# Patient Record
Sex: FEMALE | Race: WHITE | Employment: OTHER | URBAN - METROPOLITAN AREA
[De-identification: names, ages, dates, MRNs, and addresses within clinical notes are randomized per-mention and may not be internally consistent; named-entity substitution may affect disease eponyms.]

---

## 2017-03-07 RX ORDER — PANTOPRAZOLE SODIUM 40 MG/1
TABLET, DELAYED RELEASE ORAL
Qty: 90 TABLET | Refills: 1 | Status: SHIPPED | OUTPATIENT
Start: 2017-03-07 | End: 2017-07-12 | Stop reason: SDUPTHER

## 2017-03-07 RX ORDER — ATORVASTATIN CALCIUM 40 MG/1
TABLET, FILM COATED ORAL
Qty: 90 TABLET | Refills: 1 | Status: SHIPPED | OUTPATIENT
Start: 2017-03-07 | End: 2017-07-12 | Stop reason: SDUPTHER

## 2017-03-07 RX ORDER — LOSARTAN POTASSIUM AND HYDROCHLOROTHIAZIDE 12.5; 5 MG/1; MG/1
TABLET ORAL
Qty: 90 TABLET | Refills: 1 | Status: SHIPPED | OUTPATIENT
Start: 2017-03-07 | End: 2017-04-12 | Stop reason: CLARIF

## 2017-03-13 ENCOUNTER — TELEPHONE (OUTPATIENT)
Dept: FAMILY MEDICINE CLINIC | Age: 72
End: 2017-03-13

## 2017-03-13 LAB — VITAMIN D 25-HYDROXY: 27 NG/ML

## 2017-03-15 ENCOUNTER — OFFICE VISIT (OUTPATIENT)
Dept: FAMILY MEDICINE CLINIC | Age: 72
End: 2017-03-15

## 2017-03-15 VITALS
WEIGHT: 137.25 LBS | BODY MASS INDEX: 25.91 KG/M2 | HEART RATE: 72 BPM | DIASTOLIC BLOOD PRESSURE: 62 MMHG | RESPIRATION RATE: 14 BRPM | SYSTOLIC BLOOD PRESSURE: 98 MMHG | HEIGHT: 61 IN

## 2017-03-15 DIAGNOSIS — I95.2 HYPOTENSION DUE TO DRUGS: ICD-10-CM

## 2017-03-15 DIAGNOSIS — I10 ESSENTIAL HYPERTENSION: ICD-10-CM

## 2017-03-15 DIAGNOSIS — D64.9 ANEMIA, UNSPECIFIED TYPE: ICD-10-CM

## 2017-03-15 DIAGNOSIS — E78.5 HYPERLIPIDEMIA, UNSPECIFIED HYPERLIPIDEMIA TYPE: ICD-10-CM

## 2017-03-15 DIAGNOSIS — M81.0 OSTEOPOROSIS: ICD-10-CM

## 2017-03-15 PROCEDURE — 99214 OFFICE O/P EST MOD 30 MIN: CPT | Performed by: FAMILY MEDICINE

## 2017-03-15 RX ORDER — LOSARTAN POTASSIUM 25 MG/1
25 TABLET ORAL DAILY
Qty: 30 TABLET | Refills: 3 | Status: SHIPPED | OUTPATIENT
Start: 2017-03-15 | End: 2017-07-12 | Stop reason: SDUPTHER

## 2017-03-15 ASSESSMENT — ENCOUNTER SYMPTOMS
CONSTIPATION: 0
SHORTNESS OF BREATH: 0
COUGH: 0
VOMITING: 0
EYES NEGATIVE: 1
ABDOMINAL PAIN: 0
DIARRHEA: 0
NAUSEA: 0
CHEST TIGHTNESS: 0

## 2017-03-31 ENCOUNTER — NURSE ONLY (OUTPATIENT)
Dept: FAMILY MEDICINE CLINIC | Age: 72
End: 2017-03-31

## 2017-03-31 VITALS — DIASTOLIC BLOOD PRESSURE: 74 MMHG | SYSTOLIC BLOOD PRESSURE: 106 MMHG

## 2017-03-31 DIAGNOSIS — Z01.30 BP CHECK: Primary | ICD-10-CM

## 2017-04-05 ENCOUNTER — TELEPHONE (OUTPATIENT)
Dept: FAMILY MEDICINE CLINIC | Age: 72
End: 2017-04-05

## 2017-04-07 LAB
ABSOLUTE BASO #: 0 K/UL (ref 0–0.1)
ABSOLUTE EOS #: 0.1 K/UL (ref 0.1–0.4)
ABSOLUTE LYMPH #: 1.8 K/UL (ref 0.8–5.2)
ABSOLUTE MONO #: 0.3 K/UL (ref 0.1–0.9)
ABSOLUTE NEUT #: 1.9 K/UL (ref 1.3–9.1)
ALBUMIN SERPL-MCNC: 4.4 G/DL (ref 3.2–5.3)
ALK PHOSPHATASE: 72 IU/L (ref 35–121)
ALT SERPL-CCNC: 12 IU/L (ref 5–59)
ANION GAP SERPL CALCULATED.3IONS-SCNC: 11 MMOL/L
AST SERPL-CCNC: 14 IU/L (ref 10–42)
BASOPHILS RELATIVE PERCENT: 1 %
BILIRUB SERPL-MCNC: 0.6 MG/DL (ref 0.2–1.3)
BUN BLDV-MCNC: 14 MG/DL (ref 9–24)
CALCIUM SERPL-MCNC: 9.9 MG/DL (ref 8.7–10.8)
CHLORIDE BLD-SCNC: 105 MMOL/L (ref 95–111)
CO2: 27 MMOL/L (ref 21–32)
CREAT SERPL-MCNC: 1 MG/DL (ref 0.5–1.3)
EGFR AFRICAN AMERICAN: 66
EGFR IF NONAFRICAN AMERICAN: 54
EOSINOPHILS RELATIVE PERCENT: 2.9 %
GLUCOSE: 104 MG/DL (ref 70–100)
HCT VFR BLD CALC: 34.6 % (ref 36–48)
HEMOGLOBIN: 11.3 G/DL (ref 12–16)
LYMPHOCYTE %: 42.8 %
MCH RBC QN AUTO: 28.5 PG (ref 27–34)
MCHC RBC AUTO-ENTMCNC: 32.7 G/DL (ref 31–36)
MCV RBC AUTO: 87.4 FL (ref 80–100)
MONOCYTES # BLD: 7.5 %
NEUTROPHILS RELATIVE PERCENT: 45.6 %
PDW BLD-RTO: 14.1 % (ref 10.8–14.8)
PLATELETS: 345 K/UL (ref 150–450)
POTASSIUM SERPL-SCNC: 3.8 MMOL/L (ref 3.5–5.4)
RBC: 3.96 M/UL (ref 4–5.5)
SODIUM BLD-SCNC: 139 MMOL/L (ref 134–147)
TOTAL PROTEIN: 6.6 G/DL (ref 5.8–8)
WBC: 4.1 K/UL (ref 3.7–10.8)

## 2017-04-12 ENCOUNTER — OFFICE VISIT (OUTPATIENT)
Dept: FAMILY MEDICINE CLINIC | Age: 72
End: 2017-04-12

## 2017-04-12 ENCOUNTER — TELEPHONE (OUTPATIENT)
Dept: FAMILY MEDICINE CLINIC | Age: 72
End: 2017-04-12

## 2017-04-12 VITALS
BODY MASS INDEX: 25.07 KG/M2 | WEIGHT: 132.8 LBS | SYSTOLIC BLOOD PRESSURE: 124 MMHG | HEART RATE: 62 BPM | DIASTOLIC BLOOD PRESSURE: 68 MMHG | RESPIRATION RATE: 14 BRPM | HEIGHT: 61 IN

## 2017-04-12 DIAGNOSIS — M81.0 OSTEOPOROSIS: Primary | ICD-10-CM

## 2017-04-12 DIAGNOSIS — E55.9 VITAMIN D DEFICIENCY: ICD-10-CM

## 2017-04-12 DIAGNOSIS — D64.9 ANEMIA, UNSPECIFIED TYPE: ICD-10-CM

## 2017-04-12 DIAGNOSIS — I10 ESSENTIAL HYPERTENSION: ICD-10-CM

## 2017-04-12 PROCEDURE — 99213 OFFICE O/P EST LOW 20 MIN: CPT | Performed by: FAMILY MEDICINE

## 2017-04-12 RX ORDER — ALENDRONATE SODIUM 70 MG/1
70 TABLET ORAL
Qty: 4 TABLET | Refills: 3 | Status: SHIPPED | OUTPATIENT
Start: 2017-04-12 | End: 2017-07-12 | Stop reason: SDUPTHER

## 2017-04-12 RX ORDER — GABAPENTIN 300 MG/1
300 CAPSULE ORAL 3 TIMES DAILY
COMMUNITY
End: 2018-07-16 | Stop reason: ALTCHOICE

## 2017-04-12 ASSESSMENT — PATIENT HEALTH QUESTIONNAIRE - PHQ9
2. FEELING DOWN, DEPRESSED OR HOPELESS: 0
1. LITTLE INTEREST OR PLEASURE IN DOING THINGS: 0
SUM OF ALL RESPONSES TO PHQ9 QUESTIONS 1 & 2: 0
SUM OF ALL RESPONSES TO PHQ QUESTIONS 1-9: 0

## 2017-04-12 ASSESSMENT — ENCOUNTER SYMPTOMS
COUGH: 0
CONSTIPATION: 0
CHEST TIGHTNESS: 0
DIARRHEA: 0
ABDOMINAL PAIN: 0
VOMITING: 0
EYES NEGATIVE: 1
NAUSEA: 0
SHORTNESS OF BREATH: 0

## 2017-07-07 LAB
ABSOLUTE BASO #: 0.1 K/UL (ref 0–0.1)
ABSOLUTE EOS #: 0.2 K/UL (ref 0.1–0.4)
ABSOLUTE LYMPH #: 2 K/UL (ref 0.8–5.2)
ABSOLUTE MONO #: 0.4 K/UL (ref 0.1–0.9)
ABSOLUTE NEUT #: 2.8 K/UL (ref 1.3–9.1)
BASOPHILS RELATIVE PERCENT: 0.9 %
EOSINOPHILS RELATIVE PERCENT: 3.4 %
HCT VFR BLD CALC: 35.3 % (ref 36–48)
HEMOGLOBIN: 11.5 G/DL (ref 12–16)
LYMPHOCYTE %: 36.8 %
MCH RBC QN AUTO: 29.2 PG (ref 27–34)
MCHC RBC AUTO-ENTMCNC: 32.6 G/DL (ref 31–36)
MCV RBC AUTO: 89.6 FL (ref 80–100)
MONOCYTES # BLD: 7.3 %
NEUTROPHILS RELATIVE PERCENT: 51.4 %
PDW BLD-RTO: 12.9 % (ref 10.8–14.8)
PLATELETS: 356 K/UL (ref 150–450)
RBC: 3.94 M/UL (ref 4–5.5)
WBC: 5.4 K/UL (ref 3.7–10.8)

## 2017-07-08 LAB — VITAMIN D 25-HYDROXY: 28 NG/ML

## 2017-07-11 ENCOUNTER — TELEPHONE (OUTPATIENT)
Dept: FAMILY MEDICINE CLINIC | Age: 72
End: 2017-07-11

## 2017-07-12 ENCOUNTER — OFFICE VISIT (OUTPATIENT)
Dept: FAMILY MEDICINE CLINIC | Age: 72
End: 2017-07-12

## 2017-07-12 VITALS
WEIGHT: 128.8 LBS | SYSTOLIC BLOOD PRESSURE: 130 MMHG | RESPIRATION RATE: 14 BRPM | HEIGHT: 61 IN | DIASTOLIC BLOOD PRESSURE: 72 MMHG | BODY MASS INDEX: 24.32 KG/M2 | HEART RATE: 64 BPM

## 2017-07-12 DIAGNOSIS — M15.9 PRIMARY OSTEOARTHRITIS INVOLVING MULTIPLE JOINTS: ICD-10-CM

## 2017-07-12 DIAGNOSIS — K21.9 GASTROESOPHAGEAL REFLUX DISEASE, ESOPHAGITIS PRESENCE NOT SPECIFIED: ICD-10-CM

## 2017-07-12 DIAGNOSIS — E78.5 HYPERLIPIDEMIA, UNSPECIFIED HYPERLIPIDEMIA TYPE: ICD-10-CM

## 2017-07-12 DIAGNOSIS — M81.0 OSTEOPOROSIS: ICD-10-CM

## 2017-07-12 DIAGNOSIS — I10 ESSENTIAL HYPERTENSION: Primary | ICD-10-CM

## 2017-07-12 PROCEDURE — 99213 OFFICE O/P EST LOW 20 MIN: CPT | Performed by: FAMILY MEDICINE

## 2017-07-12 RX ORDER — ALENDRONATE SODIUM 70 MG/1
70 TABLET ORAL
Qty: 12 TABLET | Refills: 1 | Status: SHIPPED | OUTPATIENT
Start: 2017-07-12 | End: 2017-12-16 | Stop reason: SDUPTHER

## 2017-07-12 RX ORDER — TRIAMCINOLONE ACETONIDE 1 MG/G
CREAM TOPICAL
Refills: 0 | COMMUNITY
Start: 2017-05-17 | End: 2018-07-16 | Stop reason: ALTCHOICE

## 2017-07-12 RX ORDER — PANTOPRAZOLE SODIUM 40 MG/1
40 TABLET, DELAYED RELEASE ORAL DAILY
Qty: 90 TABLET | Refills: 1 | Status: SHIPPED | OUTPATIENT
Start: 2017-07-12 | End: 2018-03-07 | Stop reason: SDUPTHER

## 2017-07-12 RX ORDER — METRONIDAZOLE 10 MG/G
GEL TOPICAL
Refills: 0 | COMMUNITY
Start: 2017-05-17 | End: 2019-03-20 | Stop reason: ALTCHOICE

## 2017-07-12 RX ORDER — LOSARTAN POTASSIUM 25 MG/1
25 TABLET ORAL DAILY
Qty: 90 TABLET | Refills: 1 | Status: SHIPPED | OUTPATIENT
Start: 2017-07-12 | End: 2017-12-05 | Stop reason: SDUPTHER

## 2017-07-12 RX ORDER — ATORVASTATIN CALCIUM 40 MG/1
40 TABLET, FILM COATED ORAL DAILY
Qty: 90 TABLET | Refills: 1 | Status: SHIPPED | OUTPATIENT
Start: 2017-07-12 | End: 2018-03-07 | Stop reason: SDUPTHER

## 2017-07-12 ASSESSMENT — ENCOUNTER SYMPTOMS
VOMITING: 0
CHEST TIGHTNESS: 0
COUGH: 0
ABDOMINAL PAIN: 0
SHORTNESS OF BREATH: 0
EYES NEGATIVE: 1
NAUSEA: 0
CONSTIPATION: 0
DIARRHEA: 0

## 2017-10-06 ENCOUNTER — NURSE ONLY (OUTPATIENT)
Dept: FAMILY MEDICINE CLINIC | Age: 72
End: 2017-10-06

## 2017-10-06 VITALS — HEART RATE: 74 BPM | DIASTOLIC BLOOD PRESSURE: 82 MMHG | SYSTOLIC BLOOD PRESSURE: 138 MMHG

## 2017-10-30 ENCOUNTER — NURSE ONLY (OUTPATIENT)
Dept: FAMILY MEDICINE CLINIC | Age: 72
End: 2017-10-30

## 2017-10-30 DIAGNOSIS — Z23 IMMUNIZATION DUE: Primary | ICD-10-CM

## 2017-10-30 PROCEDURE — G0008 ADMIN INFLUENZA VIRUS VAC: HCPCS | Performed by: FAMILY MEDICINE

## 2017-11-08 ENCOUNTER — OFFICE VISIT (OUTPATIENT)
Dept: FAMILY MEDICINE CLINIC | Age: 72
End: 2017-11-08

## 2017-11-08 VITALS
RESPIRATION RATE: 12 BRPM | HEIGHT: 61 IN | BODY MASS INDEX: 24.96 KG/M2 | DIASTOLIC BLOOD PRESSURE: 72 MMHG | SYSTOLIC BLOOD PRESSURE: 128 MMHG | HEART RATE: 70 BPM | TEMPERATURE: 98.2 F | WEIGHT: 132.2 LBS

## 2017-11-08 DIAGNOSIS — E78.5 HYPERLIPIDEMIA, UNSPECIFIED HYPERLIPIDEMIA TYPE: ICD-10-CM

## 2017-11-08 DIAGNOSIS — K21.9 GASTROESOPHAGEAL REFLUX DISEASE, ESOPHAGITIS PRESENCE NOT SPECIFIED: ICD-10-CM

## 2017-11-08 DIAGNOSIS — J06.9 VIRAL URI: ICD-10-CM

## 2017-11-08 DIAGNOSIS — I10 ESSENTIAL HYPERTENSION: Primary | ICD-10-CM

## 2017-11-08 DIAGNOSIS — E55.9 VITAMIN D DEFICIENCY: ICD-10-CM

## 2017-11-08 PROCEDURE — 99213 OFFICE O/P EST LOW 20 MIN: CPT | Performed by: FAMILY MEDICINE

## 2017-11-08 RX ORDER — AMOXICILLIN AND CLAVULANATE POTASSIUM 875; 125 MG/1; MG/1
1 TABLET, FILM COATED ORAL 2 TIMES DAILY
Qty: 20 TABLET | Refills: 0 | Status: SHIPPED | OUTPATIENT
Start: 2017-11-08 | End: 2017-11-18

## 2017-11-08 ASSESSMENT — ENCOUNTER SYMPTOMS
CHEST TIGHTNESS: 0
SORE THROAT: 1
RHINORRHEA: 1
COUGH: 1
NAUSEA: 0
CONSTIPATION: 0
DIARRHEA: 0
EYES NEGATIVE: 1
ABDOMINAL PAIN: 0
SHORTNESS OF BREATH: 0
VOMITING: 0

## 2017-12-06 RX ORDER — LOSARTAN POTASSIUM 25 MG/1
TABLET ORAL
Qty: 90 TABLET | Refills: 1 | Status: SHIPPED | OUTPATIENT
Start: 2017-12-06 | End: 2018-06-19 | Stop reason: SDUPTHER

## 2017-12-06 NOTE — TELEPHONE ENCOUNTER
Date of last visit:  11/8/2017  Date of next visit:  Visit date not found    Requested Prescriptions     Pending Prescriptions Disp Refills    losartan (COZAAR) 25 MG tablet [Pharmacy Med Name: LOSARTAN POTASSIUM 25 MG Tablet] 90 tablet 1     Sig: TAKE 1 TABLET EVERY DAY

## 2017-12-18 RX ORDER — ALENDRONATE SODIUM 70 MG/1
70 TABLET ORAL
Qty: 12 TABLET | Refills: 1 | Status: SHIPPED | OUTPATIENT
Start: 2017-12-18 | End: 2018-05-18 | Stop reason: SDUPTHER

## 2017-12-18 NOTE — TELEPHONE ENCOUNTER
Date of last visit:  11/8/2017  Date of next visit:  Visit date not found    Requested Prescriptions     Pending Prescriptions Disp Refills    alendronate (FOSAMAX) 70 MG tablet [Pharmacy Med Name: ALENDRONATE SODIUM 70 MG Tablet] 12 tablet 1     Sig: TAKE 1 TABLET BY MOUTH EVERY 7 DAYS

## 2017-12-21 ENCOUNTER — HOSPITAL ENCOUNTER (OUTPATIENT)
Dept: WOMENS IMAGING | Age: 72
Discharge: HOME | End: 2017-12-21
Payer: MEDICARE

## 2017-12-21 DIAGNOSIS — Z12.31 VISIT FOR SCREENING MAMMOGRAM: ICD-10-CM

## 2017-12-21 PROCEDURE — 77063 BREAST TOMOSYNTHESIS BI: CPT

## 2017-12-22 LAB
ALBUMIN SERPL-MCNC: 4.3 G/DL (ref 3.2–5.3)
ALK PHOSPHATASE: 80 IU/L (ref 35–121)
ALT SERPL-CCNC: 13 IU/L (ref 5–59)
ANION GAP SERPL CALCULATED.3IONS-SCNC: 11 MMOL/L
AST SERPL-CCNC: 17 IU/L (ref 10–42)
BILIRUB SERPL-MCNC: 1 MG/DL (ref 0.2–1.3)
BUN BLDV-MCNC: 19 MG/DL (ref 9–24)
CALCIUM SERPL-MCNC: 9.6 MG/DL (ref 8.7–10.8)
CHLORIDE BLD-SCNC: 108 MMOL/L (ref 95–111)
CHOLESTEROL/HDL RATIO: 2.5
CHOLESTEROL: 179 MG/DL
CO2: 26 MMOL/L (ref 21–32)
CREAT SERPL-MCNC: 1 MG/DL (ref 0.5–1.3)
EGFR AFRICAN AMERICAN: 66
EGFR IF NONAFRICAN AMERICAN: 54
GLUCOSE: 90 MG/DL (ref 70–100)
HDLC SERPL-MCNC: 71 MG/DL (ref 40–60)
LDL CHOLESTEROL CALCULATED: 92 MG/DL
LDL/HDL RATIO: 1.3
POTASSIUM SERPL-SCNC: 5.6 MMOL/L (ref 3.5–5.4)
SODIUM BLD-SCNC: 139 MMOL/L (ref 134–147)
TOTAL PROTEIN: 6.7 G/DL (ref 5.8–8)
TRIGL SERPL-MCNC: 79 MG/DL
VLDLC SERPL CALC-MCNC: 16 MG/DL

## 2017-12-23 LAB — VITAMIN D 25-HYDROXY: 29 NG/ML

## 2018-01-05 ENCOUNTER — TELEPHONE (OUTPATIENT)
Dept: FAMILY MEDICINE CLINIC | Age: 73
End: 2018-01-05

## 2018-01-05 DIAGNOSIS — R89.9 ABNORMAL LABORATORY TEST: Primary | ICD-10-CM

## 2018-01-05 NOTE — TELEPHONE ENCOUNTER
----- Message from Emma Somers MD sent at 1/5/2018  3:48 PM EST -----  Please verify if she is taking Vit D if not should take 2,000 units a day also decrease her potassium intake as it was mildly elevated. Repeat K next week.

## 2018-01-11 LAB — POTASSIUM SERPL-SCNC: 4.3 MMOL/L (ref 3.5–5.4)

## 2018-03-07 RX ORDER — ATORVASTATIN CALCIUM 40 MG/1
TABLET, FILM COATED ORAL
Qty: 90 TABLET | Refills: 1 | Status: SHIPPED | OUTPATIENT
Start: 2018-03-07 | End: 2018-03-08 | Stop reason: SDUPTHER

## 2018-03-07 RX ORDER — PANTOPRAZOLE SODIUM 40 MG/1
TABLET, DELAYED RELEASE ORAL
Qty: 90 TABLET | Refills: 1 | Status: SHIPPED | OUTPATIENT
Start: 2018-03-07 | End: 2018-03-08 | Stop reason: SDUPTHER

## 2018-03-08 RX ORDER — PANTOPRAZOLE SODIUM 40 MG/1
TABLET, DELAYED RELEASE ORAL
Qty: 90 TABLET | Refills: 1 | Status: SHIPPED | OUTPATIENT
Start: 2018-03-08 | End: 2018-07-16 | Stop reason: SDUPTHER

## 2018-03-08 RX ORDER — ATORVASTATIN CALCIUM 40 MG/1
TABLET, FILM COATED ORAL
Qty: 90 TABLET | Refills: 1 | Status: SHIPPED | OUTPATIENT
Start: 2018-03-08 | End: 2018-07-16 | Stop reason: SDUPTHER

## 2018-03-14 ENCOUNTER — OFFICE VISIT (OUTPATIENT)
Dept: FAMILY MEDICINE CLINIC | Age: 73
End: 2018-03-14

## 2018-03-14 VITALS
SYSTOLIC BLOOD PRESSURE: 122 MMHG | HEIGHT: 61 IN | BODY MASS INDEX: 26.62 KG/M2 | DIASTOLIC BLOOD PRESSURE: 74 MMHG | WEIGHT: 141 LBS | RESPIRATION RATE: 12 BRPM | HEART RATE: 62 BPM

## 2018-03-14 DIAGNOSIS — E78.5 HYPERLIPIDEMIA, UNSPECIFIED HYPERLIPIDEMIA TYPE: ICD-10-CM

## 2018-03-14 DIAGNOSIS — E55.9 VITAMIN D DEFICIENCY: ICD-10-CM

## 2018-03-14 DIAGNOSIS — Z23 IMMUNIZATION DUE: ICD-10-CM

## 2018-03-14 DIAGNOSIS — M65.342 TRIGGER RING FINGER OF LEFT HAND: ICD-10-CM

## 2018-03-14 DIAGNOSIS — K21.9 GASTROESOPHAGEAL REFLUX DISEASE, ESOPHAGITIS PRESENCE NOT SPECIFIED: ICD-10-CM

## 2018-03-14 DIAGNOSIS — I10 ESSENTIAL HYPERTENSION: Primary | ICD-10-CM

## 2018-03-14 PROCEDURE — 99214 OFFICE O/P EST MOD 30 MIN: CPT | Performed by: FAMILY MEDICINE

## 2018-03-14 PROCEDURE — G0009 ADMIN PNEUMOCOCCAL VACCINE: HCPCS | Performed by: FAMILY MEDICINE

## 2018-03-14 PROCEDURE — 90732 PPSV23 VACC 2 YRS+ SUBQ/IM: CPT | Performed by: FAMILY MEDICINE

## 2018-03-14 RX ORDER — AMOXICILLIN 500 MG/1
CAPSULE ORAL
Refills: 0 | COMMUNITY
Start: 2018-03-07 | End: 2019-07-22 | Stop reason: ALTCHOICE

## 2018-03-14 ASSESSMENT — ENCOUNTER SYMPTOMS
EYES NEGATIVE: 1
DIARRHEA: 0
COUGH: 0
CHEST TIGHTNESS: 0
NAUSEA: 0
VOMITING: 0
CONSTIPATION: 0
SHORTNESS OF BREATH: 0
ABDOMINAL PAIN: 0

## 2018-03-14 NOTE — PROGRESS NOTES
deficiency  Vitamin D 25 Hydroxy   4. Gastroesophageal reflux disease, esophagitis presence not specified     5. Trigger ring finger of left hand  [facility] [place] [facility] Amos Espinosa MD   6. Immunization due  Pneumococcal polysaccharide vaccine 23-valent greater than or equal to 1yo subcutaneous/IM       Plan:      Requested Prescriptions      No prescriptions requested or ordered in this encounter     Current Outpatient Prescriptions   Medication Sig Dispense Refill    pantoprazole (PROTONIX) 40 MG tablet TAKE 1 TABLET EVERY DAY 90 tablet 1    atorvastatin (LIPITOR) 40 MG tablet TAKE 1 TABLET EVERY DAY 90 tablet 1    alendronate (FOSAMAX) 70 MG tablet TAKE 1 TABLET BY MOUTH EVERY 7 DAYS 12 tablet 1    losartan (COZAAR) 25 MG tablet TAKE 1 TABLET EVERY DAY 90 tablet 1    metroNIDAZOLE (METROGEL) 1 % gel APPLY TO FACE ONCE A DAY  0    triamcinolone (KENALOG) 0.1 % cream apply to affected area twice a day  0    NONFORMULARY Take 1 tablet by mouth 2 times daily Silymarin      NONFORMULARY Take 1 capsule by mouth 2 times daily Relora      gabapentin (NEURONTIN) 300 MG capsule Take 300 mg by mouth 3 times daily      Cholecalciferol (VITAMIN D3) 5000 UNITS TABS Take 5,000 Units by mouth every other day      Calcium-Magnesium-Vitamin D 400-166.7-133.3 MG-MG-UNIT TABS Take 1 capsule by mouth daily      aspirin 81 MG EC tablet Take 81 mg by mouth daily. Last dose 7-1-2014 for surgery 7-9-2014      amoxicillin (AMOXIL) 500 MG capsule take 4 capsules by mouth 1 hour prior to appointment  0     No current facility-administered medications for this visit.       Orders Placed This Encounter   Procedures    Pneumococcal polysaccharide vaccine 23-valent greater than or equal to 1yo subcutaneous/IM    Vitamin D 25 Hydroxy     Standing Status:   Future     Standing Expiration Date:   3/14/2019   [address] [facility] of Amos Espinosa MD     Referral Priority:   Routine     Referral Type:   Consult for Advice and Opinion     Referral Reason:   Specialty Services Required     Referred to Provider:   Debbie Ferrer MD     Requested Specialty:   Orthopedic Surgery     Number of Visits Requested:   1     Continue current medications. Return in about 4 months (around 7/14/2018) for HTN. Discussed use, benefit, and side effects of prescribed medications. All patient questions answered. Pt voiced understanding. Instructed to continue current medications, diet and exercise. Patient agreed with treatment plan.

## 2018-04-07 LAB — VITAMIN D 25-HYDROXY: 30 NG/ML

## 2018-04-11 ENCOUNTER — TELEPHONE (OUTPATIENT)
Dept: FAMILY MEDICINE CLINIC | Age: 73
End: 2018-04-11

## 2018-05-18 RX ORDER — ALENDRONATE SODIUM 70 MG/1
TABLET ORAL
Qty: 12 TABLET | Refills: 1 | Status: SHIPPED | OUTPATIENT
Start: 2018-05-18 | End: 2018-10-24 | Stop reason: SDUPTHER

## 2018-07-16 ENCOUNTER — OFFICE VISIT (OUTPATIENT)
Dept: FAMILY MEDICINE CLINIC | Age: 73
End: 2018-07-16

## 2018-07-16 VITALS
DIASTOLIC BLOOD PRESSURE: 86 MMHG | HEIGHT: 61 IN | WEIGHT: 141.6 LBS | SYSTOLIC BLOOD PRESSURE: 134 MMHG | HEART RATE: 66 BPM | RESPIRATION RATE: 14 BRPM | BODY MASS INDEX: 26.73 KG/M2

## 2018-07-16 DIAGNOSIS — M81.0 AGE-RELATED OSTEOPOROSIS WITHOUT CURRENT PATHOLOGICAL FRACTURE: ICD-10-CM

## 2018-07-16 DIAGNOSIS — I10 ESSENTIAL HYPERTENSION: Primary | ICD-10-CM

## 2018-07-16 DIAGNOSIS — K21.9 GASTROESOPHAGEAL REFLUX DISEASE, ESOPHAGITIS PRESENCE NOT SPECIFIED: ICD-10-CM

## 2018-07-16 DIAGNOSIS — E78.5 HYPERLIPIDEMIA, UNSPECIFIED HYPERLIPIDEMIA TYPE: ICD-10-CM

## 2018-07-16 PROCEDURE — 1101F PT FALLS ASSESS-DOCD LE1/YR: CPT | Performed by: FAMILY MEDICINE

## 2018-07-16 PROCEDURE — 99213 OFFICE O/P EST LOW 20 MIN: CPT | Performed by: FAMILY MEDICINE

## 2018-07-16 RX ORDER — MELOXICAM 7.5 MG/1
7.5 TABLET ORAL
COMMUNITY
Start: 2018-03-29 | End: 2019-03-29

## 2018-07-16 RX ORDER — ATORVASTATIN CALCIUM 40 MG/1
TABLET, FILM COATED ORAL
Qty: 90 TABLET | Refills: 1 | Status: SHIPPED | OUTPATIENT
Start: 2018-07-16 | End: 2019-03-04 | Stop reason: SDUPTHER

## 2018-07-16 RX ORDER — PANTOPRAZOLE SODIUM 40 MG/1
TABLET, DELAYED RELEASE ORAL
Qty: 90 TABLET | Refills: 1 | Status: SHIPPED | OUTPATIENT
Start: 2018-07-16 | End: 2019-03-04 | Stop reason: SDUPTHER

## 2018-07-16 RX ORDER — LOSARTAN POTASSIUM 25 MG/1
25 TABLET ORAL DAILY
Qty: 90 TABLET | Refills: 1 | Status: SHIPPED | OUTPATIENT
Start: 2018-07-16 | End: 2019-03-04 | Stop reason: SDUPTHER

## 2018-07-16 ASSESSMENT — ENCOUNTER SYMPTOMS
CHEST TIGHTNESS: 0
SHORTNESS OF BREATH: 0
COUGH: 0
VOMITING: 0
DIARRHEA: 0
EYES NEGATIVE: 1
NAUSEA: 0
ABDOMINAL PAIN: 0
CONSTIPATION: 0

## 2018-07-16 ASSESSMENT — PATIENT HEALTH QUESTIONNAIRE - PHQ9
SUM OF ALL RESPONSES TO PHQ9 QUESTIONS 1 & 2: 0
2. FEELING DOWN, DEPRESSED OR HOPELESS: 0
1. LITTLE INTEREST OR PLEASURE IN DOING THINGS: 0
SUM OF ALL RESPONSES TO PHQ QUESTIONS 1-9: 0

## 2018-07-16 NOTE — PROGRESS NOTES
Visit Date: 7/16/2018    Caron Aaron is a 74 y.o. female who presents today for:  Chief Complaint   Patient presents with    Hypertension stable    Hyperlipidemia need blood work. Saw Dr Raymon Main in the last few months and everything was ok. HPI:     HPI    has a current medication list which includes the following prescription(s): meloxicam, pantoprazole, losartan, atorvastatin, alendronate, metronidazole, vitamin d3, calcium-magnesium-vitamin d, aspirin, and amoxicillin. Allergies   Allergen Reactions    Biaxin [Clarithromycin] Itching       Social History   Substance Use Topics    Smoking status: Never Smoker    Smokeless tobacco: Never Used    Alcohol use 0.0 oz/week      Comment: 1 to 2 times a month       Past Medical History:   Diagnosis Date    Cancer ([facility] [address]     skin    Colon polyps     Diverticulosis 11/10    Hyperlipidemia     Hypertension     Miscarriage     x4    MR (mitral regurgitation)     Osteoarthritis 06/11    right knee    TR (tricuspid regurgitation)     Tubular adenoma of colon     Valvular disease        Past Surgical History:   Procedure Laterality Date    CARPAL TUNNEL RELEASE      right    COLONOSCOPY  11/10    DILATION AND CURETTAGE OF UTERUS      x4     PRE-MALIGNANT / BENIGN SKIN LESION EXCISION  7/9/2014    Nose    SKIN BIOPSY      SKIN CANCER EXCISION  July 2014    nose - Dr. Fagan Berea GASTROINTESTINAL ENDOSCOPY  12/08       Family History   Problem Relation Age of Onset    High Cholesterol Mother     Stroke Mother     Heart Disease Father      Subjective:      Review of Systems   Constitutional: Negative for activity change, appetite change, diaphoresis, fatigue and fever. HENT: Negative. Eyes: Negative. Respiratory: Negative for cough, chest tightness and shortness of breath. Cardiovascular: Negative for chest pain, palpitations and leg swelling.    Gastrointestinal: Negative for abdominal pain, constipation, diarrhea, nausea and vomiting. Genitourinary: Negative. Musculoskeletal: Positive for arthralgias. Sometimes she gets a pain on her left foot, and it goes away on its own. Skin: Negative. Negative for rash. Neurological: Negative for dizziness, syncope, weakness, light-headedness, numbness and headaches. Psychiatric/Behavioral: Negative. Objective:   /86 (Site: Left Arm, Position: Sitting, Cuff Size: Medium Adult)   Pulse 66   Resp 14   Ht 5' 1\" (1.549 m)   Wt 141 lb 9.6 oz (64.2 kg)   Breastfeeding? No   BMI 26.76 kg/m²   Wt Readings from Last 3 Encounters:   07/16/18 141 lb 9.6 oz (64.2 kg)   03/14/18 141 lb (64 kg)   11/08/17 132 lb 3.2 oz (60 kg)     Physical Exam   Constitutional: She is oriented to person, place, and time. She appears well-developed and well-nourished. No distress. HENT:   Head: Normocephalic and atraumatic. Eyes: Conjunctivae and EOM are normal. Pupils are equal, round, and reactive to light. Right eye exhibits no discharge. Left eye exhibits no discharge. No scleral icterus. Neck: Normal range of motion. Neck supple. No JVD present. No thyromegaly present. Cardiovascular: Normal rate, regular rhythm and normal heart sounds. No murmur heard. Pulmonary/Chest: Breath sounds normal. No respiratory distress. She has no wheezes. She has no rhonchi. She has no rales. Abdominal: Soft. Bowel sounds are normal. She exhibits no distension and no mass. There is no hepatosplenomegaly. There is no tenderness. There is no rebound and no guarding. Musculoskeletal: Normal range of motion. Feet, she has a bunion on her right, otherwise nothing significant on her left. Lymphadenopathy:     She has no cervical adenopathy. Neurological: She is alert and oriented to person, place, and time. Skin: Skin is warm and dry. No rash noted. She is not diaphoretic. Psychiatric: She has a normal mood and affect.  Her behavior is normal.   Nursing note and vitals reviewed. Assessment:       Diagnosis Orders   1. Essential hypertension  Comprehensive Metabolic Panel    Lipid Panel   2. Hyperlipidemia, unspecified hyperlipidemia type  Comprehensive Metabolic Panel    Lipid Panel   3. Gastroesophageal reflux disease, esophagitis presence not specified     4. Age-related osteoporosis without current pathological fracture         Plan:      Requested Prescriptions     Signed Prescriptions Disp Refills    pantoprazole (PROTONIX) 40 MG tablet 90 tablet 1     Sig: TAKE 1 TABLET EVERY DAY    losartan (COZAAR) 25 MG tablet 90 tablet 1     Sig: Take 1 tablet by mouth daily    atorvastatin (LIPITOR) 40 MG tablet 90 tablet 1     Sig: TAKE 1 TABLET EVERY DAY     Current Outpatient Prescriptions   Medication Sig Dispense Refill    meloxicam (MOBIC) 7.5 MG tablet Take 7.5 mg by mouth      pantoprazole (PROTONIX) 40 MG tablet TAKE 1 TABLET EVERY DAY 90 tablet 1    losartan (COZAAR) 25 MG tablet Take 1 tablet by mouth daily 90 tablet 1    atorvastatin (LIPITOR) 40 MG tablet TAKE 1 TABLET EVERY DAY 90 tablet 1    alendronate (FOSAMAX) 70 MG tablet TAKE 1 TABLET EVERY 7 DAYS 12 tablet 1    metroNIDAZOLE (METROGEL) 1 % gel APPLY TO FACE ONCE A DAY  0    Cholecalciferol (VITAMIN D3) 5000 UNITS TABS Take 5,000 Units by mouth every other day      Calcium-Magnesium-Vitamin D 400-166.7-133.3 MG-MG-UNIT TABS Take 1 capsule by mouth daily      aspirin 81 MG EC tablet Take 81 mg by mouth daily. Last dose 7-1-2014 for surgery 7-9-2014      amoxicillin (AMOXIL) 500 MG capsule take 4 capsules by mouth 1 hour prior to appointment  0     No current facility-administered medications for this visit.       Orders Placed This Encounter   Procedures    Comprehensive Metabolic Panel     Standing Status:   Future     Standing Expiration Date:   7/16/2019    Lipid Panel     Standing Status:   Future     Standing Expiration Date:   7/16/2019     Order Specific Question:   Is Patient

## 2018-07-24 LAB
ALBUMIN SERPL-MCNC: 4.6 G/DL (ref 3.5–5.2)
ALK PHOSPHATASE: 99 U/L (ref 30–146)
ALT SERPL-CCNC: 12 U/L (ref 5–40)
ANION GAP SERPL CALCULATED.3IONS-SCNC: 10 MEQ/L (ref 10–19)
AST SERPL-CCNC: 14 U/L (ref 9–40)
BILIRUB SERPL-MCNC: 0.6 MG/DL
BUN BLDV-MCNC: 18 MG/DL (ref 8–23)
CALCIUM SERPL-MCNC: 9.4 MG/DL (ref 8.5–10.5)
CHLORIDE BLD-SCNC: 104 MEQ/L (ref 95–107)
CHOLESTEROL/HDL RATIO: 3.1
CHOLESTEROL: 172 MG/DL
CO2: 27 MEQ/L (ref 19–31)
CREAT SERPL-MCNC: 1 MG/DL (ref 0.6–1.3)
EGFR AFRICAN AMERICAN: 63.8 ML/MIN/1.73 M2
EGFR IF NONAFRICAN AMERICAN: 55.1 ML/MIN/1.73 M2
GLUCOSE: 106 MG/DL (ref 70–99)
HDLC SERPL-MCNC: 56 MG/DL
LDL CHOLESTEROL CALCULATED: 94 MG/DL
LDL/HDL RATIO: 1.7
POTASSIUM SERPL-SCNC: 5.4 MEQ/L (ref 3.5–5.4)
SODIUM BLD-SCNC: 141 MEQ/L (ref 135–146)
TOTAL PROTEIN: 6.9 G/DL (ref 6.1–8.3)
TRIGL SERPL-MCNC: 109 MG/DL
VLDLC SERPL CALC-MCNC: 22 MG/DL

## 2018-09-27 ENCOUNTER — NURSE ONLY (OUTPATIENT)
Dept: FAMILY MEDICINE CLINIC | Age: 73
End: 2018-09-27

## 2018-09-27 DIAGNOSIS — Z23 IMMUNIZATION DUE: Primary | ICD-10-CM

## 2018-09-27 PROCEDURE — G0008 ADMIN INFLUENZA VIRUS VAC: HCPCS | Performed by: EMERGENCY MEDICINE

## 2018-09-27 PROCEDURE — 90688 IIV4 VACCINE SPLT 0.5 ML IM: CPT | Performed by: EMERGENCY MEDICINE

## 2018-10-24 RX ORDER — ALENDRONATE SODIUM 70 MG/1
TABLET ORAL
Qty: 12 TABLET | Refills: 1 | Status: SHIPPED | OUTPATIENT
Start: 2018-10-24 | End: 2019-03-20 | Stop reason: SDUPTHER

## 2018-11-14 ENCOUNTER — OFFICE VISIT (OUTPATIENT)
Dept: FAMILY MEDICINE CLINIC | Age: 73
End: 2018-11-14

## 2018-11-14 VITALS
DIASTOLIC BLOOD PRESSURE: 76 MMHG | HEIGHT: 61 IN | WEIGHT: 143 LBS | SYSTOLIC BLOOD PRESSURE: 118 MMHG | BODY MASS INDEX: 27 KG/M2 | RESPIRATION RATE: 12 BRPM | HEART RATE: 70 BPM

## 2018-11-14 DIAGNOSIS — M15.9 PRIMARY OSTEOARTHRITIS INVOLVING MULTIPLE JOINTS: ICD-10-CM

## 2018-11-14 DIAGNOSIS — I10 ESSENTIAL HYPERTENSION: Primary | ICD-10-CM

## 2018-11-14 DIAGNOSIS — E78.5 HYPERLIPIDEMIA, UNSPECIFIED HYPERLIPIDEMIA TYPE: ICD-10-CM

## 2018-11-14 DIAGNOSIS — R21 SKIN RASH: ICD-10-CM

## 2018-11-14 DIAGNOSIS — M81.0 AGE-RELATED OSTEOPOROSIS WITHOUT CURRENT PATHOLOGICAL FRACTURE: ICD-10-CM

## 2018-11-14 PROCEDURE — 99213 OFFICE O/P EST LOW 20 MIN: CPT | Performed by: FAMILY MEDICINE

## 2018-11-14 PROCEDURE — 1101F PT FALLS ASSESS-DOCD LE1/YR: CPT | Performed by: FAMILY MEDICINE

## 2018-11-14 RX ORDER — CLOBETASOL PROPIONATE 0.5 MG/G
CREAM TOPICAL
Qty: 1 TUBE | Refills: 0 | Status: SHIPPED | OUTPATIENT
Start: 2018-11-14 | End: 2019-03-20 | Stop reason: ALTCHOICE

## 2018-11-14 ASSESSMENT — ENCOUNTER SYMPTOMS
CHEST TIGHTNESS: 0
COUGH: 0
NAUSEA: 0
VOMITING: 0
ABDOMINAL PAIN: 0
SHORTNESS OF BREATH: 0
DIARRHEA: 0
EYES NEGATIVE: 1
CONSTIPATION: 0

## 2018-12-14 ENCOUNTER — TELEPHONE (OUTPATIENT)
Dept: FAMILY MEDICINE CLINIC | Age: 73
End: 2018-12-14

## 2018-12-14 RX ORDER — AMOXICILLIN AND CLAVULANATE POTASSIUM 875; 125 MG/1; MG/1
1 TABLET, FILM COATED ORAL 2 TIMES DAILY
Qty: 20 TABLET | Refills: 0 | Status: SHIPPED | OUTPATIENT
Start: 2018-12-14 | End: 2018-12-24

## 2019-01-07 ENCOUNTER — HOSPITAL ENCOUNTER (OUTPATIENT)
Dept: WOMENS IMAGING | Age: 74
Discharge: HOME | End: 2019-01-07
Payer: MEDICARE

## 2019-01-07 DIAGNOSIS — Z12.31 VISIT FOR SCREENING MAMMOGRAM: ICD-10-CM

## 2019-01-07 PROCEDURE — 77063 BREAST TOMOSYNTHESIS BI: CPT

## 2019-01-08 LAB
ABSOLUTE BASO #: 0.1 K/UL (ref 0–0.1)
ABSOLUTE EOS #: 0.3 K/UL (ref 0.1–0.4)
ABSOLUTE LYMPH #: 2.1 K/UL (ref 0.8–5.2)
ABSOLUTE MONO #: 0.5 K/UL (ref 0.1–0.9)
ABSOLUTE NEUT #: 2.9 K/UL (ref 1.3–9.1)
ALBUMIN SERPL-MCNC: 4.5 G/DL (ref 3.5–5.2)
ALK PHOSPHATASE: 88 U/L (ref 30–146)
ALT SERPL-CCNC: 13 U/L (ref 5–40)
ANION GAP SERPL CALCULATED.3IONS-SCNC: 12 MEQ/L (ref 10–19)
AST SERPL-CCNC: 16 U/L (ref 9–40)
BASOPHILS RELATIVE PERCENT: 0.8 %
BILIRUB SERPL-MCNC: 0.7 MG/DL
BUN BLDV-MCNC: 18 MG/DL (ref 8–23)
CALCIUM SERPL-MCNC: 9.8 MG/DL (ref 8.5–10.5)
CHLORIDE BLD-SCNC: 104 MEQ/L (ref 95–107)
CHOLESTEROL/HDL RATIO: 3.3
CHOLESTEROL: 211 MG/DL
CO2: 26 MEQ/L (ref 19–31)
CREAT SERPL-MCNC: 1.1 MG/DL (ref 0.6–1.3)
EGFR AFRICAN AMERICAN: 56.9 ML/MIN/1.73 M2
EGFR IF NONAFRICAN AMERICAN: 49.1 ML/MIN/1.73 M2
EOSINOPHILS RELATIVE PERCENT: 4.4 %
GLUCOSE: 97 MG/DL (ref 70–99)
HCT VFR BLD CALC: 35.7 % (ref 36–48)
HDLC SERPL-MCNC: 63 MG/DL
HEMOGLOBIN: 12 G/DL (ref 12–16)
LDL CHOLESTEROL CALCULATED: 124 MG/DL
LDL/HDL RATIO: 2
LYMPHOCYTE %: 36.3 %
MCH RBC QN AUTO: 29.1 PG (ref 27–34)
MCHC RBC AUTO-ENTMCNC: 33.6 G/DL (ref 31–36)
MCV RBC AUTO: 86.7 FL (ref 80–100)
MONOCYTES # BLD: 8.5 %
NEUTROPHILS RELATIVE PERCENT: 49.7 %
PDW BLD-RTO: 13.4 % (ref 10.8–14.8)
PLATELETS: 360 K/UL (ref 150–450)
POTASSIUM SERPL-SCNC: 5.8 MEQ/L (ref 3.5–5.4)
RBC: 4.12 M/UL (ref 4–5.5)
SODIUM BLD-SCNC: 142 MEQ/L (ref 135–146)
TOTAL PROTEIN: 7.1 G/DL (ref 6.1–8.3)
TRIGL SERPL-MCNC: 119 MG/DL
VLDLC SERPL CALC-MCNC: 24 MG/DL
WBC: 5.9 K/UL (ref 3.7–10.8)

## 2019-01-22 ENCOUNTER — TELEPHONE (OUTPATIENT)
Dept: FAMILY MEDICINE CLINIC | Age: 74
End: 2019-01-22

## 2019-01-22 DIAGNOSIS — R89.9 ABNORMAL LABORATORY TEST RESULT: Primary | ICD-10-CM

## 2019-02-02 LAB
ANION GAP SERPL CALCULATED.3IONS-SCNC: 10 MEQ/L (ref 10–19)
BUN BLDV-MCNC: 16 MG/DL (ref 8–23)
CALCIUM SERPL-MCNC: 10.1 MG/DL (ref 8.5–10.5)
CHLORIDE BLD-SCNC: 105 MEQ/L (ref 95–107)
CO2: 27 MEQ/L (ref 19–31)
CREAT SERPL-MCNC: 1.1 MG/DL (ref 0.6–1.3)
EGFR AFRICAN AMERICAN: 56.9 ML/MIN/1.73 M2
EGFR IF NONAFRICAN AMERICAN: 49.1 ML/MIN/1.73 M2
GLUCOSE: 107 MG/DL (ref 70–99)
POTASSIUM SERPL-SCNC: 5.8 MEQ/L (ref 3.5–5.4)
SODIUM BLD-SCNC: 142 MEQ/L (ref 135–146)

## 2019-02-07 ENCOUNTER — TELEPHONE (OUTPATIENT)
Dept: FAMILY MEDICINE CLINIC | Age: 74
End: 2019-02-07

## 2019-02-07 DIAGNOSIS — E87.6 HYPOKALEMIA: Primary | ICD-10-CM

## 2019-03-06 RX ORDER — LOSARTAN POTASSIUM 25 MG/1
TABLET ORAL
Qty: 90 TABLET | Refills: 1 | Status: SHIPPED | OUTPATIENT
Start: 2019-03-06 | End: 2019-07-22 | Stop reason: SDUPTHER

## 2019-03-06 RX ORDER — PANTOPRAZOLE SODIUM 40 MG/1
TABLET, DELAYED RELEASE ORAL
Qty: 90 TABLET | Refills: 1 | Status: SHIPPED | OUTPATIENT
Start: 2019-03-06 | End: 2019-07-22 | Stop reason: SDUPTHER

## 2019-03-06 RX ORDER — ATORVASTATIN CALCIUM 40 MG/1
TABLET, FILM COATED ORAL
Qty: 90 TABLET | Refills: 1 | Status: SHIPPED | OUTPATIENT
Start: 2019-03-06 | End: 2019-07-22 | Stop reason: SDUPTHER

## 2019-03-18 LAB — POTASSIUM SERPL-SCNC: 5.4 MEQ/L (ref 3.5–5.4)

## 2019-03-20 ENCOUNTER — OFFICE VISIT (OUTPATIENT)
Dept: FAMILY MEDICINE CLINIC | Age: 74
End: 2019-03-20

## 2019-03-20 VITALS
WEIGHT: 144.6 LBS | DIASTOLIC BLOOD PRESSURE: 74 MMHG | BODY MASS INDEX: 27.3 KG/M2 | HEART RATE: 68 BPM | SYSTOLIC BLOOD PRESSURE: 118 MMHG | HEIGHT: 61 IN | RESPIRATION RATE: 14 BRPM

## 2019-03-20 DIAGNOSIS — I10 ESSENTIAL HYPERTENSION: Primary | ICD-10-CM

## 2019-03-20 DIAGNOSIS — E78.5 HYPERLIPIDEMIA, UNSPECIFIED HYPERLIPIDEMIA TYPE: ICD-10-CM

## 2019-03-20 DIAGNOSIS — K21.9 GASTROESOPHAGEAL REFLUX DISEASE, ESOPHAGITIS PRESENCE NOT SPECIFIED: ICD-10-CM

## 2019-03-20 DIAGNOSIS — M81.0 AGE-RELATED OSTEOPOROSIS WITHOUT CURRENT PATHOLOGICAL FRACTURE: ICD-10-CM

## 2019-03-20 DIAGNOSIS — E55.9 VITAMIN D DEFICIENCY: ICD-10-CM

## 2019-03-20 PROCEDURE — 1101F PT FALLS ASSESS-DOCD LE1/YR: CPT | Performed by: FAMILY MEDICINE

## 2019-03-20 PROCEDURE — 4040F PNEUMOC VAC/ADMIN/RCVD: CPT | Performed by: FAMILY MEDICINE

## 2019-03-20 PROCEDURE — G8427 DOCREV CUR MEDS BY ELIG CLIN: HCPCS | Performed by: FAMILY MEDICINE

## 2019-03-20 PROCEDURE — G8400 PT W/DXA NO RESULTS DOC: HCPCS | Performed by: FAMILY MEDICINE

## 2019-03-20 PROCEDURE — G8419 CALC BMI OUT NRM PARAM NOF/U: HCPCS | Performed by: FAMILY MEDICINE

## 2019-03-20 PROCEDURE — 1123F ACP DISCUSS/DSCN MKR DOCD: CPT | Performed by: FAMILY MEDICINE

## 2019-03-20 PROCEDURE — 99213 OFFICE O/P EST LOW 20 MIN: CPT | Performed by: FAMILY MEDICINE

## 2019-03-20 PROCEDURE — 1090F PRES/ABSN URINE INCON ASSESS: CPT | Performed by: FAMILY MEDICINE

## 2019-03-20 PROCEDURE — 1036F TOBACCO NON-USER: CPT | Performed by: FAMILY MEDICINE

## 2019-03-20 RX ORDER — ALENDRONATE SODIUM 70 MG/1
70 TABLET ORAL
Qty: 12 TABLET | Refills: 1 | Status: SHIPPED | OUTPATIENT
Start: 2019-03-20 | End: 2019-10-21 | Stop reason: SDUPTHER

## 2019-03-20 RX ORDER — MULTIVIT-MIN/IRON/FOLIC ACID/K 18-600-40
CAPSULE ORAL
COMMUNITY

## 2019-03-20 ASSESSMENT — ENCOUNTER SYMPTOMS
ABDOMINAL PAIN: 0
EYES NEGATIVE: 1
SHORTNESS OF BREATH: 0
VOMITING: 0
NAUSEA: 0
COUGH: 0
CONSTIPATION: 0
DIARRHEA: 0
CHEST TIGHTNESS: 0

## 2019-04-17 ENCOUNTER — TELEPHONE (OUTPATIENT)
Dept: FAMILY MEDICINE CLINIC | Age: 74
End: 2019-04-17

## 2019-04-17 NOTE — TELEPHONE ENCOUNTER
Please let Brook Finch know that it is up to her if she want to try the supplement that her daughter want her to take of [address]. I'm aware that some weight loss clinics use HGH (human growth hormone) for weight loss. Besides the use for children with growth problems. I do not have any experience with it.

## 2019-04-17 NOTE — TELEPHONE ENCOUNTER
Daughter Beckie Kennedy called stating she dropped off some information and wanted to know if Dr Asher Jimenez had reviewed it. She would like to get her mother started on it.

## 2019-04-27 LAB — VITAMIN D 25-HYDROXY: 29 NG/ML

## 2019-04-29 ENCOUNTER — TELEPHONE (OUTPATIENT)
Dept: FAMILY MEDICINE CLINIC | Age: 74
End: 2019-04-29

## 2019-04-29 DIAGNOSIS — E55.9 VITAMIN D DEFICIENCY: Primary | ICD-10-CM

## 2019-07-22 ENCOUNTER — OFFICE VISIT (OUTPATIENT)
Dept: FAMILY MEDICINE CLINIC | Age: 74
End: 2019-07-22

## 2019-07-22 VITALS
RESPIRATION RATE: 16 BRPM | WEIGHT: 145 LBS | HEART RATE: 66 BPM | HEIGHT: 61 IN | SYSTOLIC BLOOD PRESSURE: 128 MMHG | DIASTOLIC BLOOD PRESSURE: 82 MMHG | BODY MASS INDEX: 27.38 KG/M2

## 2019-07-22 DIAGNOSIS — M15.9 PRIMARY OSTEOARTHRITIS INVOLVING MULTIPLE JOINTS: ICD-10-CM

## 2019-07-22 DIAGNOSIS — E78.5 HYPERLIPIDEMIA, UNSPECIFIED HYPERLIPIDEMIA TYPE: ICD-10-CM

## 2019-07-22 DIAGNOSIS — I10 ESSENTIAL HYPERTENSION: Primary | ICD-10-CM

## 2019-07-22 DIAGNOSIS — M81.0 AGE-RELATED OSTEOPOROSIS WITHOUT CURRENT PATHOLOGICAL FRACTURE: ICD-10-CM

## 2019-07-22 PROCEDURE — 4040F PNEUMOC VAC/ADMIN/RCVD: CPT | Performed by: FAMILY MEDICINE

## 2019-07-22 PROCEDURE — G8400 PT W/DXA NO RESULTS DOC: HCPCS | Performed by: FAMILY MEDICINE

## 2019-07-22 PROCEDURE — 1123F ACP DISCUSS/DSCN MKR DOCD: CPT | Performed by: FAMILY MEDICINE

## 2019-07-22 PROCEDURE — G8427 DOCREV CUR MEDS BY ELIG CLIN: HCPCS | Performed by: FAMILY MEDICINE

## 2019-07-22 PROCEDURE — 1090F PRES/ABSN URINE INCON ASSESS: CPT | Performed by: FAMILY MEDICINE

## 2019-07-22 PROCEDURE — 99213 OFFICE O/P EST LOW 20 MIN: CPT | Performed by: FAMILY MEDICINE

## 2019-07-22 PROCEDURE — G8419 CALC BMI OUT NRM PARAM NOF/U: HCPCS | Performed by: FAMILY MEDICINE

## 2019-07-22 PROCEDURE — 1036F TOBACCO NON-USER: CPT | Performed by: FAMILY MEDICINE

## 2019-07-22 RX ORDER — MELOXICAM 7.5 MG/1
7.5 TABLET ORAL 2 TIMES DAILY
COMMUNITY
End: 2019-11-20

## 2019-07-22 ASSESSMENT — ENCOUNTER SYMPTOMS
ABDOMINAL PAIN: 0
NAUSEA: 0
CONSTIPATION: 0
EYES NEGATIVE: 1
COUGH: 0
DIARRHEA: 0
VOMITING: 0
SHORTNESS OF BREATH: 0
CHEST TIGHTNESS: 0

## 2019-07-24 RX ORDER — PANTOPRAZOLE SODIUM 40 MG/1
TABLET, DELAYED RELEASE ORAL
Qty: 90 TABLET | Refills: 1 | Status: SHIPPED | OUTPATIENT
Start: 2019-07-24 | End: 2020-03-04

## 2019-07-24 RX ORDER — LOSARTAN POTASSIUM 25 MG/1
TABLET ORAL
Qty: 90 TABLET | Refills: 1 | Status: SHIPPED | OUTPATIENT
Start: 2019-07-24 | End: 2020-03-04

## 2019-07-24 RX ORDER — ATORVASTATIN CALCIUM 40 MG/1
TABLET, FILM COATED ORAL
Qty: 90 TABLET | Refills: 1 | Status: SHIPPED | OUTPATIENT
Start: 2019-07-24 | End: 2020-03-04

## 2019-08-07 LAB
ALBUMIN SERPL-MCNC: 4.1 G/DL (ref 3.2–5.3)
ALK PHOSPHATASE: 76 U/L (ref 39–130)
ALT SERPL-CCNC: 14 U/L (ref 0–31)
ANION GAP SERPL CALCULATED.3IONS-SCNC: 6 MMOL/L (ref 4–12)
AST SERPL-CCNC: 17 U/L (ref 0–41)
BILIRUB SERPL-MCNC: 0.7 MG/DL (ref 0.3–1.2)
BUN BLDV-MCNC: 15 MG/DL (ref 5–27)
CALCIUM SERPL-MCNC: 9.3 MG/DL (ref 8.5–10.5)
CHLORIDE BLD-SCNC: 105 MMOL/L (ref 98–109)
CHOLESTEROL/HDL RATIO: 3.1 (ref 1–5)
CHOLESTEROL: 179 MG/DL (ref 150–200)
CO2: 28 MMOL/L (ref 22–32)
CREAT SERPL-MCNC: 1.07 MG/DL (ref 0.4–1)
EGFR AFRICAN AMERICAN: >60 ML/MIN/1.73SQ.M
EGFR IF NONAFRICAN AMERICAN: 50 ML/MIN/1.73SQ.M
GLUCOSE: 98 MG/DL (ref 65–99)
HDLC SERPL-MCNC: 57 MG/DL
LDL CHOLESTEROL CALCULATED: 98 MG/DL
LDL/HDL RATIO: 1.7
POTASSIUM SERPL-SCNC: 4.5 MMOL/L (ref 3.5–5)
SODIUM BLD-SCNC: 139 MMOL/L (ref 134–146)
TOTAL PROTEIN: 6.5 G/DL (ref 6–8)
TRIGL SERPL-MCNC: 119 MG/DL (ref 27–150)
VLDLC SERPL CALC-MCNC: 24 MG/DL (ref 0–30)

## 2019-08-15 ENCOUNTER — HOSPITAL ENCOUNTER (OUTPATIENT)
Dept: WOMENS IMAGING | Age: 74
Discharge: HOME | End: 2019-08-15
Payer: MEDICARE

## 2019-08-15 DIAGNOSIS — M81.0 AGE-RELATED OSTEOPOROSIS WITHOUT CURRENT PATHOLOGICAL FRACTURE: ICD-10-CM

## 2019-08-15 PROCEDURE — 77080 DXA BONE DENSITY AXIAL: CPT

## 2019-09-18 ENCOUNTER — TELEPHONE (OUTPATIENT)
Dept: FAMILY MEDICINE CLINIC | Age: 74
End: 2019-09-18

## 2019-09-18 DIAGNOSIS — M65.342 TRIGGER RING FINGER OF LEFT HAND: Primary | ICD-10-CM

## 2019-10-07 ENCOUNTER — NURSE ONLY (OUTPATIENT)
Dept: FAMILY MEDICINE CLINIC | Age: 74
End: 2019-10-07

## 2019-10-07 DIAGNOSIS — Z23 NEED FOR INFLUENZA VACCINATION: Primary | ICD-10-CM

## 2019-10-07 PROCEDURE — G0008 ADMIN INFLUENZA VIRUS VAC: HCPCS | Performed by: FAMILY MEDICINE

## 2019-10-07 PROCEDURE — 90688 IIV4 VACCINE SPLT 0.5 ML IM: CPT | Performed by: FAMILY MEDICINE

## 2019-10-23 RX ORDER — ALENDRONATE SODIUM 70 MG/1
TABLET ORAL
Qty: 12 TABLET | Refills: 1 | Status: SHIPPED | OUTPATIENT
Start: 2019-10-23 | End: 2020-03-09

## 2019-11-20 ENCOUNTER — OFFICE VISIT (OUTPATIENT)
Dept: FAMILY MEDICINE CLINIC | Age: 74
End: 2019-11-20

## 2019-11-20 VITALS
BODY MASS INDEX: 26.69 KG/M2 | RESPIRATION RATE: 16 BRPM | WEIGHT: 141.38 LBS | HEART RATE: 84 BPM | DIASTOLIC BLOOD PRESSURE: 62 MMHG | SYSTOLIC BLOOD PRESSURE: 118 MMHG | HEIGHT: 61 IN

## 2019-11-20 DIAGNOSIS — I34.1 MVP (MITRAL VALVE PROLAPSE): ICD-10-CM

## 2019-11-20 DIAGNOSIS — E78.5 HYPERLIPIDEMIA, UNSPECIFIED HYPERLIPIDEMIA TYPE: ICD-10-CM

## 2019-11-20 DIAGNOSIS — I10 ESSENTIAL HYPERTENSION: Primary | ICD-10-CM

## 2019-11-20 DIAGNOSIS — R06.02 SOB (SHORTNESS OF BREATH) ON EXERTION: ICD-10-CM

## 2019-11-20 PROCEDURE — 1123F ACP DISCUSS/DSCN MKR DOCD: CPT | Performed by: FAMILY MEDICINE

## 2019-11-20 PROCEDURE — G8399 PT W/DXA RESULTS DOCUMENT: HCPCS | Performed by: FAMILY MEDICINE

## 2019-11-20 PROCEDURE — 4040F PNEUMOC VAC/ADMIN/RCVD: CPT | Performed by: FAMILY MEDICINE

## 2019-11-20 PROCEDURE — 1090F PRES/ABSN URINE INCON ASSESS: CPT | Performed by: FAMILY MEDICINE

## 2019-11-20 PROCEDURE — G8417 CALC BMI ABV UP PARAM F/U: HCPCS | Performed by: FAMILY MEDICINE

## 2019-11-20 PROCEDURE — 1036F TOBACCO NON-USER: CPT | Performed by: FAMILY MEDICINE

## 2019-11-20 PROCEDURE — G8427 DOCREV CUR MEDS BY ELIG CLIN: HCPCS | Performed by: FAMILY MEDICINE

## 2019-11-20 PROCEDURE — 99213 OFFICE O/P EST LOW 20 MIN: CPT | Performed by: FAMILY MEDICINE

## 2019-11-20 ASSESSMENT — ENCOUNTER SYMPTOMS
NAUSEA: 0
EYES NEGATIVE: 1
ABDOMINAL PAIN: 0
COUGH: 0
CONSTIPATION: 0
DIARRHEA: 0
SHORTNESS OF BREATH: 1
VOMITING: 0
CHEST TIGHTNESS: 0

## 2019-11-25 ENCOUNTER — HOSPITAL ENCOUNTER (OUTPATIENT)
Age: 74
Discharge: HOME | End: 2019-11-25
Payer: MEDICARE

## 2019-11-25 ENCOUNTER — HOSPITAL ENCOUNTER (OUTPATIENT)
Dept: GENERAL RADIOLOGY | Age: 74
Discharge: HOME | End: 2019-11-25
Payer: MEDICARE

## 2019-11-25 DIAGNOSIS — R06.02 SOB (SHORTNESS OF BREATH) ON EXERTION: ICD-10-CM

## 2019-11-25 PROCEDURE — 71046 X-RAY EXAM CHEST 2 VIEWS: CPT

## 2019-12-03 ENCOUNTER — TELEPHONE (OUTPATIENT)
Dept: FAMILY MEDICINE CLINIC | Age: 74
End: 2019-12-03

## 2020-03-09 RX ORDER — ALENDRONATE SODIUM 70 MG/1
TABLET ORAL
Qty: 12 TABLET | Refills: 1 | Status: SHIPPED | OUTPATIENT
Start: 2020-03-09 | End: 2020-03-23 | Stop reason: SDUPTHER

## 2020-03-09 NOTE — TELEPHONE ENCOUNTER
Lauren Montiel called requesting a refill of the below medication which has been pended for you:     Requested Prescriptions     Pending Prescriptions Disp Refills    alendronate (FOSAMAX) 70 MG tablet [Pharmacy Med Name: ALENDRONATE SODIUM 70 MG Tablet] 12 tablet 1     Sig: TAKE 1 TABLET EVERY 7 DAYS       Last Appointment Date: 11/20/2019  Next Appointment Date: 3/23/2020    Allergies   Allergen Reactions    Biaxin [Clarithromycin] Itching     Per verbal order Dr Luis Miguel Rodas sent over Rx to pharmacy.

## 2020-03-10 ENCOUNTER — HOSPITAL ENCOUNTER (OUTPATIENT)
Dept: WOMENS IMAGING | Age: 75
Discharge: HOME | End: 2020-03-10
Payer: MEDICARE

## 2020-03-10 PROCEDURE — 77063 BREAST TOMOSYNTHESIS BI: CPT

## 2020-03-23 ENCOUNTER — OFFICE VISIT (OUTPATIENT)
Dept: FAMILY MEDICINE CLINIC | Age: 75
End: 2020-03-23

## 2020-03-23 VITALS
HEART RATE: 80 BPM | BODY MASS INDEX: 27.21 KG/M2 | RESPIRATION RATE: 16 BRPM | WEIGHT: 144.13 LBS | SYSTOLIC BLOOD PRESSURE: 134 MMHG | HEIGHT: 61 IN | DIASTOLIC BLOOD PRESSURE: 78 MMHG

## 2020-03-23 PROCEDURE — G8427 DOCREV CUR MEDS BY ELIG CLIN: HCPCS | Performed by: FAMILY MEDICINE

## 2020-03-23 PROCEDURE — 4040F PNEUMOC VAC/ADMIN/RCVD: CPT | Performed by: FAMILY MEDICINE

## 2020-03-23 PROCEDURE — 1090F PRES/ABSN URINE INCON ASSESS: CPT | Performed by: FAMILY MEDICINE

## 2020-03-23 PROCEDURE — G0438 PPPS, INITIAL VISIT: HCPCS | Performed by: FAMILY MEDICINE

## 2020-03-23 PROCEDURE — 99214 OFFICE O/P EST MOD 30 MIN: CPT | Performed by: FAMILY MEDICINE

## 2020-03-23 PROCEDURE — G8417 CALC BMI ABV UP PARAM F/U: HCPCS | Performed by: FAMILY MEDICINE

## 2020-03-23 PROCEDURE — 1123F ACP DISCUSS/DSCN MKR DOCD: CPT | Performed by: FAMILY MEDICINE

## 2020-03-23 PROCEDURE — 1036F TOBACCO NON-USER: CPT | Performed by: FAMILY MEDICINE

## 2020-03-23 RX ORDER — ALENDRONATE SODIUM 70 MG/1
TABLET ORAL
Qty: 12 TABLET | Refills: 1 | Status: SHIPPED | OUTPATIENT
Start: 2020-03-23 | End: 2020-12-18

## 2020-03-23 RX ORDER — PANTOPRAZOLE SODIUM 40 MG/1
TABLET, DELAYED RELEASE ORAL
Qty: 90 TABLET | Refills: 1 | Status: SHIPPED | OUTPATIENT
Start: 2020-03-23 | End: 2020-11-06

## 2020-03-23 RX ORDER — ATORVASTATIN CALCIUM 40 MG/1
TABLET, FILM COATED ORAL
Qty: 90 TABLET | Refills: 1 | Status: SHIPPED | OUTPATIENT
Start: 2020-03-23 | End: 2020-11-06

## 2020-03-23 RX ORDER — LOSARTAN POTASSIUM 25 MG/1
TABLET ORAL
Qty: 90 TABLET | Refills: 1 | Status: SHIPPED | OUTPATIENT
Start: 2020-03-23 | End: 2020-11-06

## 2020-03-23 ASSESSMENT — ENCOUNTER SYMPTOMS
ABDOMINAL PAIN: 0
EYES NEGATIVE: 1
NAUSEA: 0
SHORTNESS OF BREATH: 0
DIARRHEA: 0
VOMITING: 0
CONSTIPATION: 0
CHEST TIGHTNESS: 0
COUGH: 0

## 2020-03-23 ASSESSMENT — LIFESTYLE VARIABLES
HAS A RELATIVE, FRIEND, DOCTOR, OR ANOTHER HEALTH PROFESSIONAL EXPRESSED CONCERN ABOUT YOUR DRINKING OR SUGGESTED YOU CUT DOWN: 0
HOW OFTEN DO YOU HAVE A DRINK CONTAINING ALCOHOL: 2
AUDIT TOTAL SCORE: 2
HOW OFTEN DURING THE LAST YEAR HAVE YOU HAD A FEELING OF GUILT OR REMORSE AFTER DRINKING: 0
HOW OFTEN DURING THE LAST YEAR HAVE YOU BEEN UNABLE TO REMEMBER WHAT HAPPENED THE NIGHT BEFORE BECAUSE YOU HAD BEEN DRINKING: 0
HOW OFTEN DO YOU HAVE SIX OR MORE DRINKS ON ONE OCCASION: 0
HOW MANY STANDARD DRINKS CONTAINING ALCOHOL DO YOU HAVE ON A TYPICAL DAY: 0
HOW OFTEN DURING THE LAST YEAR HAVE YOU FAILED TO DO WHAT WAS NORMALLY EXPECTED FROM YOU BECAUSE OF DRINKING: 0
AUDIT-C TOTAL SCORE: 2
HOW OFTEN DURING THE LAST YEAR HAVE YOU FOUND THAT YOU WERE NOT ABLE TO STOP DRINKING ONCE YOU HAD STARTED: 0
HOW OFTEN DURING THE LAST YEAR HAVE YOU NEEDED AN ALCOHOLIC DRINK FIRST THING IN THE MORNING TO GET YOURSELF GOING AFTER A NIGHT OF HEAVY DRINKING: 0
HAVE YOU OR SOMEONE ELSE BEEN INJURED AS A RESULT OF YOUR DRINKING: 0

## 2020-03-23 ASSESSMENT — PATIENT HEALTH QUESTIONNAIRE - PHQ9
SUM OF ALL RESPONSES TO PHQ QUESTIONS 1-9: 0
SUM OF ALL RESPONSES TO PHQ QUESTIONS 1-9: 0
SUM OF ALL RESPONSES TO PHQ9 QUESTIONS 1 & 2: 0
1. LITTLE INTEREST OR PLEASURE IN DOING THINGS: 0
SUM OF ALL RESPONSES TO PHQ QUESTIONS 1-9: 0
SUM OF ALL RESPONSES TO PHQ QUESTIONS 1-9: 0
2. FEELING DOWN, DEPRESSED OR HOPELESS: 0

## 2020-03-23 NOTE — PATIENT INSTRUCTIONS
Personalized Preventive Plan for Syed Mcarthur - 3/23/2020  Medicare offers a range of preventive health benefits. Some of the tests and screenings are paid in full while other may be subject to a deductible, co-insurance, and/or copay. Some of these benefits include a comprehensive review of your medical history including lifestyle, illnesses that may run in your family, and various assessments and screenings as appropriate. After reviewing your medical record and screening and assessments performed today your provider may have ordered immunizations, labs, imaging, and/or referrals for you. A list of these orders (if applicable) as well as your Preventive Care list are included within your After Visit Summary for your review. Other Preventive Recommendations:    · A preventive eye exam performed by an eye specialist is recommended every 1-2 years to screen for glaucoma; cataracts, macular degeneration, and other eye disorders. · A preventive dental visit is recommended every 6 months. · Try to get at least 150 minutes of exercise per week or 10,000 steps per day on a pedometer . · Order or download the FREE \"Exercise & Physical Activity: Your Everyday Guide\" from [employer]. Call 3-449.680.5998 or search [employer] online. · You need 7282-7286 mg of calcium and 5696-6263 IU of vitamin D per day. It is possible to meet your calcium requirement with diet alone, but a vitamin D supplement is usually necessary to meet this goal.  · When exposed to the sun, use a sunscreen that protects against both UVA and UVB radiation with an SPF of 30 or greater. Reapply every 2 to 3 hours or after sweating, drying off with a towel, or swimming. · Always wear a seat belt when traveling in a car. Always wear a helmet when riding a bicycle or motorcycle.

## 2020-03-23 NOTE — PROGRESS NOTES
Component                Value               Date                       ALT                      14                  08/06/2019                 AST                      17                  08/06/2019                  has a current medication list which includes the following prescription(s): alendronate, atorvastatin, losartan, pantoprazole, vitamin d, calcium-magnesium-vitamin d, and aspirin. Allergies   Allergen Reactions    Biaxin [Clarithromycin] Itching       Social History     Tobacco Use    Smoking status: Never Smoker    Smokeless tobacco: Never Used   Substance Use Topics    Alcohol use: Yes     Alcohol/week: 0.0 standard drinks     Comment: 1 to 2 times a month    Drug use: No       Past Medical History:   Diagnosis Date    Cancer ([address]     skin    Colon polyps     Diverticulosis 11/10    Hyperlipidemia     Hypertension     Miscarriage     x4    MR (mitral regurgitation)     Osteoarthritis 06/11    right knee    TR (tricuspid regurgitation)     Tubular adenoma of colon     Valvular disease        Past Surgical History:   Procedure Laterality Date    CARPAL TUNNEL RELEASE      right    COLONOSCOPY  11/10    DILATION AND CURETTAGE OF UTERUS      x4     PRE-MALIGNANT / BENIGN SKIN LESION EXCISION  7/9/2014    Nose    SKIN BIOPSY      SKIN CANCER EXCISION  July 2014    nose - Dr. Garcia Hearing GASTROINTESTINAL ENDOSCOPY  12/08       Family History   Problem Relation Age of Onset    High Cholesterol Mother     Stroke Mother     Heart Disease Father      Subjective:     Review of Systems   Constitutional: Negative for activity change, appetite change, diaphoresis, fatigue and fever. HENT: Negative. Eyes: Negative. Respiratory: Negative for cough, chest tightness and shortness of breath. Cardiovascular: Negative for chest pain, palpitations and leg swelling. Gastrointestinal: Negative for abdominal pain, constipation, diarrhea, nausea and vomiting. Genitourinary: Negative. Musculoskeletal: Negative. Skin: Negative for rash. She states that she is loosing hair all over, not in patches   Neurological: Negative for dizziness, syncope, weakness, light-headedness, numbness and headaches. Psychiatric/Behavioral: Negative.        :   /78 (Site: Right Upper Arm, Position: Sitting, Cuff Size: Medium Adult)   Pulse 80   Resp 16   Ht 5' 1\" (1.549 m)   Wt 144 lb 2 oz (65.4 kg)   BMI 27.23 kg/m²   Wt Readings from Last 3 Encounters:   03/23/20 144 lb 2 oz (65.4 kg)   11/20/19 141 lb 6 oz (64.1 kg)   07/22/19 145 lb (65.8 kg)     Physical Exam  Vitals signs and nursing note reviewed. Constitutional:       General: She is not in acute distress. Appearance: She is well-developed. She is not diaphoretic. HENT:      Head: Normocephalic and atraumatic. Eyes:      General: No scleral icterus. Right eye: No discharge. Left eye: No discharge. Conjunctiva/sclera: Conjunctivae normal.      Pupils: Pupils are equal, round, and reactive to light. Neck:      Musculoskeletal: Normal range of motion and neck supple. Thyroid: No thyromegaly. Vascular: No JVD. Cardiovascular:      Rate and Rhythm: Normal rate and regular rhythm. Heart sounds: Normal heart sounds. No murmur. Pulmonary:      Effort: No respiratory distress. Breath sounds: Normal breath sounds. No wheezing, rhonchi or rales. Abdominal:      General: Bowel sounds are normal. There is no distension. Palpations: Abdomen is soft. There is no mass. Tenderness: There is no abdominal tenderness. There is no guarding or rebound. Musculoskeletal: Normal range of motion. Lymphadenopathy:      Cervical: No cervical adenopathy. Skin:     General: Skin is warm and dry. Findings: No rash. Neurological:      Mental Status: She is alert and oriented to person, place, and time.    Psychiatric:         Behavior: Behavior normal.       : Answer:   yes 12 hours    TSH with Reflex     Standing Status:   Future     Standing Expiration Date:   3/23/2021    Vitamin D 25 Hydroxy     Standing Status:   Future     Standing Expiration Date:   3/23/2021       Continue current medications. Return in about 4 months (around 7/23/2020) for HTN. Discussed use, benefit, and side effects of prescribed medications. All patient questions answered. Pt voiced understanding. Instructed to continue current medications,diet and exercise. Patient agreed with treatment plan.

## 2020-03-23 NOTE — PROGRESS NOTES
Medicare Annual Wellness Visit  Name: Zoya Newberry Date: 3/23/2020   MRN: [id number] Sex: Female   Age: 66 y.o. Ethnicity: Non-/Non    : 1945 Race: Leighton Pittman is here for [employer] for behavioral, psychosocial and functional/safety risks, and cognitive dysfunction are all negative except as indicated below. These results, as well as other patient data from the [address] form, are documented in Flowsheets linked to this Encounter. Allergies   Allergen Reactions    Biaxin [Clarithromycin] Itching       Prior to Visit Medications    Medication Sig Taking? Authorizing Provider   alendronate (FOSAMAX) 70 MG tablet TAKE 1 TABLET EVERY 7 DAYS  Juanita Galvin MD   atorvastatin (LIPITOR) 40 MG tablet TAKE 1 TABLET EVERY DAY  Juanita Galvin MD   losartan (COZAAR) 25 MG tablet TAKE 1 TABLET EVERY DAY  Juanita Galvin MD   pantoprazole (PROTONIX) 40 MG tablet TAKE 1 TABLET EVERY DAY  Juanita Galvin MD   Cholecalciferol (VITAMIN D) 2000 units CAPS capsule Take by mouth  Historical Provider, MD   Calcium-Magnesium-Vitamin D 400-166.7-133.3 MG-MG-UNIT TABS Take 1 capsule by mouth daily  Historical Provider, MD   aspirin 81 MG EC tablet Take 81 mg by mouth daily.  Last dose 2014 for surgery 2014  Historical Provider, MD       Past Medical History:   Diagnosis Date    Cancer [facility])     skin    Colon polyps     Diverticulosis 11/10    Hyperlipidemia     Hypertension     Miscarriage     x4    MR (mitral regurgitation)     Osteoarthritis     right knee    TR (tricuspid regurgitation)     Tubular adenoma of colon     Valvular disease        Past Surgical History:   Procedure Laterality Date    CARPAL TUNNEL RELEASE      right    COLONOSCOPY  11/10    DILATION AND CURETTAGE OF UTERUS      x4     PRE-MALIGNANT / BENIGN SKIN LESION EXCISION  2014    Nose    SKIN BIOPSY      SKIN CANCER EXCISION  2014 jasmin - Dr. Anthony Lewis  12/08       Family History   Problem Relation Age of Onset    High Cholesterol Mother     Stroke Mother     Heart Disease Father        CareTeam (Including outside providers/suppliers regularly involved in providing care):   Patient Care Team:  Kate Garrett MD as PCP - General (Cardiology)  James Fuentes MD as PCP - [facility]    Wt Readings from Last 3 Encounters:   03/23/20 144 lb 2 oz (65.4 kg)   11/20/19 141 lb 6 oz (64.1 kg)   07/22/19 145 lb (65.8 kg)     There were no vitals filed for this visit. There is no height or weight on file to calculate BMI. Based upon direct observation of the patient, evaluation of cognition reveals recent and remote memory intact. Patient's complete Health Risk Assessment and screening values have been reviewed and are found in Flowsheets. The following problems were reviewed today and where indicated follow up appointments were made and/or referrals ordered. Positive Risk Factor Screenings with Interventions:     Health Habits/Nutrition:  Health Habits/Nutrition  Do you exercise for at least 20 minutes 2-3 times per week?: (!) No  Have you lost any weight without trying in the past 3 months?: No  Do you eat fewer than 2 meals per day?: No  Have you seen a dentist within the past year?: Yes  There is no height or weight on file to calculate BMI. Health Habits/Nutrition Interventions:  · Inadequate physical activity:  patient states lily she is thinking about starting to walk.     Hearing/Vision:  No exam data present  Hearing/Vision  Do you or your family notice any trouble with your hearing?: (!) Yes  Do you have difficulty driving, watching TV, or doing any of your daily activities because of your eyesight?: No  Have you had an eye exam within the past year?: Yes  Hearing/Vision Interventions:  · Hearing concerns:  patient declines any further evaluation/treatment for Preventive Services Due: see orders and patient instructions/AVS.  . Recommended screening schedule for the next 5-10 years is provided to the patient in written form: see Patient Instructions/AVS.    There are no diagnoses linked to this encounter. Cardiovascular Disease Risk Counseling: Assessed the patient's risk to develop cardiovascular disease and reviewed main risk factors. Reviewed steps to reduce disease risk including:   · Quitting tobacco use, reducing amount smoked, or not starting the habit  · Making healthy food choices  · Being physically active and gradualy increasing activity levels   · Reduce weight and determine a healthy BMI goal  · Monitor blood pressure and treat if higher than 140/90 mmHg  · Maintain blood total cholesterol levels under 5 mmol/l or 190 mg/dl  · Maintain LDL cholesterol levels under 3.0 mmol/l or 115 mg/dl   · Control blood glucose levels  · Continue taking aspirin (75 mg daily)  Provided a follow up plan.

## 2020-05-07 LAB
ABSOLUTE BASO #: 0.1 X10E9/L (ref 0–0.9)
ABSOLUTE EOS #: 0.2 X10E9/L (ref 0–0.4)
ABSOLUTE LYMPH #: 1.9 X10E9/L (ref 1–3.5)
ABSOLUTE MONO #: 0.4 X10E9/L (ref 0–0.9)
ABSOLUTE NEUT #: 2.8 X10E9/L (ref 1.5–6.6)
ALBUMIN SERPL-MCNC: 4.3 G/DL (ref 3.2–5.3)
ALK PHOSPHATASE: 89 U/L (ref 39–130)
ALT SERPL-CCNC: 12 U/L (ref 0–31)
ANION GAP SERPL CALCULATED.3IONS-SCNC: 9 MMOL/L (ref 5–15)
AST SERPL-CCNC: 16 U/L (ref 0–41)
BASOPHILS RELATIVE PERCENT: 1.2 %
BILIRUB SERPL-MCNC: 0.8 MG/DL (ref 0.3–1.2)
BUN BLDV-MCNC: 19 MG/DL (ref 5–27)
CALCIUM SERPL-MCNC: 9.4 MG/DL (ref 8.5–10.5)
CHLORIDE BLD-SCNC: 105 MMOL/L (ref 98–109)
CHOLESTEROL/HDL RATIO: 3.8 (ref 1–5)
CHOLESTEROL: 203 MG/DL (ref 150–200)
CO2: 27 MMOL/L (ref 22–32)
CREAT SERPL-MCNC: 1.1 MG/DL (ref 0.4–1)
EGFR AFRICAN AMERICAN: 58 ML/MIN/1.73SQ.M
EGFR IF NONAFRICAN AMERICAN: 48 ML/MIN/1.73SQ.M
EOSINOPHILS RELATIVE PERCENT: 3 %
GLUCOSE: 105 MG/DL (ref 65–99)
HCT VFR BLD CALC: 36 % (ref 34–48)
HDLC SERPL-MCNC: 53 MG/DL
HEMOGLOBIN: 12.1 G/DL (ref 11.7–16.1)
LDL CHOLESTEROL CALCULATED: 126 MG/DL
LDL/HDL RATIO: 2.4
LYMPHOCYTE %: 35.9 %
MCH RBC QN AUTO: 30.2 PG (ref 27–35)
MCHC RBC AUTO-ENTMCNC: 33.5 G/DL (ref 31–36)
MCV RBC AUTO: 90 FL (ref 81–101)
MONOCYTES # BLD: 7 %
NEUTROPHILS RELATIVE PERCENT: 52.9 %
PDW BLD-RTO: 14.6 % (ref 11.5–14.7)
PLATELETS: 373 X10E9/L (ref 150–450)
PMV BLD AUTO: 8.1 FL (ref 7–12)
POTASSIUM SERPL-SCNC: 4.3 MMOL/L (ref 3.5–5)
RBC: 4 X10E12/L (ref 3.3–5.5)
SODIUM BLD-SCNC: 141 MMOL/L (ref 134–146)
TOTAL PROTEIN: 7.2 G/DL (ref 6–8)
TRIGL SERPL-MCNC: 121 MG/DL (ref 27–150)
TSH SERPL DL<=0.05 MIU/L-ACNC: 2.1 UIU/ML (ref 0.49–4.67)
VITAMIN D 25-HYDROXY: 50.6 NG/ML (ref 30–100)
VLDLC SERPL CALC-MCNC: 24 MG/DL (ref 0–30)
WBC: 5.3 X10E9/L (ref 4–11.8)

## 2020-05-08 ENCOUNTER — TELEPHONE (OUTPATIENT)
Dept: FAMILY MEDICINE CLINIC | Age: 75
End: 2020-05-08

## 2020-05-08 NOTE — TELEPHONE ENCOUNTER
----- Message from Reyes Nguyen MD sent at 5/8/2020  9:40 AM EDT -----  Please verify with patient if any changes in her diet or level of activity as her LDL or bad cholesterol is higher than before.

## 2020-06-08 ENCOUNTER — HOSPITAL ENCOUNTER (OUTPATIENT)
Age: 75
Discharge: HOME | End: 2020-06-08
Payer: MEDICARE

## 2020-06-08 ENCOUNTER — HOSPITAL ENCOUNTER (OUTPATIENT)
Dept: GENERAL RADIOLOGY | Age: 75
Discharge: HOME | End: 2020-06-08
Payer: MEDICARE

## 2020-06-08 PROCEDURE — 71046 X-RAY EXAM CHEST 2 VIEWS: CPT

## 2020-06-09 ENCOUNTER — TELEPHONE (OUTPATIENT)
Dept: FAMILY MEDICINE CLINIC | Age: 75
End: 2020-06-09

## 2020-07-27 ENCOUNTER — OFFICE VISIT (OUTPATIENT)
Dept: FAMILY MEDICINE CLINIC | Age: 75
End: 2020-07-27

## 2020-07-27 VITALS
DIASTOLIC BLOOD PRESSURE: 78 MMHG | HEART RATE: 68 BPM | SYSTOLIC BLOOD PRESSURE: 132 MMHG | RESPIRATION RATE: 16 BRPM | WEIGHT: 140.25 LBS | BODY MASS INDEX: 26.48 KG/M2 | HEIGHT: 61 IN | TEMPERATURE: 96.3 F

## 2020-07-27 PROCEDURE — 1123F ACP DISCUSS/DSCN MKR DOCD: CPT | Performed by: FAMILY MEDICINE

## 2020-07-27 PROCEDURE — 4040F PNEUMOC VAC/ADMIN/RCVD: CPT | Performed by: FAMILY MEDICINE

## 2020-07-27 PROCEDURE — G8399 PT W/DXA RESULTS DOCUMENT: HCPCS | Performed by: FAMILY MEDICINE

## 2020-07-27 PROCEDURE — G8417 CALC BMI ABV UP PARAM F/U: HCPCS | Performed by: FAMILY MEDICINE

## 2020-07-27 PROCEDURE — G8427 DOCREV CUR MEDS BY ELIG CLIN: HCPCS | Performed by: FAMILY MEDICINE

## 2020-07-27 PROCEDURE — 99213 OFFICE O/P EST LOW 20 MIN: CPT | Performed by: FAMILY MEDICINE

## 2020-07-27 PROCEDURE — 1090F PRES/ABSN URINE INCON ASSESS: CPT | Performed by: FAMILY MEDICINE

## 2020-07-27 PROCEDURE — 1036F TOBACCO NON-USER: CPT | Performed by: FAMILY MEDICINE

## 2020-07-27 ASSESSMENT — ENCOUNTER SYMPTOMS
COUGH: 0
ABDOMINAL PAIN: 0
SHORTNESS OF BREATH: 0
CONSTIPATION: 0
EYES NEGATIVE: 1
NAUSEA: 0
CHEST TIGHTNESS: 0
VOMITING: 0
DIARRHEA: 0

## 2020-07-27 NOTE — PROGRESS NOTES
Date: 7/27/2020    David Chamberlain is a 66 y.o. female who presents today for:  Chief Complaint   Patient presents with    Check-Up    Hypertension       HPI:     Treatment Adherence:   Medication compliance:  compliant all of the time  Diet compliance:  compliant most of the time  Weight trend: stable  Current exercise: no regular exercise  Barriers: none    Hypertension:  Home blood pressure monitoring: No. Patient denies chest pain, shortness of breath, headache, lightheadedness, blurred vision and peripheral edema. Antihypertensive medication side effects: no medication side effects noted. Use of agents associated with hypertension: none. Sodium (mmol/L)       Date                     Value                 05/07/2020               141              ---------- BUN (mg/dL)       Date                     Value                 05/07/2020               19               ---------- Glucose (mg/dL)       Date                     Value                 05/07/2020               105 (H)          ----------  Potassium (mmol/L)       Date                     Value                 05/07/2020               4.3              ---------- CREATININE (mg/dL)       Date                     Value                 05/07/2020               1.10 (H)         ----------     Hyperlipidemia:  No new myalgias or GI upset on atorvastatin (Lipitor).      Lab Results       Component                Value               Date                       CHOL                     203 (H)             05/07/2020                 TRIG                     121                 05/07/2020                 HDL                      53                  05/07/2020                 LDLCALC                  126                 05/07/2020                 LDLDIRECT                97                  12/16/2016            Lab Results       Component                Value               Date                       ALT 12                  05/07/2020                 AST                      16                  05/07/2020                  has a current medication list which includes the following prescription(s): alendronate, atorvastatin, losartan, pantoprazole, vitamin d, calcium-magnesium-vitamin d, and aspirin. Allergies   Allergen Reactions    Biaxin [Clarithromycin] Itching       Social History     Tobacco Use    Smoking status: Never Smoker    Smokeless tobacco: Never Used   Substance Use Topics    Alcohol use: Yes     Alcohol/week: 0.0 standard drinks     Comment: 1 to 2 times a month    Drug use: No       Past Medical History:   Diagnosis Date    Cancer ([facility] [address]     skin    Colon polyps     Diverticulosis 11/10    Hyperlipidemia     Hypertension     Miscarriage     x4    MR (mitral regurgitation)     Osteoarthritis 06/11    right knee    TR (tricuspid regurgitation)     Tubular adenoma of colon     Valvular disease        Past Surgical History:   Procedure Laterality Date    CARPAL TUNNEL RELEASE      right    COLONOSCOPY  11/10    DILATION AND CURETTAGE OF UTERUS      x4     PRE-MALIGNANT / BENIGN SKIN LESION EXCISION  7/9/2014    Nose    SKIN BIOPSY      SKIN CANCER EXCISION  July 2014    nose - Dr. Daryl Jackson GASTROINTESTINAL ENDOSCOPY  12/08       Family History   Problem Relation Age of Onset    High Cholesterol Mother     Stroke Mother     Heart Disease Father      Subjective:     Review of Systems   Constitutional: Negative for activity change, appetite change, diaphoresis, fatigue and fever. HENT: Negative. Eyes: Negative. Respiratory: Negative for cough, chest tightness and shortness of breath. Cardiovascular: Negative for chest pain, palpitations and leg swelling. Gastrointestinal: Negative for abdominal pain, constipation, diarrhea, nausea and vomiting. Genitourinary: Negative. Musculoskeletal: Negative. Skin: Negative. Negative for rash. Neurological: Negative for dizziness, syncope, weakness, light-headedness, numbness and headaches. Psychiatric/Behavioral: Negative.        :   /78 (Site: Right Upper Arm, Position: Sitting, Cuff Size: Medium Adult)   Pulse 68   Temp 96.3 °F (35.7 °C) (Skin)   Resp 16   Ht 5' 1\" (1.549 m)   Wt 140 lb 4 oz (63.6 kg)   BMI 26.50 kg/m²   Wt Readings from Last 3 Encounters:   07/27/20 140 lb 4 oz (63.6 kg)   03/23/20 144 lb 2 oz (65.4 kg)   11/20/19 141 lb 6 oz (64.1 kg)     Physical Exam  Vitals signs and nursing note reviewed. Constitutional:       General: She is not in acute distress. Appearance: She is well-developed. She is not diaphoretic. HENT:      Head: Normocephalic and atraumatic. Eyes:      General: No scleral icterus. Right eye: No discharge. Left eye: No discharge. Conjunctiva/sclera: Conjunctivae normal.      Pupils: Pupils are equal, round, and reactive to light. Neck:      Musculoskeletal: Normal range of motion and neck supple. Thyroid: No thyromegaly. Vascular: No JVD. Cardiovascular:      Rate and Rhythm: Normal rate and regular rhythm. Heart sounds: Normal heart sounds. No murmur. Pulmonary:      Effort: No respiratory distress. Breath sounds: Normal breath sounds. No wheezing, rhonchi or rales. Abdominal:      General: Bowel sounds are normal. There is no distension. Palpations: Abdomen is soft. There is no mass. Tenderness: There is no abdominal tenderness. There is no guarding or rebound. Musculoskeletal: Normal range of motion. Lymphadenopathy:      Cervical: No cervical adenopathy. Skin:     General: Skin is warm and dry. Findings: No rash. Neurological:      Mental Status: She is alert and oriented to person, place, and time. Psychiatric:         Behavior: Behavior normal.           :       Diagnosis Orders   1. Essential hypertension     2. Hyperlipidemia, unspecified hyperlipidemia type     3. Nonrheumatic mitral valve regurgitation     4. Nonrheumatic tricuspid valve regurgitation     5. Age-related osteoporosis without current pathological fracture     6. Skin lesion of right leg  Eder Harrington MD, Dermatology, [facility]       :      Requested Prescriptions      No prescriptions requested or ordered in this encounter     Current Outpatient Medications   Medication Sig Dispense Refill    alendronate (FOSAMAX) 70 MG tablet TAKE 1 TABLET EVERY 7 DAYS 12 tablet 1    atorvastatin (LIPITOR) 40 MG tablet TAKE 1 TABLET EVERY DAY 90 tablet 1    losartan (COZAAR) 25 MG tablet TAKE 1 TABLET EVERY DAY 90 tablet 1    pantoprazole (PROTONIX) 40 MG tablet TAKE 1 TABLET EVERY DAY 90 tablet 1    Cholecalciferol (VITAMIN D) 2000 units CAPS capsule Take by mouth      Calcium-Magnesium-Vitamin D 400-166.7-133.3 MG-MG-UNIT TABS Take 1 capsule by mouth daily      aspirin 81 MG EC tablet Take 81 mg by mouth daily. Last dose 7-1-2014 for surgery 7-9-2014       No current facility-administered medications for this visit. Orders Placed This Encounter   Procedures   Verito Stevenson MD, Dermatology, [facility]     Referral Priority:   Routine     Referral Type:   Eval and Treat     Referral Reason:   Specialty Services Required     Referred to Provider:   Edgar Pettit MD     Requested Specialty:   Dermatology     Number of Visits Requested:   1       Continue current medications. Return in about 4 months (around 11/27/2020) for HTN. Discussed use, benefit, and side effects of prescribed medications. All patient questions answered. Pt voiced understanding. Instructed to continue current medications,diet and exercise. Patient agreed with treatment plan.

## 2020-10-20 ENCOUNTER — TELEPHONE (OUTPATIENT)
Dept: FAMILY MEDICINE CLINIC | Age: 75
End: 2020-10-20

## 2020-10-20 NOTE — TELEPHONE ENCOUNTER
Pt called said that last Wednesday was at the hairdresser and she was fine at that time. However Wednesday night the hairdresser started with symptoms. On Friday she texted her and let her know she did test positive for Covid. Pt said she is not having any symptoms at all. Pt is asking what you advise her to do?   Does she need to quarantine etc?    Pt can be reached at [phone]

## 2020-10-21 NOTE — TELEPHONE ENCOUNTER
Please let her know that she does need to quarantine for 14 days from the date of exposure. Wear a mask when around anyone.   Let us know if any symptoms rolling walker

## 2020-10-21 NOTE — TELEPHONE ENCOUNTER
Called pt and informed her to stay quarantine and to wear a mask, she is not showing any symptoms but asked if she should get tested? Please advise.

## 2020-10-21 NOTE — TELEPHONE ENCOUNTER
Please let her know that she does not need to be tested unless she shows symptoms.   Because she needs to quarantine for 14 days regardless

## 2020-11-11 ENCOUNTER — NURSE ONLY (OUTPATIENT)
Dept: FAMILY MEDICINE CLINIC | Age: 75
End: 2020-11-11

## 2020-11-11 PROCEDURE — 90688 IIV4 VACCINE SPLT 0.5 ML IM: CPT | Performed by: FAMILY MEDICINE

## 2020-11-11 PROCEDURE — G0008 ADMIN INFLUENZA VIRUS VAC: HCPCS | Performed by: FAMILY MEDICINE

## 2020-11-11 NOTE — PROGRESS NOTES
Immunizations Administered     Name Date Dose Route    Influenza, Quadv, IM, (6 mo and older Fluzone, Flulaval, Fluarix and 3 yrs and older Afluria) 11/11/2020 0.5 mL Intramuscular    Site: Deltoid- Right    Lot: [id number]    NDC: 23653-310-35

## 2020-12-15 ENCOUNTER — TELEPHONE (OUTPATIENT)
Dept: FAMILY MEDICINE CLINIC | Age: 75
End: 2020-12-15

## 2020-12-15 NOTE — TELEPHONE ENCOUNTER
Please call patient Covid been ordered    Covid test been requested, can go to the Urgent cares or to the drive thru to be tested  Low

## 2020-12-15 NOTE — TELEPHONE ENCOUNTER
Pt called said that she has symptoms of fever around 100 the last couple days. Last Tuesday or Wednesday she had dry cough with a \"crackle\" to it, still has cough. Knees have been achy. Has not had much of an appetite but can still taste and smell. Said she has a pulse oximeter and it has been staying up around 96 or 97. Said she cannot do a virtual appt. Wants to know if she should get tested or if there is something else she should be doing? Said that her daughter has her taking OTC Zinc, Vitamin D and C and Oregano.     She can be reached at [phone]

## 2020-12-16 ENCOUNTER — HOSPITAL ENCOUNTER (OUTPATIENT)
Age: 75
Setting detail: SPECIMEN
Discharge: HOME | End: 2020-12-16
Payer: MEDICARE

## 2020-12-16 PROCEDURE — U0003 INFECTIOUS AGENT DETECTION BY NUCLEIC ACID (DNA OR RNA); SEVERE ACUTE RESPIRATORY SYNDROME CORONAVIRUS 2 (SARS-COV-2) (CORONAVIRUS DISEASE [COVID-19]), AMPLIFIED PROBE TECHNIQUE, MAKING USE OF HIGH THROUGHPUT TECHNOLOGIES AS DESCRIBED BY CMS-2020-01-R: HCPCS

## 2020-12-16 NOTE — TELEPHONE ENCOUNTER
Date of last visit:  7/27/2020  Date of next visit:  1/11/2021    Requested Prescriptions     Pending Prescriptions Disp Refills    alendronate (FOSAMAX) 70 MG tablet [Pharmacy Med Name: ALENDRONATE SODIUM 70 MG Tablet] 12 tablet 1     Sig: TAKE 1 TABLET EVERY 7 DAYS

## 2020-12-18 LAB — SARS-COV-2: DETECTED

## 2020-12-18 RX ORDER — ALENDRONATE SODIUM 70 MG/1
TABLET ORAL
Qty: 12 TABLET | Refills: 1 | Status: SHIPPED | OUTPATIENT
Start: 2020-12-18 | End: 2021-05-19 | Stop reason: SDUPTHER

## 2020-12-21 ENCOUNTER — TELEPHONE (OUTPATIENT)
Dept: FAMILY MEDICINE CLINIC | Age: 75
End: 2020-12-21

## 2020-12-21 NOTE — TELEPHONE ENCOUNTER
----- Message from Rome Cooks, MD sent at 12/21/2020 12:58 PM EST -----  Please let patient know that COVID Test is positive  Please tall patient needs to quarantine, to stay at home and not to get out of the house unless is an emergency. Precautions reviewed for COVID-19 per CDC  Wash hands often with soap and water for at least 20 seconds  When soap is not available us hand  with at least 70% alcohol  Avoid touching your face  Avoid close contact with others  Maintain 6 feet distance if possible  While you are sick:  Cover mouth and nose when coughing or sneezing and then wash hands  Wear a mask when around others  Clean and disinfect surfaces often with soap or detergent and water.     Please let her know that we'll refer her to [facility]

## 2020-12-22 ENCOUNTER — CARE COORDINATION (OUTPATIENT)
Dept: CARE COORDINATION | Age: 75
End: 2020-12-22

## 2020-12-22 NOTE — ACP (ADVANCE CARE PLANNING)
Advance Care Planning   Healthcare Decision Maker:    Primary Decision Maker: Maryam Kelly - [phone]    Click here to complete Healthcare Decision Makers including selection of the Healthcare Decision Maker Relationship (ie \"Primary\")  Today we documented Decision Maker(s) consistent with Legal Next of Kin hierarchy.
07:45

## 2020-12-22 NOTE — CARE COORDINATION
provided contact information for future needs. Reviewed and educated patient on any new and changed medications related to discharge diagnosis     Patient/family/caregiver given information for [facility] Loop and agrees to enroll no  Patient's preferred e-mail:    Patient's preferred phone number:   Based on Loop alert triggers, patient will be contacted by nurse care manager for worsening symptoms. Plan for follow-up call in 5-7 days based on severity of symptoms and risk factors.

## 2020-12-28 ENCOUNTER — CARE COORDINATION (OUTPATIENT)
Dept: CARE COORDINATION | Age: 75
End: 2020-12-28

## 2021-01-04 ENCOUNTER — CARE COORDINATION (OUTPATIENT)
Dept: CARE COORDINATION | Age: 76
End: 2021-01-04

## 2021-01-04 NOTE — CARE COORDINATION
You Patient resolved from the Care Transitions episode on 14-21  Discussed COVID-19 related testing which was available at this time. Test results were positive. Patient informed of results, if available? Yes    Patient/family has been provided the following resources and education related to COVID-19:                         Signs, symptoms and red flags related to COVID-19            CDC exposure and quarantine guidelines            [facility] exposure contact - [phone]            Contact for their local Department of Health                 Patient currently reports that the following symptoms have improved:  no new/worsening symptoms     No further outreach scheduled with this CTN/ACM. Episode of Care resolved. Patient has this CTN/ACM contact information if future needs arise.

## 2021-01-18 ENCOUNTER — OFFICE VISIT (OUTPATIENT)
Dept: FAMILY MEDICINE CLINIC | Age: 76
End: 2021-01-18

## 2021-01-18 VITALS
DIASTOLIC BLOOD PRESSURE: 64 MMHG | BODY MASS INDEX: 26.24 KG/M2 | TEMPERATURE: 97.3 F | SYSTOLIC BLOOD PRESSURE: 124 MMHG | HEIGHT: 61 IN | HEART RATE: 84 BPM | RESPIRATION RATE: 16 BRPM | WEIGHT: 139 LBS

## 2021-01-18 DIAGNOSIS — M25.561 CHRONIC PAIN OF RIGHT KNEE: ICD-10-CM

## 2021-01-18 DIAGNOSIS — G89.29 CHRONIC PAIN OF RIGHT KNEE: ICD-10-CM

## 2021-01-18 DIAGNOSIS — I10 ESSENTIAL HYPERTENSION: Primary | ICD-10-CM

## 2021-01-18 DIAGNOSIS — M81.0 AGE-RELATED OSTEOPOROSIS WITHOUT CURRENT PATHOLOGICAL FRACTURE: ICD-10-CM

## 2021-01-18 DIAGNOSIS — E78.5 HYPERLIPIDEMIA, UNSPECIFIED HYPERLIPIDEMIA TYPE: ICD-10-CM

## 2021-01-18 PROCEDURE — G8399 PT W/DXA RESULTS DOCUMENT: HCPCS | Performed by: FAMILY MEDICINE

## 2021-01-18 PROCEDURE — 1036F TOBACCO NON-USER: CPT | Performed by: FAMILY MEDICINE

## 2021-01-18 PROCEDURE — 1090F PRES/ABSN URINE INCON ASSESS: CPT | Performed by: FAMILY MEDICINE

## 2021-01-18 PROCEDURE — 1123F ACP DISCUSS/DSCN MKR DOCD: CPT | Performed by: FAMILY MEDICINE

## 2021-01-18 PROCEDURE — 99214 OFFICE O/P EST MOD 30 MIN: CPT | Performed by: FAMILY MEDICINE

## 2021-01-18 PROCEDURE — 4040F PNEUMOC VAC/ADMIN/RCVD: CPT | Performed by: FAMILY MEDICINE

## 2021-01-18 PROCEDURE — G8427 DOCREV CUR MEDS BY ELIG CLIN: HCPCS | Performed by: FAMILY MEDICINE

## 2021-01-18 PROCEDURE — G8417 CALC BMI ABV UP PARAM F/U: HCPCS | Performed by: FAMILY MEDICINE

## 2021-01-18 ASSESSMENT — ENCOUNTER SYMPTOMS
ABDOMINAL PAIN: 0
EYES NEGATIVE: 1
CHEST TIGHTNESS: 0
NAUSEA: 0
CONSTIPATION: 0
SHORTNESS OF BREATH: 0
VOMITING: 0
COUGH: 0
DIARRHEA: 0

## 2021-01-18 ASSESSMENT — PATIENT HEALTH QUESTIONNAIRE - PHQ9
1. LITTLE INTEREST OR PLEASURE IN DOING THINGS: 0
SUM OF ALL RESPONSES TO PHQ QUESTIONS 1-9: 0
2. FEELING DOWN, DEPRESSED OR HOPELESS: 0
SUM OF ALL RESPONSES TO PHQ9 QUESTIONS 1 & 2: 0

## 2021-01-18 NOTE — PROGRESS NOTES
Date: 1/18/2021    Marina Vásquez is a 66 y.o. female who presents today for:  Chief Complaint   Patient presents with    Check-Up    Hypertension    Knee Pain     right, started about a year ago. She states that her knee clicks and it feels like it is going to give out, she thinks it gets a little swollen. No injury. HPI:     Hypertension:  Home blood pressure monitoring: No.  She is adherent to a low sodium diet. Patient denies chest pain, shortness of breath, headache, lightheadedness, blurred vision, peripheral edema, palpitations and dry cough. Antihypertensive medication side effects: no medication side effects noted. Use of agents associated with hypertension: none. Sodium (mmol/L)       Date                     Value                 05/07/2020               141              ---------- BUN (mg/dL)       Date                     Value                 05/07/2020               19               ---------- Glucose (mg/dL)       Date                     Value                 05/07/2020               105 (H)          ----------  Potassium (mmol/L)       Date                     Value                 05/07/2020               4.3              ---------- CREATININE (mg/dL)       Date                     Value                 05/07/2020               1.10 (H)         ----------     Hyperlipidemia:  No new myalgias or GI upset on atorvastatin (Lipitor). Medication compliance: compliant all of the time. Patient is  following a low fat, low cholesterol diet. She is not exercising regularly.      Lab Results       Component                Value               Date                       CHOL                     203 (H)             05/07/2020                 TRIG                     121                 05/07/2020                 HDL                      53                  05/07/2020                 LDLCALC                  126                 05/07/2020 LDLDIRECT                97                  12/16/2016            Lab Results       Component                Value               Date                       ALT                      12                  05/07/2020                 AST                      16                  05/07/2020                  has a current medication list which includes the following prescription(s): pantoprazole, losartan, atorvastatin, alendronate, vitamin d, calcium-magnesium-vitamin d, and aspirin. Allergies   Allergen Reactions    Biaxin [Clarithromycin] Itching       Social History     Tobacco Use    Smoking status: Never Smoker    Smokeless tobacco: Never Used   Substance Use Topics    Alcohol use: Yes     Alcohol/week: 0.0 standard drinks     Comment: 1 to 2 times a month    Drug use: No       Past Medical History:   Diagnosis Date    Cancer ([facility] [address]     skin    Colon polyps     Diverticulosis 11/10    Hyperlipidemia     Hypertension     Miscarriage     x4    MR (mitral regurgitation)     Osteoarthritis 06/11    right knee    TR (tricuspid regurgitation)     Tubular adenoma of colon     Valvular disease        Past Surgical History:   Procedure Laterality Date    CARPAL TUNNEL RELEASE      right    COLONOSCOPY  11/10    DILATION AND CURETTAGE OF UTERUS      x4     PRE-MALIGNANT / BENIGN SKIN LESION EXCISION  7/9/2014    Nose    SKIN BIOPSY      SKIN CANCER EXCISION  July 2014    ajsmin - Dr. Herrera Riley GASTROINTESTINAL ENDOSCOPY  12/08       Family History   Problem Relation Age of Onset    High Cholesterol Mother     Stroke Mother     Heart Disease Father      Subjective:     Review of Systems   Constitutional: Negative for activity change, appetite change, diaphoresis, fatigue and fever. HENT: Negative. Eyes: Negative. Respiratory: Negative for cough, chest tightness and shortness of breath. Cardiovascular: Negative for chest pain, palpitations and leg swelling. Gastrointestinal: Negative for abdominal pain, constipation, diarrhea, nausea and vomiting. Genitourinary: Negative. Musculoskeletal: Positive for arthralgias. Skin: Negative. Negative for rash. Neurological: Negative for dizziness, syncope, weakness, light-headedness, numbness and headaches. Psychiatric/Behavioral: Negative.        :   /64 (Site: Right Upper Arm, Position: Sitting, Cuff Size: Medium Adult)   Pulse 84   Temp 97.3 °F (36.3 °C) (Skin)   Resp 16   Ht 5' 1\" (1.549 m)   Wt 139 lb (63 kg)   BMI 26.26 kg/m²   Wt Readings from Last 3 Encounters:   01/18/21 139 lb (63 kg)   07/27/20 140 lb 4 oz (63.6 kg)   03/23/20 144 lb 2 oz (65.4 kg)     Physical Exam  Vitals signs and nursing note reviewed. Constitutional:       General: She is not in acute distress. Appearance: She is well-developed. She is not diaphoretic. HENT:      Head: Normocephalic and atraumatic. Eyes:      General: No scleral icterus. Right eye: No discharge. Left eye: No discharge. Conjunctiva/sclera: Conjunctivae normal.      Pupils: Pupils are equal, round, and reactive to light. Neck:      Musculoskeletal: Normal range of motion and neck supple. Thyroid: No thyromegaly. Vascular: No JVD. Cardiovascular:      Rate and Rhythm: Normal rate and regular rhythm. Heart sounds: Normal heart sounds. No murmur. Pulmonary:      Effort: No respiratory distress. Breath sounds: Normal breath sounds. No wheezing, rhonchi or rales. Abdominal:      General: Bowel sounds are normal. There is no distension. Palpations: Abdomen is soft. There is no mass. Tenderness: There is no abdominal tenderness. There is no guarding or rebound. Musculoskeletal: Normal range of motion. Right knee: She exhibits normal range of motion, no deformity, no LCL laxity, no bony tenderness, normal meniscus and no MCL laxity. Tenderness found.    Lymphadenopathy:      Cervical: No continue current medications,diet and exercise. Patient agreed with treatment plan.

## 2021-01-18 NOTE — LETTER
[facility]  [id number] [place] [address]  Phone: [phone]  Fax: [phone]    Annamarie Graves MD        January 18, 2021     Patient: Bala Hanna   YOB: 1945   Date of Visit: 1/18/2021       To Whom It May Concern: It is my medical opinion that Erica Cueto requires a disability parking placard for the following reasons:  She has limited walking ability due to an arthritic condition. Duration of need: 5 years    If you have any questions or concerns, please don't hesitate to call.     Sincerely,        Annamarie Graves MD

## 2021-01-21 ENCOUNTER — HOSPITAL ENCOUNTER (OUTPATIENT)
Dept: GENERAL RADIOLOGY | Age: 76
Discharge: HOME | End: 2021-01-21
Payer: MEDICARE

## 2021-01-21 ENCOUNTER — HOSPITAL ENCOUNTER (OUTPATIENT)
Age: 76
Discharge: HOME | End: 2021-01-21
Payer: MEDICARE

## 2021-01-21 DIAGNOSIS — G89.29 CHRONIC PAIN OF RIGHT KNEE: ICD-10-CM

## 2021-01-21 DIAGNOSIS — M25.561 CHRONIC PAIN OF RIGHT KNEE: ICD-10-CM

## 2021-01-21 PROCEDURE — 73562 X-RAY EXAM OF KNEE 3: CPT

## 2021-01-22 ENCOUNTER — TELEPHONE (OUTPATIENT)
Dept: FAMILY MEDICINE CLINIC | Age: 76
End: 2021-01-22

## 2021-01-22 DIAGNOSIS — G89.29 CHRONIC PAIN OF RIGHT KNEE: Primary | ICD-10-CM

## 2021-01-22 DIAGNOSIS — M17.11 PRIMARY OSTEOARTHRITIS OF RIGHT KNEE: ICD-10-CM

## 2021-01-22 DIAGNOSIS — M25.561 CHRONIC PAIN OF RIGHT KNEE: Primary | ICD-10-CM

## 2021-01-22 NOTE — TELEPHONE ENCOUNTER
----- Message from Allyn Mcclure MD sent at 1/22/2021  2:56 PM EST -----  Please let her know that her x-ray of her knee showed osteoarthritis  We'll refer her to ortho of her preference

## 2021-05-17 RX ORDER — PANTOPRAZOLE SODIUM 40 MG/1
TABLET, DELAYED RELEASE ORAL
Qty: 90 TABLET | Refills: 0 | Status: SHIPPED | OUTPATIENT
Start: 2021-05-17 | End: 2021-05-19 | Stop reason: SDUPTHER

## 2021-05-17 NOTE — TELEPHONE ENCOUNTER
Date of last visit:  1/18/2021  Date of next visit:  5/19/2021    Requested Prescriptions     Pending Prescriptions Disp Refills    pantoprazole (PROTONIX) 40 MG tablet [Pharmacy Med Name: PANTOPRAZOLE SODIUM 40 MG Tablet Delayed Release] 90 tablet 0     Sig: TAKE 1 TABLET EVERY DAY

## 2021-05-19 ENCOUNTER — OFFICE VISIT (OUTPATIENT)
Dept: FAMILY MEDICINE CLINIC | Age: 76
End: 2021-05-19

## 2021-05-19 VITALS
TEMPERATURE: 97.3 F | RESPIRATION RATE: 16 BRPM | WEIGHT: 139.5 LBS | HEIGHT: 60 IN | SYSTOLIC BLOOD PRESSURE: 134 MMHG | HEART RATE: 88 BPM | DIASTOLIC BLOOD PRESSURE: 68 MMHG | BODY MASS INDEX: 27.39 KG/M2

## 2021-05-19 DIAGNOSIS — M81.0 AGE-RELATED OSTEOPOROSIS WITHOUT CURRENT PATHOLOGICAL FRACTURE: ICD-10-CM

## 2021-05-19 DIAGNOSIS — I10 ESSENTIAL HYPERTENSION: Primary | ICD-10-CM

## 2021-05-19 DIAGNOSIS — E78.5 HYPERLIPIDEMIA, UNSPECIFIED HYPERLIPIDEMIA TYPE: ICD-10-CM

## 2021-05-19 DIAGNOSIS — Z11.59 ENCOUNTER FOR HEPATITIS C SCREENING TEST FOR LOW RISK PATIENT: ICD-10-CM

## 2021-05-19 DIAGNOSIS — K21.9 GASTROESOPHAGEAL REFLUX DISEASE, UNSPECIFIED WHETHER ESOPHAGITIS PRESENT: ICD-10-CM

## 2021-05-19 DIAGNOSIS — E55.9 VITAMIN D DEFICIENCY: ICD-10-CM

## 2021-05-19 PROCEDURE — G8417 CALC BMI ABV UP PARAM F/U: HCPCS | Performed by: FAMILY MEDICINE

## 2021-05-19 PROCEDURE — 1090F PRES/ABSN URINE INCON ASSESS: CPT | Performed by: FAMILY MEDICINE

## 2021-05-19 PROCEDURE — 1123F ACP DISCUSS/DSCN MKR DOCD: CPT | Performed by: FAMILY MEDICINE

## 2021-05-19 PROCEDURE — G8399 PT W/DXA RESULTS DOCUMENT: HCPCS | Performed by: FAMILY MEDICINE

## 2021-05-19 PROCEDURE — 1036F TOBACCO NON-USER: CPT | Performed by: FAMILY MEDICINE

## 2021-05-19 PROCEDURE — G8427 DOCREV CUR MEDS BY ELIG CLIN: HCPCS | Performed by: FAMILY MEDICINE

## 2021-05-19 PROCEDURE — 99213 OFFICE O/P EST LOW 20 MIN: CPT | Performed by: FAMILY MEDICINE

## 2021-05-19 PROCEDURE — 4040F PNEUMOC VAC/ADMIN/RCVD: CPT | Performed by: FAMILY MEDICINE

## 2021-05-19 RX ORDER — ALENDRONATE SODIUM 70 MG/1
TABLET ORAL
Qty: 12 TABLET | Refills: 1 | Status: SHIPPED | OUTPATIENT
Start: 2021-05-19 | End: 2021-11-17

## 2021-05-19 RX ORDER — LOSARTAN POTASSIUM 25 MG/1
TABLET ORAL
Qty: 90 TABLET | Refills: 1 | Status: SHIPPED | OUTPATIENT
Start: 2021-05-19 | End: 2021-09-22 | Stop reason: SDUPTHER

## 2021-05-19 RX ORDER — ATORVASTATIN CALCIUM 40 MG/1
TABLET, FILM COATED ORAL
Qty: 90 TABLET | Refills: 1 | Status: SHIPPED | OUTPATIENT
Start: 2021-05-19 | End: 2021-09-22 | Stop reason: SDUPTHER

## 2021-05-19 RX ORDER — PANTOPRAZOLE SODIUM 40 MG/1
TABLET, DELAYED RELEASE ORAL
Qty: 90 TABLET | Refills: 1 | Status: SHIPPED | OUTPATIENT
Start: 2021-05-19 | End: 2022-02-07

## 2021-05-19 SDOH — ECONOMIC STABILITY: FOOD INSECURITY: WITHIN THE PAST 12 MONTHS, THE FOOD YOU BOUGHT JUST DIDN'T LAST AND YOU DIDN'T HAVE MONEY TO GET MORE.: NEVER TRUE

## 2021-05-19 SDOH — ECONOMIC STABILITY: FOOD INSECURITY: WITHIN THE PAST 12 MONTHS, YOU WORRIED THAT YOUR FOOD WOULD RUN OUT BEFORE YOU GOT MONEY TO BUY MORE.: NEVER TRUE

## 2021-05-19 ASSESSMENT — ENCOUNTER SYMPTOMS
EYES NEGATIVE: 1
SHORTNESS OF BREATH: 0
ABDOMINAL PAIN: 0
CHEST TIGHTNESS: 0
NAUSEA: 0
CONSTIPATION: 0
VOMITING: 0
COUGH: 1
DIARRHEA: 0

## 2021-06-15 ENCOUNTER — HOSPITAL ENCOUNTER (OUTPATIENT)
Dept: WOMENS IMAGING | Age: 76
Discharge: HOME | End: 2021-06-15
Payer: MEDICARE

## 2021-06-15 DIAGNOSIS — Z12.31 VISIT FOR SCREENING MAMMOGRAM: ICD-10-CM

## 2021-06-15 LAB
ABSOLUTE BASO #: 0.1 X10E9/L (ref 0–0.2)
ABSOLUTE EOS #: 0.2 X10E9/L (ref 0–0.4)
ABSOLUTE LYMPH #: 1.9 X10E9/L (ref 1–3.5)
ABSOLUTE MONO #: 0.4 X10E9/L (ref 0–0.9)
ABSOLUTE NEUT #: 2.5 X10E9/L (ref 1.5–6.6)
ALBUMIN SERPL-MCNC: 4.1 G/DL (ref 3.2–5.3)
ALK PHOSPHATASE: 88 U/L (ref 39–130)
ALT SERPL-CCNC: 8 U/L (ref 0–31)
ANION GAP SERPL CALCULATED.3IONS-SCNC: 8 MMOL/L (ref 5–15)
AST SERPL-CCNC: 15 U/L (ref 0–41)
BASOPHILS RELATIVE PERCENT: 1.2 %
BILIRUB SERPL-MCNC: 0.7 MG/DL (ref 0.3–1.2)
BUN BLDV-MCNC: 20 MG/DL (ref 5–27)
CALCIUM SERPL-MCNC: 9 MG/DL (ref 8.5–10.5)
CHLORIDE BLD-SCNC: 106 MMOL/L (ref 98–109)
CHOLESTEROL/HDL RATIO: 3.3 (ref 1–5)
CHOLESTEROL: 178 MG/DL (ref 150–200)
CO2: 27 MMOL/L (ref 22–32)
CREAT SERPL-MCNC: 0.93 MG/DL (ref 0.4–1)
EGFR AFRICAN AMERICAN: >60 ML/MIN/1.73SQ.M
EGFR IF NONAFRICAN AMERICAN: 58 ML/MIN/1.73SQ.M
EOSINOPHILS RELATIVE PERCENT: 3.8 %
GLUCOSE: 101 MG/DL (ref 65–99)
HCT VFR BLD CALC: 35 % (ref 35–47)
HDLC SERPL-MCNC: 54 MG/DL
HEMOGLOBIN: 11.5 G/DL (ref 11.7–15.5)
HEPATITIS C ANTIBODY: NORMAL
LDL CHOLESTEROL CALCULATED: 106 MG/DL
LDL/HDL RATIO: 2
LYMPHOCYTE %: 38 %
MCH RBC QN AUTO: 29.8 PG (ref 27–34)
MCHC RBC AUTO-ENTMCNC: 32.9 G/DL (ref 32–36)
MCV RBC AUTO: 91 FL (ref 80–100)
MONOCYTES # BLD: 7.2 %
NEUTROPHILS RELATIVE PERCENT: 49.8 %
PDW BLD-RTO: 13.6 % (ref 11.5–15)
PLATELETS: 366 X10E9/L (ref 150–450)
PMV BLD AUTO: 8 FL (ref 7–12)
POTASSIUM SERPL-SCNC: 4.3 MMOL/L (ref 3.5–5)
RBC: 3.86 X10E12/L (ref 3.8–5.2)
SODIUM BLD-SCNC: 141 MMOL/L (ref 134–146)
TOTAL PROTEIN: 6.7 G/DL (ref 6–8)
TRIGL SERPL-MCNC: 88 MG/DL (ref 27–150)
VITAMIN D 25-HYDROXY: 58.2 NG/ML (ref 30–100)
VLDLC SERPL CALC-MCNC: 18 MG/DL (ref 0–30)
WBC: 5.1 X10E9/L (ref 4–11)

## 2021-06-15 PROCEDURE — 77063 BREAST TOMOSYNTHESIS BI: CPT

## 2021-09-22 ENCOUNTER — OFFICE VISIT (OUTPATIENT)
Dept: FAMILY MEDICINE CLINIC | Age: 76
End: 2021-09-22

## 2021-09-22 VITALS
RESPIRATION RATE: 16 BRPM | HEART RATE: 72 BPM | SYSTOLIC BLOOD PRESSURE: 126 MMHG | HEIGHT: 61 IN | BODY MASS INDEX: 26.31 KG/M2 | DIASTOLIC BLOOD PRESSURE: 72 MMHG | WEIGHT: 139.38 LBS | TEMPERATURE: 97.2 F

## 2021-09-22 DIAGNOSIS — K21.9 GASTROESOPHAGEAL REFLUX DISEASE, UNSPECIFIED WHETHER ESOPHAGITIS PRESENT: ICD-10-CM

## 2021-09-22 DIAGNOSIS — Z00.00 ROUTINE GENERAL MEDICAL EXAMINATION AT A HEALTH CARE FACILITY: ICD-10-CM

## 2021-09-22 DIAGNOSIS — E78.5 HYPERLIPIDEMIA, UNSPECIFIED HYPERLIPIDEMIA TYPE: ICD-10-CM

## 2021-09-22 DIAGNOSIS — E55.9 VITAMIN D DEFICIENCY: ICD-10-CM

## 2021-09-22 DIAGNOSIS — I10 ESSENTIAL HYPERTENSION: Primary | ICD-10-CM

## 2021-09-22 PROCEDURE — G8427 DOCREV CUR MEDS BY ELIG CLIN: HCPCS | Performed by: FAMILY MEDICINE

## 2021-09-22 PROCEDURE — 4040F PNEUMOC VAC/ADMIN/RCVD: CPT | Performed by: FAMILY MEDICINE

## 2021-09-22 PROCEDURE — G8399 PT W/DXA RESULTS DOCUMENT: HCPCS | Performed by: FAMILY MEDICINE

## 2021-09-22 PROCEDURE — G0439 PPPS, SUBSEQ VISIT: HCPCS | Performed by: FAMILY MEDICINE

## 2021-09-22 PROCEDURE — G8417 CALC BMI ABV UP PARAM F/U: HCPCS | Performed by: FAMILY MEDICINE

## 2021-09-22 PROCEDURE — 1123F ACP DISCUSS/DSCN MKR DOCD: CPT | Performed by: FAMILY MEDICINE

## 2021-09-22 PROCEDURE — 1036F TOBACCO NON-USER: CPT | Performed by: FAMILY MEDICINE

## 2021-09-22 PROCEDURE — 1090F PRES/ABSN URINE INCON ASSESS: CPT | Performed by: FAMILY MEDICINE

## 2021-09-22 PROCEDURE — 99213 OFFICE O/P EST LOW 20 MIN: CPT | Performed by: FAMILY MEDICINE

## 2021-09-22 RX ORDER — LOSARTAN POTASSIUM 25 MG/1
TABLET ORAL
Qty: 90 TABLET | Refills: 1 | Status: SHIPPED | OUTPATIENT
Start: 2021-09-22 | End: 2022-04-04 | Stop reason: SDUPTHER

## 2021-09-22 RX ORDER — ATORVASTATIN CALCIUM 40 MG/1
TABLET, FILM COATED ORAL
Qty: 90 TABLET | Refills: 1 | Status: SHIPPED | OUTPATIENT
Start: 2021-09-22 | End: 2022-04-04 | Stop reason: SDUPTHER

## 2021-09-22 ASSESSMENT — ENCOUNTER SYMPTOMS
NAUSEA: 0
SHORTNESS OF BREATH: 0
VOMITING: 0
EYES NEGATIVE: 1
COUGH: 0
DIARRHEA: 0
CONSTIPATION: 0
ABDOMINAL PAIN: 0
CHEST TIGHTNESS: 0

## 2021-09-22 ASSESSMENT — PATIENT HEALTH QUESTIONNAIRE - PHQ9
SUM OF ALL RESPONSES TO PHQ QUESTIONS 1-9: 0
SUM OF ALL RESPONSES TO PHQ9 QUESTIONS 1 & 2: 0
SUM OF ALL RESPONSES TO PHQ QUESTIONS 1-9: 0
2. FEELING DOWN, DEPRESSED OR HOPELESS: 0
SUM OF ALL RESPONSES TO PHQ QUESTIONS 1-9: 0
1. LITTLE INTEREST OR PLEASURE IN DOING THINGS: 0

## 2021-09-22 ASSESSMENT — LIFESTYLE VARIABLES
AUDIT-C TOTAL SCORE: 2
HOW OFTEN DO YOU HAVE A DRINK CONTAINING ALCOHOL: 2
HAS A RELATIVE, FRIEND, DOCTOR, OR ANOTHER HEALTH PROFESSIONAL EXPRESSED CONCERN ABOUT YOUR DRINKING OR SUGGESTED YOU CUT DOWN: 0
HOW OFTEN DO YOU HAVE SIX OR MORE DRINKS ON ONE OCCASION: 0
HAVE YOU OR SOMEONE ELSE BEEN INJURED AS A RESULT OF YOUR DRINKING: 0
HOW OFTEN DURING THE LAST YEAR HAVE YOU FOUND THAT YOU WERE NOT ABLE TO STOP DRINKING ONCE YOU HAD STARTED: 0
AUDIT TOTAL SCORE: 2
HOW OFTEN DURING THE LAST YEAR HAVE YOU BEEN UNABLE TO REMEMBER WHAT HAPPENED THE NIGHT BEFORE BECAUSE YOU HAD BEEN DRINKING: 0
HOW MANY STANDARD DRINKS CONTAINING ALCOHOL DO YOU HAVE ON A TYPICAL DAY: 0
HOW OFTEN DURING THE LAST YEAR HAVE YOU FAILED TO DO WHAT WAS NORMALLY EXPECTED FROM YOU BECAUSE OF DRINKING: 0
HOW OFTEN DURING THE LAST YEAR HAVE YOU HAD A FEELING OF GUILT OR REMORSE AFTER DRINKING: 0
HOW OFTEN DURING THE LAST YEAR HAVE YOU NEEDED AN ALCOHOLIC DRINK FIRST THING IN THE MORNING TO GET YOURSELF GOING AFTER A NIGHT OF HEAVY DRINKING: 0

## 2021-09-22 NOTE — PATIENT INSTRUCTIONS
Personalized Preventive Plan for Maureen Elliott - 2021  Medicare offers a range of preventive health benefits. Some of the tests and screenings are paid in full while other may be subject to a deductible, co-insurance, and/or copay. Some of these benefits include a comprehensive review of your medical history including lifestyle, illnesses that may run in your family, and various assessments and screenings as appropriate. After reviewing your medical record and screening and assessments performed today your provider may have ordered immunizations, labs, imaging, and/or referrals for you. A list of these orders (if applicable) as well as your Preventive Care list are included within your After Visit Summary for your review. Other Preventive Recommendations:    · A preventive eye exam performed by an eye specialist is recommended every 1-2 years to screen for glaucoma; cataracts, macular degeneration, and other eye disorders. · A preventive dental visit is recommended every 6 months. · Try to get at least 150 minutes of exercise per week or 10,000 steps per day on a pedometer . · Order or download the FREE \"Exercise & Physical Activity: Your Everyday Guide\" from [employer]. Call 8-315.857.5865 or search [employer] [place] [employer] online. · You need 9357-8981 mg of calcium and 3704-4383 IU of vitamin D per day. It is possible to meet your calcium requirement with diet alone, but a vitamin D supplement is usually necessary to meet this goal.  · When exposed to the sun, use a sunscreen that protects against both UVA and UVB radiation with an SPF of 30 or greater. Reapply every 2 to 3 hours or after sweating, drying off with a towel, or swimming. · Always wear a seat belt when traveling in a car. Always wear a helmet when riding a bicycle or motorcycle.     DASH Diet: After Your Visit  Your Care Instructions  The DASH diet is an eating plan that can help lower your blood as possible. · Limit lean meat, poultry, and fish to 6 ounces each day. Six ounces is about the size of two decks of cards. · Eat 4 to 5 servings of nuts, seeds, and legumes (cooked dried beans, lentils, and split peas) each week. A serving is 1/3 cup of nuts, 2 tablespoons of seeds, or ½ cup cooked dried beans or peas. · Limit sweets and added sugars to 5 servings or less a week. A serving is 1 tablespoon jelly or jam, ½ cup sorbet, or 1 cup of lemonade. Tips for success  · Start small. Do not try to make dramatic changes to your diet all at once. You might feel that you are missing out on your favorite foods and then be more likely to not follow the plan. Make small changes, and stick with them. Once those changes become habit, add a few more changes. · Try some of the following:  ¨ Make it a goal to eat a fruit or vegetable at every meal and at snacks. This will make it easy to get the recommended amount of fruits and vegetables each day. ¨ Try yogurt topped with fruit and nuts for a snack or healthy dessert. ¨ Add lettuce, tomato, cucumber, and onion to sandwiches. ¨ Combine a ready-made pizza crust with low-fat mozzarella cheese and lots of vegetable toppings. Try using tomatoes, squash, spinach, broccoli, carrots, cauliflower, and onions. ¨ Have a variety of cut-up vegetables with a low-fat dip as an appetizer instead of chips and dip. ¨ Sprinkle sunflower seeds or chopped almonds over salads. Or try adding chopped walnuts or almonds to cooked vegetables. Try some vegetarian meals using beans and peas. Add garbanzo or kidney beans to salads. Make burritos and tacos with mashed [place] beans or black beans    © 7527-3498 [employer]. Care instructions adapted under license by [facility].  This care instruction is for use with your licensed healthcare professional. If you have questions about a medical condition or this instruction, always ask your healthcare professional. [employer] disclaims any warranty or liability for your use of this information. Content Version: 9.4.21263; Last Revised: March 15, 2012              ·     Keep Your Memory Cameron Poncho       Many factors can affect your ability to remembera hectic lifestyle, aging, stress, chronic disease, and certain medicines. But, there are steps you can take to sharpen your mind and help preserve your memory. Challenge Your Brain   Regularly challenging your mind may help keeps it in top shape. Good mental exercises include:   · Crossword puzzlesUse a dictionary if you need it; you will learn more that way. · Brainteasers Try some! · Crafts, such as wood working and sewing   · Hobbies, such as gardening and building model airplanes   · [address] [facility] old friends or join groups to meet new ones. · Reading   · Learning a new language   · Taking a class, whether it be art history or sunday chi   · TravelingExperience the food, history, and culture of your destination   · Learning to use a computer   · Going to museums, the theater, or thought-provoking movies   · Changing things in your daily life, such as reversing your pattern in the grocery store or brushing your teeth using your nondominant hand   Use Memory Aids   There is no need to remember every detail on your own. These memory aids can help:   · Calendars and day planners   · Electronic organizers to store all sorts of helpful informationThese devices can \"beep\" to remind you of appointments. · A book of days to record birthdays, anniversaries, and other occasions that occur on the same date every year   · Detailed \"to-do\" lists and strategically placed sticky notes   · Quick \"study\" sessionsBefore a gathering, review who will be there so their names will be fresh in your mind.    · Establish routinesFor example, keep your keys, wallet, and umbrella in the same place all the time or take medicine with your 8:00 AM glass of juice   Live a Healthy Life Many actions that will keep your body strong will do the same for your mind. For example:   Talk to Your Doctor About Herbs and Supplements    Malnutrition and vitamin deficiencies can impair your mental function. For example, vitamin B12 deficiency can cause a range of symptoms, including confusion. But, what if your nutritional needs are being met? Can herbs and supplements still offer a benefit? Researchers have investigated a range of natural remedies, such as ginkgo , ginseng , and the supplement phosphatidylserine (PS). So far, though, the evidence is inconsistent as to whether these products can improve memory or thinking. If you are interested in taking herbs and supplements, talk to your doctor first because they may interact with other medicines that you are taking. Exercise Regularly    Among the many benefits of regular exercise are increased blood flow to the brain and decreased risk of certain diseases that can interfere with memory function. One study found that even moderate exercise has a beneficial effect. Examples of \"moderate\" exercise include:   · Playing 18 holes of golf once a week, without a cart   · Playing tennis twice a week   · Walking one mile per day   Manage Stress    It can be tough to remember what is important when your mind is cluttered. Make time for relaxation. Choose activities that calm you down, and make it routine. Manage Chronic Conditions    Side effects of high blood pressure , diabetes, and heart disease can interfere with mental function. Many of the lifestyle steps discussed here can help manage these conditions. Strive to eat a healthy diet, exercise regularly, get stress under control, and follow your doctor's advice for your condition. Minimize Medications    Talk to your doctor about the medicines that you take. Some may be unnecessary. Also, healthy lifestyle habits may lower the need for certain drugs.      Last Reviewed: April 2010 Christin Egan MD Updated: 4/13/2010   ·     Ke[facility]       As we get older, changes in balance, gait, strength, vision, hearing, and cognition make even the most youthful senior more prone to accidents. Falls are one of the leading health risks for older people. This increased risk of falling is related to:   · Aging process (eg, decreased muscle strength, slowed reflexes)   · Higher incidence of chronic health problems (eg, arthritis, diabetes) that may limit mobility, agility or sensory awareness   · Side effects of medicine (eg, dizziness, blurred vision)especially medicines like prescription pain medicines and drugs used to treat mental health conditions   Depending on the brittleness of your bones, the consequences of a fall can be serious and long lasting. Home Life   Research by the Association of Aging [facility]) shows that some home accidents among older adults can be prevented by making simple lifestyle changes and basic modifications and repairs to the home environment. Here are some lifestyle changes that experts recommend:   · Have your hearing and vision checked regularly. Be sure to wear prescription glasses that are right for you. · Speak to your doctor or pharmacist about the possible side effects of your medicines. A number of medicines can cause dizziness. · If you have problems with sleep, talk to your doctor. · Limit your intake of alcohol. · If necessary, use a cane or walker to help maintain your balance. · Wear supportive, rubber-soled shoes, even at home. If you live in a region that gets wintry weather, you may want to put special cleats on your shoes to prevent you from slipping on the snow and ice. · Exercise regularly to help maintain muscle tone, agility, and balance. · Always hold the banister when going up or down stairs. Also, use  bars when getting in or out of the bath or shower, or using the toilet. · To avoid dizziness, get up slowly from a lying down position. Sit up first, dangling your legs for a minute or two before rising to a standing position. Overall Home Safety Check   According to the Consumer [employer] Safety Commision's \"Older Consumer Home Safety Checklist,\" it is important to check for potential hazards in each room. And remember, proper lighting is an essential factor in home safety. If you cannot see clearly, you are more likely to fall. Important questions to ask yourself include:   · Are lamp, electric, extension, and telephone cords placed out of the flow of traffic and maintained in good condition? Have frayed cords been replaced? · Are all small rugs and runners slip resistant? If not, you can secure them to the floor with a special double-sided carpet tape. · Are smoke detectors properly locatedone on every floor of your home and one outside of every sleeping area? Are they in good working order? Are batteries replaced at least once a year? · Do you have a well-maintained carbon monoxide detector outside every sleeping are in your home? · Does your furniture layout leave plenty of space to maneuver between and around chairs, tables, beds, and sofas? · Are hallways, stairs and passages between rooms well lit? Can you reach a lamp without getting out of bed? · Are floor surfaces well maintained? Shag rugs, high-pile carpeting, tile floors, and polished wood floors can be particularly slippery. Stairs should always have handrails and be carpeted or fitted with a non-skid tread. · Is your telephone easily reachable. Is the cord safely tucked away? Room by Room   According to the Association of Aging, bathrooms and holli are the two most potentially hazardous rooms in your home. In the Kitchen    · Be sure your stove is in proper working order and always make sure burners and the oven are off before you go out or go to sleep.     · Keep pots on the back burners, turn handles away from the front of the stove, and keep stove clean and free of grease build-up. · Kitchen ventilation systems and range exhausts should be working properly. · Keep flammable objects such as towels and pot holders away from the cooking area except when in use. Make sure kitchen curtains are tied back. · Move cords and appliances away from the sink and hot surfaces. If extension cords are needed, install wiring guides so they do not hang over the sink, range, or working areas. · Look for coffee pots, kettles and toaster ovens with automatic shut-offs. · Keep a mop handy in the kitchen so you can wipe up spills instantly. You should also have a small fire extinguisher. · Arrange your kitchen with frequently used items on lower shelves to avoid the need to stand on a stepstool to reach them. · Make sure countertops are well-lit to avoid injuries while cutting and preparing food. In the Bathroom    · Use a non-slip mat or decals in the tub and shower, since wet, soapy tile or porcelain surfaces are extremely slippery. · Make sure bathroom rugs are non-skid or tape them firmly to the floor. Bathtubs should have at least one, preferably two, grab bars, firmly attached to structural supports in the wall. (Do not use built-in soap holders or glass shower doors as grab bars.)    · Tub seats fitted with non-slip material on the legs allow you to wash sitting down. For people with limited mobility, bathtub transfer benches allow you to slide safely into the tub. · Raised toilet seats and toilet safety rails are helpful for those with knee or hip problems. In the Bedroom    · Make sure you use a nightlight and that the area around your bed is clear of potential obstacles. · Be careful with electric blankets and never go to sleep with a heating pad, which can cause serious burns even if on a low setting. · Use fire-resistant mattress covers and pillows, and NEVER smoke in bed.     · Keep a phone next to the bed that is programmed to dial 911 at the push of a button. If you have a chronic condition, you may want to sign on with an automatic call-in service. Typically the system includes a small pendant that connects directly to an emergency medical voice-response system. You should also make arrangements to stay in contact with someonefriend, neighbor, family memberon a regular schedule. Fire Prevention   According to the [employer] (Smoke Alarms for Every) [address] [place] [address], senior citizens are one of the two highest risk groups for death and serious injuries due to residential fires. · When cooking, wear short-sleeved items, never a bulky long-sleeved robe. · The [facility]'s Safety Checklist for Older Consumers emphasizes the importance of checking basements, garages, workshops and storage areas for fire hazards, such as volatile liquids, piles of old rags or clothing and overloaded circuits. · Never smoke in bed or when lying down on a couch or recliner chair. · Small portable electric or kerosene heaters are responsible for many home fires and should be used cautiously if at all. If you do use one, be sure to keep them away from flammable materials. · In case of fire, make sure you have a pre-established emergency exit plan. · Have a professional check your fireplace and other fuel-burning appliances yearly. Hel[employer]   Baby boomers entering the hugo years will continue to see the development of new products to help older adults live safely and independently in spite of age-related changes. Making Life More Livable  , by Lenord [employer], lists over 1,000 products for \"living well in the mature years,\" such as bathing and mobility aids, household security devices, ergonomically designed knives and peelers, and faucet valves and knobs for temperature control. Medical supply stores and organizations are good sources of information about products that improve your quality of life and insure your safety.      Last Reviewed:

## 2021-09-22 NOTE — PROGRESS NOTES
Medicare Annual Wellness Visit  Name: Musa Fagan Date: 2021   MRN: [id number] Sex: Female   Age: 64 y.o. Ethnicity: Non- / Non    : 1945 Race: White (non-)      Maureen Elliott is here for Check-Up, Hypertension, and Medicare AWV    Screenings for behavioral, psychosocial and functional/safety risks, and cognitive dysfunction are all negative except as indicated below. These results, as well as other patient data from the [address] form, are documented in Flowsheets linked to this Encounter. Allergies   Allergen Reactions    Biaxin [Clarithromycin] Itching         Prior to Visit Medications    Medication Sig Taking? Authorizing Provider   losartan (COZAAR) 25 MG tablet TAKE 1 TABLET EVERY DAY Yes Cornel Dodge MD   atorvastatin (LIPITOR) 40 MG tablet TAKE 1 TABLET EVERY DAY Yes Cornel Dodge MD   pantoprazole (PROTONIX) 40 MG tablet TAKE 1 TABLET EVERY DAY Yes Cornel Dodge MD   alendronate (FOSAMAX) 70 MG tablet TAKE 1 TABLET EVERY 7 DAYS Yes Cornel Dodge MD   Cholecalciferol (VITAMIN D) 2000 units CAPS capsule Take by mouth Yes Historical Provider, MD   Calcium-Magnesium-Vitamin D 400-166.7-133.3 MG-MG-UNIT TABS Take 1 capsule by mouth daily Yes Historical Provider, MD   aspirin 81 MG EC tablet Take 81 mg by mouth daily.  Last dose 2014 for surgery 2014 Yes Historical Provider, MD         Past Medical History:   Diagnosis Date    Cancer ([address]     skin    Colon polyps     Diverticulosis 11/10    Hyperlipidemia     Hypertension     Miscarriage     x4    MR (mitral regurgitation)     Osteoarthritis     right knee    TR (tricuspid regurgitation)     Tubular adenoma of colon     Valvular disease        Past Surgical History:   Procedure Laterality Date    CARPAL TUNNEL RELEASE      right    COLONOSCOPY  11/10    DILATION AND CURETTAGE OF UTERUS      x4     PRE-MALIGNANT / BENIGN SKIN LESION EXCISION  2014 Nose    SKIN BIOPSY      SKIN CANCER EXCISION  July 2014    nose - Dr. Munir Byrnes ENDOSCOPY  12/08         Family History   Problem Relation Age of Onset    High Cholesterol Mother     Stroke Mother     Heart Disease Father     Breast Cancer Sister 68    Breast Cancer Paternal Aunt 48    Breast Cancer Maternal Cousin 52    Breast Cancer Paternal Cousin 64       CareTeam (Including outside providers/suppliers regularly involved in providing care):   Patient Care Team:  Sree Lake MD as PCP - General (Family Medicine)  Sree Lake MD as PCP - [facility] Empaneled Provider  Anatoly Schneider MD as Consulting Physician (Cardiology)    Wt Readings from Last 3 Encounters:   09/22/21 139 lb 6 oz (63.2 kg)   05/19/21 139 lb 8 oz (63.3 kg)   01/18/21 139 lb (63 kg)     Vitals:    09/22/21 1131   BP: 126/72   Site: Right Upper Arm   Position: Sitting   Cuff Size: Medium Adult   Pulse: 72   Resp: 16   Temp: 97.2 °F (36.2 °C)   TempSrc: Skin   Weight: 139 lb 6 oz (63.2 kg)   Height: 5' 1\" (1.549 m)     Body mass index is 26.33 kg/m². Based upon direct observation of the patient, evaluation of cognition reveals recent and remote memory intact. Patient's complete Health Risk Assessment and screening values have been reviewed and are found in Flowsheets. The following problems were reviewed today and where indicated follow up appointments were made and/or referrals ordered. Positive Risk Factor Screenings with Interventions:            General Health and ACP:  General  In general, how would you say your health is?: Very Good  In the past 7 days, have you experienced any of the following?  New or Increased Pain, New or Increased Fatigue, Loneliness, Social Isolation, Stress or Anger?: (!) Stress  Do you get the social and emotional support that you need?: Yes  Do you have a Living Will?: Yes  Advance Directives     Power of  Living Will ACP-Advance Directive ACP-Power of     Not on File Not on File Not on File Not on File      General Health Risk Interventions:  · Stress: she states that hse had fun strees she had a bridal shower at her home and then the wedding    Health Habits/Nutrition:  Health Habits/Nutrition  Do you exercise for at least 20 minutes 2-3 times per week?: (!) No  Have you lost any weight without trying in the past 3 months?: No  Do you eat only one meal per day?: No  Have you seen the dentist within the past year?: Yes  Body mass index: (!) 26.33  Health Habits/Nutrition Interventions:  · Inadequate physical activity:  we talked about walking she will think about      Safety:  Safety  Do you have working smoke detectors?: (!) No  Have all throw rugs been removed or fastened?: Yes  Do you have non-slip mats or surfaces in all bathtubs/showers?: Yes  Do all of your stairways have a railing or banister?: Yes  Are your doorways, halls and stairs free of clutter?: Yes  Do you always fasten your seatbelt when you are in a car?: Yes  Safety Interventions:  · Home safety tips provided  · we discussed how she should get some for the house they have carbon monoxide but not smoke.       Personalized Preventive Plan   Current Health Maintenance Status  Immunization History   Administered Date(s) Administered    Influenza 10/16/2012    Influenza Vaccine, unspecified formulation 09/26/2016, 09/27/2018, 10/07/2019    Influenza Virus Vaccine 09/27/2011, 11/20/2013, 10/29/2014, 10/22/2015    Influenza, MDCK Quadv, IM, PF (Flucelvax 2 yrs and older) 10/30/2017    Influenza, Koleen Eugenio, 6 mo and older, IM (Fluzone, Flulaval) 09/27/2018    Influenza, Quadv, IM, (6 mo and older Fluzone, Flulaval, Fluarix and 3 yrs and older Afluria) 10/07/2019, 11/11/2020    Influenza, Quadv, IM, PF (6 mo and older Fluzone, Flulaval, Fluarix, and 3 yrs and older Afluria) 09/26/2016    Pneumococcal Conjugate 13-valent ([id number]) 12/21/2016    Pneumococcal Polysaccharide ([id number]) 03/14/2018    Zoster Live (Zostavax) 11/10/2014        Health Maintenance   Topic Date Due    COVID-19 Vaccine (1) Never done    DTaP/Tdap/Td vaccine (1 - Tdap) Never done    Shingles Vaccine (2 of 3) 01/05/2015    Annual Wellness Visit (AWV)  Never done    Flu vaccine (1) 09/01/2021    Lipid screen  06/15/2022    Potassium monitoring  06/15/2022    Creatinine monitoring  06/15/2022    DEXA (modify frequency per FRAX score)  Completed    Pneumococcal 65+ years Vaccine  Completed    Hepatitis C screen  Completed    Hepatitis A vaccine  Aged Out    Hepatitis B vaccine  Aged Out    Hib vaccine  Aged Out    Meningococcal (ACWY) vaccine  Aged Out     Recommendations for [employer] Due: see orders and patient instructions/AVS.  . Recommended screening schedule for the next 5-10 years is provided to the patient in written form: see Patient Le Lara was seen today for check-up, hypertension and medicare awv. Diagnoses and all orders for this visit:    Essential hypertension  -     Comprehensive Metabolic Panel; Future  -     CBC Auto Differential; Future    Hyperlipidemia, unspecified hyperlipidemia type  -     Lipid Panel; Future  -     Comprehensive Metabolic Panel; Future    Gastroesophageal reflux disease, unspecified whether esophagitis present    Vitamin D deficiency  -     Vitamin D 25 Hydroxy; Future    Other orders  -     losartan (COZAAR) 25 MG tablet; TAKE 1 TABLET EVERY DAY  -     atorvastatin (LIPITOR) 40 MG tablet; TAKE 1 TABLET EVERY DAY             Advance Care Planning   Advanced Care Planning: Discussed the patients choices for care and treatment in case of a health event that adversely affects decision-making abilities. Also discussed the patients long-term treatment options.  Reviewed with the patient the [facility] of [address] [place] [address] Will Declaration forms  Reviewed the process of designating a competent adult as an Agent (or -in-fact) that could take make health care decisions for the patient if incompetent. Patient was asked to complete the declaration forms, either acknowledge the forms by a public notary or an eligible witness and provide a signed copy to the practice office. Cardiovascular Disease Risk Counseling: Assessed the patient's risk to develop cardiovascular disease and reviewed main risk factors. Reviewed steps to reduce disease risk including:   · Quitting tobacco use, reducing amount smoked, or not starting the habit                Kathryn Parson is a 64 y.o. female who presents today for:  Chief Complaint   Patient presents with    Check-Up    Hypertension    Medicare [facility]       HPI:     HPI    has a current medication list which includes the following prescription(s): losartan, atorvastatin, pantoprazole, alendronate, vitamin d, calcium-magnesium-vitamin d, and aspirin. Allergies   Allergen Reactions    Biaxin [Clarithromycin] Itching       Social History     Tobacco Use    Smoking status: Never Smoker    Smokeless tobacco: Never Used   Substance Use Topics    Alcohol use:  Yes     Alcohol/week: 0.0 standard drinks     Comment: 1 to 2 times a month    Drug use: No       Past Medical History:   Diagnosis Date    Cancer ([address]     skin    Colon polyps     Diverticulosis 11/10    Hyperlipidemia     Hypertension     Miscarriage     x4    MR (mitral regurgitation)     Osteoarthritis 06/11    right knee    TR (tricuspid regurgitation)     Tubular adenoma of colon     Valvular disease        Past Surgical History:   Procedure Laterality Date    CARPAL TUNNEL RELEASE      right    COLONOSCOPY  11/10    DILATION AND CURETTAGE OF UTERUS      x4     PRE-MALIGNANT / BENIGN SKIN LESION EXCISION  7/9/2014    Nose    SKIN BIOPSY      SKIN CANCER EXCISION  July 2014    nose - Dr. Shira Sullivan ENDOSCOPY  12/08       Family History   Problem Relation Age of Onset    High Cholesterol Mother     Stroke Mother     Heart Disease Father     Breast Cancer Sister 68    Breast Cancer Paternal Aunt 48    Breast Cancer Maternal Cousin 52    Breast Cancer Paternal Cousin 64     Subjective:     Review of Systems   Constitutional: Negative for activity change, appetite change, diaphoresis, fatigue and fever. HENT: Negative. Eyes: Negative. Respiratory: Negative for cough, chest tightness and shortness of breath. Cardiovascular: Negative for chest pain, palpitations and leg swelling. Gastrointestinal: Negative for abdominal pain, constipation, diarrhea, nausea and vomiting. Genitourinary: Negative. Musculoskeletal: Negative. Skin: Negative. Negative for rash. Neurological: Negative for dizziness, syncope, weakness, light-headedness, numbness and headaches. Psychiatric/Behavioral: Negative.        :   /72 (Site: Right Upper Arm, Position: Sitting, Cuff Size: Medium Adult)   Pulse 72   Temp 97.2 °F (36.2 °C) (Skin)   Resp 16   Ht 5' 1\" (1.549 m)   Wt 139 lb 6 oz (63.2 kg)   BMI 26.33 kg/m²   Wt Readings from Last 3 Encounters:   09/22/21 139 lb 6 oz (63.2 kg)   05/19/21 139 lb 8 oz (63.3 kg)   01/18/21 139 lb (63 kg)     Physical Exam  Vitals and nursing note reviewed. Constitutional:       General: She is not in acute distress. Appearance: She is well-developed. She is not diaphoretic. HENT:      Head: Normocephalic and atraumatic. Eyes:      General: No scleral icterus. Right eye: No discharge. Left eye: No discharge. Conjunctiva/sclera: Conjunctivae normal.      Pupils: Pupils are equal, round, and reactive to light. Neck:      Thyroid: No thyromegaly. Vascular: No JVD. Cardiovascular:      Rate and Rhythm: Normal rate and regular rhythm. Heart sounds: Normal heart sounds. No murmur heard. Pulmonary:      Effort: No respiratory distress.       Breath sounds: Normal breath sounds. No wheezing, rhonchi or rales. Abdominal:      General: Bowel sounds are normal. There is no distension. Palpations: Abdomen is soft. There is no mass. Tenderness: There is no abdominal tenderness. There is no guarding or rebound. Musculoskeletal:         General: Normal range of motion. Cervical back: Normal range of motion and neck supple. Lymphadenopathy:      Cervical: No cervical adenopathy. Skin:     General: Skin is warm and dry. Findings: No rash. Neurological:      Mental Status: She is alert and oriented to person, place, and time. Psychiatric:         Behavior: Behavior normal.       :       Diagnosis Orders   1. Essential hypertension  Comprehensive Metabolic Panel    CBC Auto Differential   2. Hyperlipidemia, unspecified hyperlipidemia type  Lipid Panel    Comprehensive Metabolic Panel   3. Gastroesophageal reflux disease, unspecified whether esophagitis present     4. Vitamin D deficiency  Vitamin D 25 Hydroxy       :      Requested Prescriptions     Signed Prescriptions Disp Refills    losartan (COZAAR) 25 MG tablet 90 tablet 1     Sig: TAKE 1 TABLET EVERY DAY    atorvastatin (LIPITOR) 40 MG tablet 90 tablet 1     Sig: TAKE 1 TABLET EVERY DAY     Current Outpatient Medications   Medication Sig Dispense Refill    losartan (COZAAR) 25 MG tablet TAKE 1 TABLET EVERY DAY 90 tablet 1    atorvastatin (LIPITOR) 40 MG tablet TAKE 1 TABLET EVERY DAY 90 tablet 1    pantoprazole (PROTONIX) 40 MG tablet TAKE 1 TABLET EVERY DAY 90 tablet 1    alendronate (FOSAMAX) 70 MG tablet TAKE 1 TABLET EVERY 7 DAYS 12 tablet 1    Cholecalciferol (VITAMIN D) 2000 units CAPS capsule Take by mouth      Calcium-Magnesium-Vitamin D 400-166.7-133.3 MG-MG-UNIT TABS Take 1 capsule by mouth daily      aspirin 81 MG EC tablet Take 81 mg by mouth daily. Last dose 7-1-2014 for surgery 7-9-2014       No current facility-administered medications for this visit.      Orders Placed This

## 2021-11-17 RX ORDER — ALENDRONATE SODIUM 70 MG/1
TABLET ORAL
Qty: 12 TABLET | Refills: 1 | Status: SHIPPED | OUTPATIENT
Start: 2021-11-17 | End: 2022-03-17 | Stop reason: SDUPTHER

## 2021-11-17 NOTE — TELEPHONE ENCOUNTER
Date of last visit:  9/22/2021   Date of next visit:  3/16/2022    Requested Prescriptions     Pending Prescriptions Disp Refills    alendronate (FOSAMAX) 70 MG tablet [Pharmacy Med Name: ALENDRONATE SODIUM 70 MG Tablet] 12 tablet 1     Sig: TAKE 1 TABLET EVERY 7 DAYS

## 2021-12-06 ENCOUNTER — TELEPHONE (OUTPATIENT)
Dept: FAMILY MEDICINE CLINIC | Age: 76
End: 2021-12-06

## 2021-12-06 DIAGNOSIS — R05.9 COUGH: Primary | ICD-10-CM

## 2021-12-06 RX ORDER — AMOXICILLIN AND CLAVULANATE POTASSIUM 500; 125 MG/1; MG/1
1 TABLET, FILM COATED ORAL 3 TIMES DAILY
Qty: 30 TABLET | Refills: 0 | Status: SHIPPED | OUTPATIENT
Start: 2021-12-06 | End: 2021-12-16

## 2021-12-06 NOTE — TELEPHONE ENCOUNTER
Patient called C/O head congestion, productive cough, slightly yellow. No fever and feels ok otherwise.   Req RX to [facility].    Please call patient

## 2021-12-09 ENCOUNTER — TELEPHONE (OUTPATIENT)
Dept: FAMILY MEDICINE CLINIC | Age: 76
End: 2021-12-09

## 2021-12-09 NOTE — TELEPHONE ENCOUNTER
Pt called stating that since starting Augmentin she has been having diarrhea. She is wanting to hold off on another antibiotic at this time. She stated that she will call back if she feels she needs something else. She is wanting it documented that she is not able to take this medication due to diarrhea.

## 2021-12-20 LAB
ABSOLUTE BASO #: 0.1 X10E9/L (ref 0–0.2)
ABSOLUTE EOS #: 0.3 X10E9/L (ref 0–0.4)
ABSOLUTE LYMPH #: 2 X10E9/L (ref 1–3.5)
ABSOLUTE MONO #: 0.4 X10E9/L (ref 0–0.9)
ABSOLUTE NEUT #: 3.4 X10E9/L (ref 1.5–6.6)
ALBUMIN SERPL-MCNC: 4.2 G/DL (ref 3.2–5.3)
ALK PHOSPHATASE: 93 U/L (ref 39–130)
ALT SERPL-CCNC: 11 U/L (ref 0–31)
ANION GAP SERPL CALCULATED.3IONS-SCNC: 10 MMOL/L (ref 5–15)
AST SERPL-CCNC: 17 U/L (ref 0–41)
BASOPHILS RELATIVE PERCENT: 1.2 %
BILIRUB SERPL-MCNC: 0.7 MG/DL (ref 0.3–1.2)
BUN BLDV-MCNC: 15 MG/DL (ref 5–27)
CALCIUM SERPL-MCNC: 9.5 MG/DL (ref 8.5–10.5)
CHLORIDE BLD-SCNC: 105 MMOL/L (ref 98–109)
CHOLESTEROL/HDL RATIO: 3.4 (ref 1–5)
CHOLESTEROL: 198 MG/DL (ref 150–200)
CO2: 27 MMOL/L (ref 22–32)
CREAT SERPL-MCNC: 1.04 MG/DL (ref 0.4–1)
EGFR AFRICAN AMERICAN: >60 ML/MIN/1.73SQ.M
EGFR IF NONAFRICAN AMERICAN: 51 ML/MIN/1.73SQ.M
EOSINOPHILS RELATIVE PERCENT: 4.2 %
GLUCOSE: 101 MG/DL (ref 65–99)
HCT VFR BLD CALC: 36.7 % (ref 35–47)
HDLC SERPL-MCNC: 59 MG/DL
HEMOGLOBIN: 11.9 G/DL (ref 11.7–15.5)
LDL CHOLESTEROL CALCULATED: 111 MG/DL
LDL/HDL RATIO: 1.9
LYMPHOCYTE %: 32.6 %
MCH RBC QN AUTO: 29.6 PG (ref 27–34)
MCHC RBC AUTO-ENTMCNC: 32.5 G/DL (ref 32–36)
MCV RBC AUTO: 91 FL (ref 80–100)
MONOCYTES # BLD: 6.1 %
NEUTROPHILS RELATIVE PERCENT: 55.9 %
PDW BLD-RTO: 14.9 % (ref 11.5–15)
PLATELETS: 444 X10E9/L (ref 150–450)
PMV BLD AUTO: 8.1 FL (ref 7–12)
POTASSIUM SERPL-SCNC: 4.3 MMOL/L (ref 3.5–5)
RBC: 4.03 X10E12/L (ref 3.8–5.2)
SODIUM BLD-SCNC: 142 MMOL/L (ref 134–146)
TOTAL PROTEIN: 6.9 G/DL (ref 6–8)
TRIGL SERPL-MCNC: 138 MG/DL (ref 27–150)
VITAMIN D 25-HYDROXY: 62.1 NG/ML (ref 30–100)
VLDLC SERPL CALC-MCNC: 28 MG/DL (ref 0–30)
WBC: 6.2 X10E9/L (ref 4–11)

## 2022-03-17 NOTE — TELEPHONE ENCOUNTER
The pharmacy is  requesting a refill of the below medication which has been pended for you:     Requested Prescriptions     Pending Prescriptions Disp Refills    alendronate (FOSAMAX) 70 MG tablet 12 tablet 1     Sig: TAKE 1 TABLET EVERY 7 DAYS       Last Appointment Date: 9/22/2021  Next Appointment Date: Visit date not found    Allergies   Allergen Reactions    Augmentin [Amoxicillin-Pot Clavulanate] Diarrhea    Biaxin [Clarithromycin] Itching

## 2022-03-18 RX ORDER — ALENDRONATE SODIUM 70 MG/1
TABLET ORAL
Qty: 12 TABLET | Refills: 1 | Status: SHIPPED | OUTPATIENT
Start: 2022-03-18 | End: 2022-04-04 | Stop reason: SDUPTHER

## 2022-04-04 ENCOUNTER — OFFICE VISIT (OUTPATIENT)
Dept: FAMILY MEDICINE CLINIC | Age: 77
End: 2022-04-04

## 2022-04-04 VITALS
HEIGHT: 61 IN | RESPIRATION RATE: 12 BRPM | WEIGHT: 141 LBS | BODY MASS INDEX: 26.62 KG/M2 | DIASTOLIC BLOOD PRESSURE: 80 MMHG | SYSTOLIC BLOOD PRESSURE: 134 MMHG | TEMPERATURE: 97.9 F | HEART RATE: 76 BPM

## 2022-04-04 DIAGNOSIS — K21.9 GASTROESOPHAGEAL REFLUX DISEASE, UNSPECIFIED WHETHER ESOPHAGITIS PRESENT: ICD-10-CM

## 2022-04-04 DIAGNOSIS — I10 ESSENTIAL HYPERTENSION: Primary | ICD-10-CM

## 2022-04-04 DIAGNOSIS — N18.31 STAGE 3A CHRONIC KIDNEY DISEASE (HCC): ICD-10-CM

## 2022-04-04 DIAGNOSIS — M81.0 AGE-RELATED OSTEOPOROSIS WITHOUT CURRENT PATHOLOGICAL FRACTURE: ICD-10-CM

## 2022-04-04 DIAGNOSIS — E78.5 HYPERLIPIDEMIA, UNSPECIFIED HYPERLIPIDEMIA TYPE: ICD-10-CM

## 2022-04-04 DIAGNOSIS — E55.9 VITAMIN D DEFICIENCY: ICD-10-CM

## 2022-04-04 PROCEDURE — 1036F TOBACCO NON-USER: CPT | Performed by: FAMILY MEDICINE

## 2022-04-04 PROCEDURE — G8399 PT W/DXA RESULTS DOCUMENT: HCPCS | Performed by: FAMILY MEDICINE

## 2022-04-04 PROCEDURE — G8427 DOCREV CUR MEDS BY ELIG CLIN: HCPCS | Performed by: FAMILY MEDICINE

## 2022-04-04 PROCEDURE — 99213 OFFICE O/P EST LOW 20 MIN: CPT | Performed by: FAMILY MEDICINE

## 2022-04-04 PROCEDURE — G8417 CALC BMI ABV UP PARAM F/U: HCPCS | Performed by: FAMILY MEDICINE

## 2022-04-04 PROCEDURE — 1123F ACP DISCUSS/DSCN MKR DOCD: CPT | Performed by: FAMILY MEDICINE

## 2022-04-04 PROCEDURE — 1090F PRES/ABSN URINE INCON ASSESS: CPT | Performed by: FAMILY MEDICINE

## 2022-04-04 RX ORDER — PANTOPRAZOLE SODIUM 40 MG/1
TABLET, DELAYED RELEASE ORAL
Qty: 90 TABLET | Refills: 1 | Status: SHIPPED | OUTPATIENT
Start: 2022-04-04 | End: 2022-11-01

## 2022-04-04 RX ORDER — ATORVASTATIN CALCIUM 40 MG/1
TABLET, FILM COATED ORAL
Qty: 90 TABLET | Refills: 1 | Status: SHIPPED | OUTPATIENT
Start: 2022-04-04 | End: 2022-10-28

## 2022-04-04 RX ORDER — ALENDRONATE SODIUM 70 MG/1
TABLET ORAL
Qty: 12 TABLET | Refills: 1 | Status: SHIPPED | OUTPATIENT
Start: 2022-04-04 | End: 2022-08-24

## 2022-04-04 RX ORDER — LOSARTAN POTASSIUM 25 MG/1
TABLET ORAL
Qty: 90 TABLET | Refills: 1 | Status: SHIPPED | OUTPATIENT
Start: 2022-04-04 | End: 2022-10-24

## 2022-04-04 ASSESSMENT — ENCOUNTER SYMPTOMS
CONSTIPATION: 0
VOMITING: 0
NAUSEA: 0
SHORTNESS OF BREATH: 0
COUGH: 0
EYES NEGATIVE: 1
ABDOMINAL PAIN: 0
DIARRHEA: 0
CHEST TIGHTNESS: 0

## 2022-04-04 NOTE — PROGRESS NOTES
Date: 4/4/2022    Gayathri Ochoa is a 76 y.o. female who presents today for:  Chief Complaint   Patient presents with    Hyperlipidemia    Hypertension stable       HPI:     Hyperlipidemia  This is a chronic problem. The current episode started more than 1 year ago. The problem is controlled. Recent lipid tests were reviewed and are normal. Pertinent negatives include no chest pain, focal sensory loss, focal weakness, leg pain, myalgias or shortness of breath. Current antihyperlipidemic treatment includes statins. The current treatment provides significant improvement of lipids. Compliance problems include adherence to exercise. Risk factors for coronary artery disease include dyslipidemia and hypertension. Hypertension  This is a chronic problem. The current episode started more than 1 year ago. The problem is unchanged. The problem is controlled. Pertinent negatives include no anxiety, chest pain, headaches, malaise/fatigue, neck pain, palpitations, peripheral edema or shortness of breath. Risk factors for coronary artery disease include dyslipidemia and post-menopausal state. Past treatments include angiotensin blockers. The current treatment provides significant improvement. There are no compliance problems. Hypertensive end-organ damage includes CAD/MI. has a current medication list which includes the following prescription(s): atorvastatin, pantoprazole, losartan, alendronate, vitamin d, calcium-magnesium-vitamin d, and aspirin. Allergies   Allergen Reactions    Augmentin [Amoxicillin-Pot Clavulanate] Diarrhea    Biaxin [Clarithromycin] Itching       Social History     Tobacco Use    Smoking status: Never Smoker    Smokeless tobacco: Never Used   Substance Use Topics    Alcohol use:  Yes     Alcohol/week: 0.0 standard drinks     Comment: 1 to 2 times a month    Drug use: No       Past Medical History:   Diagnosis Date    Cancer ([facility] [address]     skin    Colon polyps     Diverticulosis 11/10  Hyperlipidemia     Hypertension     Miscarriage     x4    MR (mitral regurgitation)     Osteoarthritis 06/11    right knee    TR (tricuspid regurgitation)     Tubular adenoma of colon     Valvular disease        Past Surgical History:   Procedure Laterality Date    CARPAL TUNNEL RELEASE      right    COLONOSCOPY  11/10    DILATION AND CURETTAGE OF UTERUS      x4     PRE-MALIGNANT / BENIGN SKIN LESION EXCISION  7/9/2014    Nose    SKIN BIOPSY      SKIN CANCER EXCISION  July 2014    nose - Dr. Agus Aguayo GASTROINTESTINAL ENDOSCOPY  12/08       Family History   Problem Relation Age of Onset    High Cholesterol Mother     Stroke Mother     Heart Disease Father     Breast Cancer Sister 68    Breast Cancer Paternal Aunt 48    Breast Cancer Maternal Cousin 52    Breast Cancer Paternal Cousin 64     Subjective:     Review of Systems   Constitutional: Negative for activity change, appetite change, diaphoresis, fatigue, fever and malaise/fatigue. HENT:        She feels like a noise in her right ear if she plugs her ear   Eyes: Negative. Respiratory: Negative for cough, chest tightness and shortness of breath. Cardiovascular: Negative for chest pain, palpitations and leg swelling. Gastrointestinal: Negative for abdominal pain, constipation, diarrhea, nausea and vomiting. Genitourinary: Negative. Musculoskeletal: Negative. Negative for myalgias and neck pain. Skin: Negative. Negative for rash. Neurological: Negative for dizziness, focal weakness, syncope, weakness, light-headedness, numbness and headaches.    Psychiatric/Behavioral: Negative.        :   /80 (Site: Right Upper Arm, Position: Sitting, Cuff Size: Medium Adult)   Pulse 76   Temp 97.9 °F (36.6 °C) (Oral)   Resp 12   Ht 5' 1\" (1.549 m)   Wt 141 lb (64 kg)   BMI 26.64 kg/m²   Wt Readings from Last 3 Encounters:   04/04/22 141 lb (64 kg)   09/22/21 139 lb 6 oz (63.2 kg)   05/19/21 139 lb 8 oz (63.3 kg)     Physical Exam  Vitals and nursing note reviewed. Constitutional:       General: She is not in acute distress. Appearance: She is well-developed. She is not diaphoretic. HENT:      Head: Normocephalic and atraumatic. Right Ear: Hearing normal. Drainage (she has ear wax) present. Left Ear: Hearing, tympanic membrane, ear canal and external ear normal.   Eyes:      General: No scleral icterus. Right eye: No discharge. Left eye: No discharge. Conjunctiva/sclera: Conjunctivae normal.      Pupils: Pupils are equal, round, and reactive to light. Neck:      Thyroid: No thyromegaly. Vascular: No JVD. Cardiovascular:      Rate and Rhythm: Normal rate and regular rhythm. Heart sounds: Normal heart sounds. No murmur heard. Pulmonary:      Effort: No respiratory distress. Breath sounds: Normal breath sounds. No wheezing, rhonchi or rales. Abdominal:      General: Bowel sounds are normal. There is no distension. Palpations: Abdomen is soft. There is no mass. Tenderness: There is no abdominal tenderness. There is no guarding or rebound. Musculoskeletal:         General: Normal range of motion. Cervical back: Normal range of motion and neck supple. Lymphadenopathy:      Cervical: No cervical adenopathy. Skin:     General: Skin is warm and dry. Findings: No rash. Neurological:      Mental Status: She is alert and oriented to person, place, and time. Psychiatric:         Behavior: Behavior normal.       :       Diagnosis Orders   1. Essential hypertension  losartan (COZAAR) 25 MG tablet    Comprehensive Metabolic Panel   2. Hyperlipidemia, unspecified hyperlipidemia type  atorvastatin (LIPITOR) 40 MG tablet    Lipid Panel    Comprehensive Metabolic Panel   3. Vitamin D deficiency  Vitamin D 25 Hydroxy   4.  Age-related osteoporosis without current pathological fracture  alendronate (FOSAMAX) 70 MG tablet    VIVIAN Dexa Bone Density Scan   5. Gastroesophageal reflux disease, unspecified whether esophagitis present  pantoprazole (PROTONIX) 40 MG tablet   6. Stage 3a chronic kidney disease (HCC)  Comprehensive Metabolic Panel       :      Requested Prescriptions     Signed Prescriptions Disp Refills    atorvastatin (LIPITOR) 40 MG tablet 90 tablet 1     Sig: TAKE 1 TABLET EVERY DAY    pantoprazole (PROTONIX) 40 MG tablet 90 tablet 1     Sig: TAKE 1 TABLET EVERY DAY    losartan (COZAAR) 25 MG tablet 90 tablet 1     Sig: TAKE 1 TABLET EVERY DAY    alendronate (FOSAMAX) 70 MG tablet 12 tablet 1     Sig: TAKE 1 TABLET EVERY 7 DAYS     Current Outpatient Medications   Medication Sig Dispense Refill    atorvastatin (LIPITOR) 40 MG tablet TAKE 1 TABLET EVERY DAY 90 tablet 1    pantoprazole (PROTONIX) 40 MG tablet TAKE 1 TABLET EVERY DAY 90 tablet 1    losartan (COZAAR) 25 MG tablet TAKE 1 TABLET EVERY DAY 90 tablet 1    alendronate (FOSAMAX) 70 MG tablet TAKE 1 TABLET EVERY 7 DAYS 12 tablet 1    Cholecalciferol (VITAMIN D) 2000 units CAPS capsule Take by mouth      Calcium-Magnesium-Vitamin D 400-166.7-133.3 MG-MG-UNIT TABS Take 1 capsule by mouth daily      aspirin 81 MG EC tablet Take 81 mg by mouth daily. Last dose 7-1-2014 for surgery 7-9-2014       No current facility-administered medications for this visit. Orders Placed This Encounter   Procedures    VIVIAN Dexa Bone Density Scan     Standing Status:   Future     Standing Expiration Date:   6/5/2023    Lipid Panel     Standing Status:   Future     Standing Expiration Date:   4/4/2023     Order Specific Question:   Is Patient Fasting?/# of Hours     Answer:   yes 12 hours    Comprehensive Metabolic Panel     Standing Status:   Future     Standing Expiration Date:   4/4/2023    Vitamin D 25 Hydroxy     Standing Status:   Future     Standing Expiration Date:   4/4/2023     May use OTC wax softeners. Continue current medications.     Return in about 4 months (around 8/4/2022) for HTN. Discussed use, benefit, and side effects of prescribed medications. All patient questions answered. Pt voiced understanding. Instructed to continue current medications,diet and exercise. Patient agreed with treatment plan.

## 2022-04-04 NOTE — PROGRESS NOTES
Have you seen any other physician or provider since your last visit no    Have you had any other diagnostic tests since your last visit? no    Have you changed or stopped any medications since your last visit including any over-the-counter medicines, vitamins, or herbal medicines? no     Are you taking all your prescribed medications? Yes    If NO, why? No components found for: CHLPL  Lab Results   Component Value Date    TRIG 138 12/20/2021     Lab Results   Component Value Date    HDL 59 12/20/2021     Lab Results   Component Value Date    LDLCALC 111 12/20/2021     Lab Results   Component Value Date    LABVLDL 28 12/20/2021       Lab Results   Component Value Date    ALT 11 12/20/2021    AST 17 12/20/2021    ALKPHOS 93 12/20/2021    BILITOT 0.7 12/20/2021           Is patient currently taking any cholesterol medications? Yes   If yes, see med list as above    Is the patient reporting any side effects of cholesterol medications? Yes    Is the patient taking any over the counter medications? Yes   If yes, see med list as above    Is the patient taking a daily aspirin? Yes    Patient Self-Management Goal for Chronic Condition  Goal: I will take all medications as prescribed by my doctor, and I will call the office if I am having any medication problems.   Barriers to success: none  Plan for overcoming my barriers: N/A     Confidence: 10/10  Date goal set: 4/4/22  Date goal attained: \

## 2022-04-19 ENCOUNTER — TELEMEDICINE (OUTPATIENT)
Dept: FAMILY MEDICINE CLINIC | Age: 77
End: 2022-04-19

## 2022-04-19 DIAGNOSIS — J40 BRONCHITIS: Primary | ICD-10-CM

## 2022-04-19 PROBLEM — J98.8 RESPIRATORY TRACT INFECTION DUE TO COVID-19 VIRUS: Status: ACTIVE | Noted: 2020-12-10

## 2022-04-19 PROBLEM — U07.1 RESPIRATORY TRACT INFECTION DUE TO COVID-19 VIRUS: Status: ACTIVE | Noted: 2020-12-10

## 2022-04-19 PROCEDURE — 99213 OFFICE O/P EST LOW 20 MIN: CPT | Performed by: EMERGENCY MEDICINE

## 2022-04-19 PROCEDURE — 1036F TOBACCO NON-USER: CPT | Performed by: EMERGENCY MEDICINE

## 2022-04-19 PROCEDURE — G8417 CALC BMI ABV UP PARAM F/U: HCPCS | Performed by: EMERGENCY MEDICINE

## 2022-04-19 PROCEDURE — G8427 DOCREV CUR MEDS BY ELIG CLIN: HCPCS | Performed by: EMERGENCY MEDICINE

## 2022-04-19 PROCEDURE — G8399 PT W/DXA RESULTS DOCUMENT: HCPCS | Performed by: EMERGENCY MEDICINE

## 2022-04-19 RX ORDER — LEVOFLOXACIN 500 MG/1
500 TABLET, FILM COATED ORAL DAILY
Qty: 8 TABLET | Refills: 0 | Status: SHIPPED | OUTPATIENT
Start: 2022-04-19 | End: 2022-04-27

## 2022-04-19 RX ORDER — ALBUTEROL SULFATE 90 UG/1
2 AEROSOL, METERED RESPIRATORY (INHALATION) EVERY 6 HOURS PRN
Qty: 18 G | Refills: 0 | Status: SHIPPED | OUTPATIENT
Start: 2022-04-19 | End: 2022-07-13 | Stop reason: ALTCHOICE

## 2022-04-19 ASSESSMENT — ENCOUNTER SYMPTOMS
ABDOMINAL PAIN: 0
NAUSEA: 0
DIARRHEA: 0
TROUBLE SWALLOWING: 0
SHORTNESS OF BREATH: 0
RHINORRHEA: 0
COUGH: 1
SORE THROAT: 0
VOICE CHANGE: 0
WHEEZING: 1
CHEST TIGHTNESS: 0
BACK PAIN: 0
SINUS PRESSURE: 0
VOMITING: 0
CONSTIPATION: 0

## 2022-04-19 NOTE — PROGRESS NOTES
Telemedicine visit :  Date: 4/19/2022     Patient verified Video Chat was not encrypted, and agrees to the Video Exam in presence of nurse. Denice Quintanilla is a 76 y. o.female who presents today  Chief Complaint   Patient presents with    Cough    Chest Congestion         HPI:     HPI    Patient was seen via telemedicine, face time with his/her phone. Cough and chest congestion x 2 day, no phlem , no fever no chills , no chest pain. + Wheezy. Patient's been taking over-the-counter Robitussin but given her tachycardia. No anosmia no ageusia no diarrhea. Patient had a history of COVID infection 12/10/2020      CurrentHome Medications:  Current Outpatient Medications   Medication Sig Dispense Refill    levoFLOXacin (LEVAQUIN) 500 MG tablet Take 1 tablet by mouth daily for 8 days 8 tablet 0    albuterol sulfate HFA (PROVENTIL HFA) 108 (90 Base) MCG/ACT inhaler Inhale 2 puffs into the lungs every 6 hours as needed for Wheezing 18 g 0    Spacer/Aero-Holding Chambers FERNY 1 Device by Does not apply route daily as needed (Use it with the albuterol inhaler 3-4 times daily as needed) 1 each 0    atorvastatin (LIPITOR) 40 MG tablet TAKE 1 TABLET EVERY DAY 90 tablet 1    pantoprazole (PROTONIX) 40 MG tablet TAKE 1 TABLET EVERY DAY 90 tablet 1    losartan (COZAAR) 25 MG tablet TAKE 1 TABLET EVERY DAY 90 tablet 1    alendronate (FOSAMAX) 70 MG tablet TAKE 1 TABLET EVERY 7 DAYS 12 tablet 1    Cholecalciferol (VITAMIN D) 2000 units CAPS capsule Take by mouth      Calcium-Magnesium-Vitamin D 400-166.7-133.3 MG-MG-UNIT TABS Take 1 capsule by mouth daily      aspirin 81 MG EC tablet Take 81 mg by mouth daily. Last dose 7-1-2014 for surgery 7-9-2014       No current facility-administered medications for this visit. Subjective:      Review of Systems   Constitutional: Negative for appetite change, chills, diaphoresis, fatigue and fever. HENT: Positive for congestion.  Negative for ear pain, postnasal drip, rhinorrhea, sinus pressure, sneezing, sore throat, trouble swallowing and voice change. Respiratory: Positive for cough and wheezing. Negative for chest tightness and shortness of breath. Cardiovascular: Negative for chest pain, palpitations and leg swelling. Gastrointestinal: Negative for abdominal pain, constipation, diarrhea, nausea and vomiting. Musculoskeletal: Negative for arthralgias, back pain, joint swelling, myalgias, neck pain and neck stiffness. Neurological: Negative for dizziness, syncope, weakness, light-headedness, numbness and headaches. Objective: There were no vitals taken for this visit. BP Readings from Last 3 Encounters:   22 134/80   21 126/72   21 134/68     Wt Readings from Last 3 Encounters:   22 141 lb (64 kg)   21 139 lb 6 oz (63.2 kg)   21 139 lb 8 oz (63.3 kg)       Physical Exam    Physical Examination:  not done, this is a telemedicine  Patient is looking alert, active, cooperative , no difficulty of breathing    Assessment:         Diagnosis Orders   1. Bronchitis         :      Medications Prescribed:  Orders Placed This Encounter   Medications    levoFLOXacin (LEVAQUIN) 500 MG tablet     Sig: Take 1 tablet by mouth daily for 8 days     Dispense:  8 tablet     Refill:  0    albuterol sulfate HFA (PROVENTIL HFA) 108 (90 Base) MCG/ACT inhaler     Sig: Inhale 2 puffs into the lungs every 6 hours as needed for Wheezing     Dispense:  18 g     Refill:  0    Spacer/Aero-Holding Chambers FERNY     Si Device by Does not apply route daily as needed (Use it with the albuterol inhaler 3-4 times daily as needed)     Dispense:  1 each     Refill:  0     Orders Placed:  No orders of the defined types were placed in this encounter. Telemedicine time : 15 minutes    Discussed with the patient regarding follow-up especially if no better in the next 3 to 5 days and we can get a chest x-ray.     Advised to hydrate himself well, eat balance diet, over-the-counter vitamin C vitamin D, zinc and melatonin over-the-counter      Call or Return if symptoms worsen or fail to improve. Discussed use, benefit, and side effects of prescribedmedications. All patient questions answered. Pt voiced understanding. Instructedto continue current medications, diet and exercise. Patient agreed with treatmentplan. Cristo Sweeney was evaluated through a synchronous (real-time) audio-video encounter. The patient (or guardian if applicable) is aware that this is a billable service. Verbal consent to proceed has been obtained within the past 12 months. The visit was conducted pursuant to the emergency declaration under the [facility], [address] [place] [address] authority and the [employer] Act. Patient identification was verified, and a caregiver was present when appropriate. The patient was located in a state where the provider was credentialed to provide care.

## 2022-05-03 PROBLEM — K21.9 GERD (GASTROESOPHAGEAL REFLUX DISEASE): Status: ACTIVE | Noted: 2022-05-03

## 2022-06-15 ENCOUNTER — HOSPITAL ENCOUNTER (OUTPATIENT)
Dept: WOMENS IMAGING | Age: 77
Discharge: HOME | End: 2022-06-15
Payer: MEDICARE

## 2022-06-15 DIAGNOSIS — Z12.31 BREAST CANCER SCREENING BY MAMMOGRAM: ICD-10-CM

## 2022-06-15 PROCEDURE — 77063 BREAST TOMOSYNTHESIS BI: CPT

## 2022-07-07 ENCOUNTER — HOSPITAL ENCOUNTER (OUTPATIENT)
Dept: WOMENS IMAGING | Age: 77
Discharge: HOME | End: 2022-07-07
Payer: MEDICARE

## 2022-07-07 DIAGNOSIS — M81.0 AGE-RELATED OSTEOPOROSIS WITHOUT CURRENT PATHOLOGICAL FRACTURE: ICD-10-CM

## 2022-07-07 PROCEDURE — 77080 DXA BONE DENSITY AXIAL: CPT

## 2022-07-11 ENCOUNTER — TELEPHONE (OUTPATIENT)
Dept: FAMILY MEDICINE CLINIC | Age: 77
End: 2022-07-11

## 2022-07-11 NOTE — TELEPHONE ENCOUNTER
Called and Lisandra Joy answered, he stated that she was out of the house and would call back to make an appt.

## 2022-07-11 NOTE — TELEPHONE ENCOUNTER
----- Message from Brenda Jaquez MD sent at 7/10/2022  6:43 PM EDT -----  Follow up please it can be next month if needed

## 2022-07-12 LAB
ALBUMIN SERPL-MCNC: 4.3 G/DL (ref 3.5–5.2)
ALK PHOSPHATASE: 101 U/L (ref 40–142)
ALT SERPL-CCNC: 10 U/L (ref 5–40)
ANION GAP SERPL CALCULATED.3IONS-SCNC: 10 MEQ/L (ref 10–19)
AST SERPL-CCNC: 15 U/L (ref 9–40)
BILIRUB SERPL-MCNC: 0.6 MG/DL
BUN BLDV-MCNC: 14 MG/DL (ref 8–23)
CALCIUM SERPL-MCNC: 9.1 MG/DL (ref 8.5–10.5)
CHLORIDE BLD-SCNC: 99 MEQ/L (ref 95–107)
CHOLESTEROL/HDL RATIO: 3 RATIO
CHOLESTEROL: 181 MG/DL
CO2: 25 MEQ/L (ref 19–31)
CREAT SERPL-MCNC: 1.11 MG/DL (ref 0.6–1.3)
EGFR IF NONAFRICAN AMERICAN: 51 ML/MIN/1.73
GLUCOSE: 103 MG/DL (ref 70–99)
HDLC SERPL-MCNC: 60 MG/DL
LDL CHOLESTEROL CALCULATED: 99 MG/DL
LDL/HDL RATIO: 1.7 RATIO
POTASSIUM SERPL-SCNC: 4.1 MEQ/L (ref 3.5–5.4)
SODIUM BLD-SCNC: 134 MEQ/L (ref 133–146)
TOTAL PROTEIN: 6.4 G/DL (ref 6.1–8.3)
TRIGL SERPL-MCNC: 109 MG/DL
VLDLC SERPL CALC-MCNC: 22 MG/DL

## 2022-07-13 ENCOUNTER — OFFICE VISIT (OUTPATIENT)
Dept: FAMILY MEDICINE CLINIC | Age: 77
End: 2022-07-13

## 2022-07-13 VITALS
BODY MASS INDEX: 25.79 KG/M2 | HEIGHT: 61 IN | WEIGHT: 136.6 LBS | SYSTOLIC BLOOD PRESSURE: 126 MMHG | DIASTOLIC BLOOD PRESSURE: 70 MMHG | HEART RATE: 72 BPM | RESPIRATION RATE: 12 BRPM

## 2022-07-13 DIAGNOSIS — K21.9 GASTROESOPHAGEAL REFLUX DISEASE, UNSPECIFIED WHETHER ESOPHAGITIS PRESENT: ICD-10-CM

## 2022-07-13 DIAGNOSIS — E78.5 HYPERLIPIDEMIA, UNSPECIFIED HYPERLIPIDEMIA TYPE: ICD-10-CM

## 2022-07-13 DIAGNOSIS — M81.0 AGE-RELATED OSTEOPOROSIS WITHOUT CURRENT PATHOLOGICAL FRACTURE: ICD-10-CM

## 2022-07-13 DIAGNOSIS — I10 ESSENTIAL HYPERTENSION: Primary | ICD-10-CM

## 2022-07-13 DIAGNOSIS — E55.9 VITAMIN D DEFICIENCY: ICD-10-CM

## 2022-07-13 LAB — VITAMIN D 25-HYDROXY: 59 NG/ML

## 2022-07-13 PROCEDURE — 1036F TOBACCO NON-USER: CPT | Performed by: FAMILY MEDICINE

## 2022-07-13 PROCEDURE — 99213 OFFICE O/P EST LOW 20 MIN: CPT | Performed by: FAMILY MEDICINE

## 2022-07-13 PROCEDURE — G8417 CALC BMI ABV UP PARAM F/U: HCPCS | Performed by: FAMILY MEDICINE

## 2022-07-13 PROCEDURE — 1123F ACP DISCUSS/DSCN MKR DOCD: CPT | Performed by: FAMILY MEDICINE

## 2022-07-13 PROCEDURE — 1090F PRES/ABSN URINE INCON ASSESS: CPT | Performed by: FAMILY MEDICINE

## 2022-07-13 PROCEDURE — G8427 DOCREV CUR MEDS BY ELIG CLIN: HCPCS | Performed by: FAMILY MEDICINE

## 2022-07-13 PROCEDURE — G8399 PT W/DXA RESULTS DOCUMENT: HCPCS | Performed by: FAMILY MEDICINE

## 2022-07-13 SDOH — ECONOMIC STABILITY: FOOD INSECURITY: WITHIN THE PAST 12 MONTHS, YOU WORRIED THAT YOUR FOOD WOULD RUN OUT BEFORE YOU GOT MONEY TO BUY MORE.: NEVER TRUE

## 2022-07-13 SDOH — ECONOMIC STABILITY: FOOD INSECURITY: WITHIN THE PAST 12 MONTHS, THE FOOD YOU BOUGHT JUST DIDN'T LAST AND YOU DIDN'T HAVE MONEY TO GET MORE.: NEVER TRUE

## 2022-07-13 ASSESSMENT — ENCOUNTER SYMPTOMS
NAUSEA: 0
EYES NEGATIVE: 1
COUGH: 0
SHORTNESS OF BREATH: 0
CONSTIPATION: 0
VOMITING: 0
ABDOMINAL PAIN: 0
CHEST TIGHTNESS: 0
DIARRHEA: 0

## 2022-07-13 ASSESSMENT — PATIENT HEALTH QUESTIONNAIRE - PHQ9
SUM OF ALL RESPONSES TO PHQ QUESTIONS 1-9: 0
SUM OF ALL RESPONSES TO PHQ9 QUESTIONS 1 & 2: 0
2. FEELING DOWN, DEPRESSED OR HOPELESS: 0
1. LITTLE INTEREST OR PLEASURE IN DOING THINGS: 0
SUM OF ALL RESPONSES TO PHQ QUESTIONS 1-9: 0

## 2022-07-13 ASSESSMENT — SOCIAL DETERMINANTS OF HEALTH (SDOH): HOW HARD IS IT FOR YOU TO PAY FOR THE VERY BASICS LIKE FOOD, HOUSING, MEDICAL CARE, AND HEATING?: NOT HARD AT ALL

## 2022-07-13 NOTE — PROGRESS NOTES
11/01/2010    GERD (gastroesophageal reflux disease) 5/3/2022    Hyperlipidemia     Hypertension     Miscarriage     x4    MR (mitral regurgitation)     Osteoarthritis 06/01/2011    right knee    Respiratory tract infection due to COVID-19 virus 12/16/2020    TR (tricuspid regurgitation)     Tubular adenoma of colon     Valvular disease        Past Surgical History:   Procedure Laterality Date    CARPAL TUNNEL RELEASE      right    COLONOSCOPY  11/10    DILATION AND CURETTAGE OF UTERUS      x4     PRE-MALIGNANT / BENIGN SKIN LESION EXCISION  7/9/2014    Nose    SKIN BIOPSY      SKIN CANCER EXCISION  July 2014    nose - Dr. Adithya Florez ENDOSCOPY  12/08       Family History   Problem Relation Age of Onset    High Cholesterol Mother     Stroke Mother     Heart Disease Father     Breast Cancer Sister 68    Breast Cancer Paternal Aunt 48    Breast Cancer Maternal Cousin 52    Breast Cancer Paternal Cousin 64     Subjective:     Review of Systems   Constitutional: Negative for activity change, appetite change, diaphoresis, fatigue and fever. HENT: Negative. Eyes: Negative. Respiratory: Negative for cough, chest tightness and shortness of breath. Cardiovascular: Negative for chest pain, palpitations and leg swelling. Gastrointestinal: Negative for abdominal pain, constipation, diarrhea, nausea and vomiting. Genitourinary: Negative. Musculoskeletal: Negative. Negative for myalgias. Skin: Negative. Negative for rash. Neurological: Negative for dizziness, focal weakness, syncope, weakness, light-headedness, numbness and headaches.    Psychiatric/Behavioral: Negative.        :   /70 (Site: Right Upper Arm, Position: Sitting, Cuff Size: Medium Adult)   Pulse 72   Resp 12   Ht 5' 1\" (1.549 m)   Wt 136 lb 9.6 oz (62 kg)   BMI 25.81 kg/m²   Wt Readings from Last 3 Encounters:   07/13/22 136 lb 9.6 oz (62 kg)   04/04/22 141 lb (64 kg)   09/22/21 139 lb 6 oz (63.2 kg)     Physical Exam  Vitals and nursing note reviewed. Constitutional:       General: She is not in acute distress. Appearance: She is well-developed. She is not diaphoretic. HENT:      Head: Normocephalic and atraumatic. Eyes:      General: No scleral icterus. Right eye: No discharge. Left eye: No discharge. Conjunctiva/sclera: Conjunctivae normal.      Pupils: Pupils are equal, round, and reactive to light. Neck:      Thyroid: No thyromegaly. Vascular: No JVD. Cardiovascular:      Rate and Rhythm: Normal rate and regular rhythm. Heart sounds: Normal heart sounds. No murmur heard. Pulmonary:      Effort: No respiratory distress. Breath sounds: Normal breath sounds. No wheezing, rhonchi or rales. Abdominal:      General: Bowel sounds are normal. There is no distension. Palpations: Abdomen is soft. There is no mass. Tenderness: There is no abdominal tenderness. There is no guarding or rebound. Musculoskeletal:         General: Normal range of motion. Cervical back: Normal range of motion and neck supple. Lymphadenopathy:      Cervical: No cervical adenopathy. Skin:     General: Skin is warm and dry. Findings: No rash. Neurological:      Mental Status: She is alert and oriented to person, place, and time. Psychiatric:         Behavior: Behavior normal.       :       Diagnosis Orders   1. Essential hypertension     2. Hyperlipidemia, unspecified hyperlipidemia type     3. Vitamin D deficiency     4. Age-related osteoporosis without current pathological fracture     5.  Gastroesophageal reflux disease, unspecified whether esophagitis present         :      Requested Prescriptions      No prescriptions requested or ordered in this encounter     Current Outpatient Medications   Medication Sig Dispense Refill    atorvastatin (LIPITOR) 40 MG tablet TAKE 1 TABLET EVERY DAY 90 tablet 1    pantoprazole (PROTONIX) 40 MG tablet TAKE 1 TABLET EVERY DAY 90 tablet 1    losartan (COZAAR) 25 MG tablet TAKE 1 TABLET EVERY DAY 90 tablet 1    alendronate (FOSAMAX) 70 MG tablet TAKE 1 TABLET EVERY 7 DAYS 12 tablet 1    Cholecalciferol (VITAMIN D) 2000 units CAPS capsule Take by mouth      Calcium-Magnesium-Vitamin D 400-166.7-133.3 MG-MG-UNIT TABS Take 1 capsule by mouth daily      aspirin 81 MG EC tablet Take 81 mg by mouth daily. Last dose 7-1-2014 for surgery 7-9-2014       No current facility-administered medications for this visit. No orders of the defined types were placed in this encounter. Continue current medications. Return in about 4 months (around 11/13/2022) for HTN. Discussed use, benefit, and side effects of prescribed medications. All patient questions answered. Pt voiced understanding. Instructed to continue current medications,diet and exercise. Patient agreed with treatment plan.

## 2022-08-24 DIAGNOSIS — M81.0 AGE-RELATED OSTEOPOROSIS WITHOUT CURRENT PATHOLOGICAL FRACTURE: ICD-10-CM

## 2022-08-24 RX ORDER — ALENDRONATE SODIUM 70 MG/1
TABLET ORAL
Qty: 12 TABLET | Refills: 1 | Status: SHIPPED | OUTPATIENT
Start: 2022-08-24

## 2022-08-24 NOTE — TELEPHONE ENCOUNTER
Date of last visit:  7/13/2022  Date of next visit:  11/7/2022    Requested Prescriptions     Pending Prescriptions Disp Refills    alendronate (FOSAMAX) 70 MG tablet [Pharmacy Med Name: ALENDRONATE SODIUM 70 MG Tablet] 12 tablet 1     Sig: TAKE 1 TABLET EVERY 7 DAYS

## 2022-10-24 DIAGNOSIS — I10 ESSENTIAL HYPERTENSION: ICD-10-CM

## 2022-10-24 RX ORDER — LOSARTAN POTASSIUM 25 MG/1
TABLET ORAL
Qty: 90 TABLET | Refills: 0 | Status: SHIPPED | OUTPATIENT
Start: 2022-10-24

## 2022-10-24 NOTE — TELEPHONE ENCOUNTER
Date of last visit:  7/13/2022  Date of next visit:  11/7/2022    Requested Prescriptions     Pending Prescriptions Disp Refills    losartan (COZAAR) 25 MG tablet [Pharmacy Med Name: LOSARTAN POTASSIUM 25 MG Tablet] 90 tablet 1     Sig: TAKE 1 TABLET EVERY DAY

## 2022-10-28 DIAGNOSIS — E78.5 HYPERLIPIDEMIA, UNSPECIFIED HYPERLIPIDEMIA TYPE: ICD-10-CM

## 2022-10-28 RX ORDER — ATORVASTATIN CALCIUM 40 MG/1
TABLET, FILM COATED ORAL
Qty: 90 TABLET | Refills: 1 | Status: SHIPPED | OUTPATIENT
Start: 2022-10-28

## 2022-10-28 NOTE — TELEPHONE ENCOUNTER
The pharmacy is requesting a refill of the below medication which has been pended for you:     Requested Prescriptions     Pending Prescriptions Disp Refills    atorvastatin (LIPITOR) 40 MG tablet [Pharmacy Med Name: ATORVASTATIN CALCIUM 40 MG Tablet] 90 tablet 1     Sig: TAKE 1 TABLET EVERY DAY       Last Appointment Date: 7/13/2022  Next Appointment Date: 11/7/2022    Allergies   Allergen Reactions    Augmentin [Amoxicillin-Pot Clavulanate] Diarrhea    Biaxin [Clarithromycin] Itching

## 2022-11-01 DIAGNOSIS — K21.9 GASTROESOPHAGEAL REFLUX DISEASE, UNSPECIFIED WHETHER ESOPHAGITIS PRESENT: ICD-10-CM

## 2022-11-01 RX ORDER — PANTOPRAZOLE SODIUM 40 MG/1
TABLET, DELAYED RELEASE ORAL
Qty: 90 TABLET | Refills: 1 | Status: SHIPPED | OUTPATIENT
Start: 2022-11-01

## 2022-11-01 NOTE — TELEPHONE ENCOUNTER
The pharmacy is requesting a refill of the below medication which has been pended for you:     Requested Prescriptions     Pending Prescriptions Disp Refills    pantoprazole (PROTONIX) 40 MG tablet [Pharmacy Med Name: PANTOPRAZOLE SODIUM 40 MG Tablet Delayed Release] 90 tablet 1     Sig: TAKE 1 TABLET EVERY DAY       Last Appointment Date: 7/13/2022  Next Appointment Date: 11/7/2022    Allergies   Allergen Reactions    Augmentin [Amoxicillin-Pot Clavulanate] Diarrhea    Biaxin [Clarithromycin] Itching

## 2022-11-07 ENCOUNTER — OFFICE VISIT (OUTPATIENT)
Dept: FAMILY MEDICINE CLINIC | Age: 77
End: 2022-11-07

## 2022-11-07 VITALS
HEART RATE: 76 BPM | DIASTOLIC BLOOD PRESSURE: 84 MMHG | BODY MASS INDEX: 25.83 KG/M2 | HEIGHT: 61 IN | WEIGHT: 136.8 LBS | RESPIRATION RATE: 12 BRPM | SYSTOLIC BLOOD PRESSURE: 136 MMHG

## 2022-11-07 DIAGNOSIS — M81.0 AGE-RELATED OSTEOPOROSIS WITHOUT CURRENT PATHOLOGICAL FRACTURE: ICD-10-CM

## 2022-11-07 DIAGNOSIS — E55.9 VITAMIN D DEFICIENCY: ICD-10-CM

## 2022-11-07 DIAGNOSIS — H91.93 DECREASED HEARING OF BOTH EARS: ICD-10-CM

## 2022-11-07 DIAGNOSIS — I10 ESSENTIAL HYPERTENSION: Primary | ICD-10-CM

## 2022-11-07 DIAGNOSIS — E78.5 HYPERLIPIDEMIA, UNSPECIFIED HYPERLIPIDEMIA TYPE: ICD-10-CM

## 2022-11-07 DIAGNOSIS — N18.31 STAGE 3A CHRONIC KIDNEY DISEASE (HCC): ICD-10-CM

## 2022-11-07 DIAGNOSIS — K21.9 GASTROESOPHAGEAL REFLUX DISEASE, UNSPECIFIED WHETHER ESOPHAGITIS PRESENT: ICD-10-CM

## 2022-11-07 PROCEDURE — 1036F TOBACCO NON-USER: CPT | Performed by: FAMILY MEDICINE

## 2022-11-07 PROCEDURE — G0439 PPPS, SUBSEQ VISIT: HCPCS | Performed by: FAMILY MEDICINE

## 2022-11-07 PROCEDURE — 99213 OFFICE O/P EST LOW 20 MIN: CPT | Performed by: FAMILY MEDICINE

## 2022-11-07 PROCEDURE — G8427 DOCREV CUR MEDS BY ELIG CLIN: HCPCS | Performed by: FAMILY MEDICINE

## 2022-11-07 PROCEDURE — 1123F ACP DISCUSS/DSCN MKR DOCD: CPT | Performed by: FAMILY MEDICINE

## 2022-11-07 PROCEDURE — G8399 PT W/DXA RESULTS DOCUMENT: HCPCS | Performed by: FAMILY MEDICINE

## 2022-11-07 PROCEDURE — 1090F PRES/ABSN URINE INCON ASSESS: CPT | Performed by: FAMILY MEDICINE

## 2022-11-07 PROCEDURE — G8417 CALC BMI ABV UP PARAM F/U: HCPCS | Performed by: FAMILY MEDICINE

## 2022-11-07 ASSESSMENT — PATIENT HEALTH QUESTIONNAIRE - PHQ9
SUM OF ALL RESPONSES TO PHQ QUESTIONS 1-9: 2
SUM OF ALL RESPONSES TO PHQ QUESTIONS 1-9: 2
SUM OF ALL RESPONSES TO PHQ9 QUESTIONS 1 & 2: 2
SUM OF ALL RESPONSES TO PHQ QUESTIONS 1-9: 2
1. LITTLE INTEREST OR PLEASURE IN DOING THINGS: 1
2. FEELING DOWN, DEPRESSED OR HOPELESS: 1
SUM OF ALL RESPONSES TO PHQ QUESTIONS 1-9: 2

## 2022-11-07 ASSESSMENT — ENCOUNTER SYMPTOMS
VOMITING: 0
DIARRHEA: 0
NAUSEA: 0
CONSTIPATION: 0
SHORTNESS OF BREATH: 0
EYES NEGATIVE: 1
COUGH: 0
ABDOMINAL PAIN: 0
CHEST TIGHTNESS: 0

## 2022-11-07 ASSESSMENT — LIFESTYLE VARIABLES
HOW MANY STANDARD DRINKS CONTAINING ALCOHOL DO YOU HAVE ON A TYPICAL DAY: 1 OR 2
HOW OFTEN DO YOU HAVE A DRINK CONTAINING ALCOHOL: 2-4 TIMES A MONTH

## 2022-11-07 NOTE — PROGRESS NOTES
Medicare Annual Wellness Visit    Paco Bean is here for Medicare AWV, Gastroesophageal Reflux, Hypertension, and Hyperlipidemia    Assessment & Plan   Essential hypertension  -     Comprehensive Metabolic Panel; Future  -     CBC with Auto Differential; Future  Hyperlipidemia, unspecified hyperlipidemia type  -     Lipid Panel; Future  Gastroesophageal reflux disease, unspecified whether esophagitis present  Age-related osteoporosis without current pathological fracture  Vitamin D deficiency  -     Vitamin D 25 Hydroxy; Future  Stage 3a chronic kidney disease (HCC)  Decreased hearing of both ears  -     [place] COMPREHENSIVE HEARING TEST    Recommendations for Preventive Services Due: see orders and patient instructions/AVS.  Recommended screening schedule for the next 5-10 years is provided to the patient in written form: see Patient Instructions/AVS.     Return in about 4 months (around 3/7/2023), or if symptoms worsen or fail to improve. Subjective       Patient's complete Health Risk Assessment and screening values have been reviewed and are found in Flowsheets. The following problems were reviewed today and where indicated follow up appointments were made and/or referrals ordered. Positive Risk Factor Screenings with Interventions:    Fall Risk:  Do you feel unsteady or are you worried about falling? : (!) yes  2 or more falls in past year?: no  Fall with injury in past year?: no   Fall Risk Interventions:    Home safety tips provided  If she turns too quick she can loose her balance, she states that she needs to be careful.              General Health and ACP:  General  In general, how would you say your health is?: Very Good  In the past 7 days, have you experienced any of the following: New or Increased Pain, New or Increased Fatigue, Loneliness, Social Isolation, Stress or Anger?: (!) Yes  Select all that apply: (!) Stress  Do you get the social and emotional support that you need?: Yes  Do you have a Living Will?: Yes    Advance Directives       Power of [address] Will ACP-Advance Directive ACP-Power of     Not on File Not on File Not on File Not on File        General Health Risk Interventions:  Stress: She states that her middle daughter who was her rock moved to [place] to be closer to her son and she is upset about that. She states that she has a living will and will bring us a copy. Health Habits/Nutrition:  Physical Activity: Inactive    Days of Exercise per Week: 0 days    Minutes of Exercise per Session: 0 min     Have you lost any weight without trying in the past 3 months?: (!) Yes  Body mass index: (!) 25.85  Have you seen the dentist within the past year?: Yes  Health Habits/Nutrition Interventions:  She states that she lost some weight and she was not hungry and her appetite is better now and she is gaining her weight back. Hearing/Vision:  Do you or your family notice any trouble with your hearing that hasn't been managed with hearing aids?: (!) Yes  Do you have difficulty driving, watching TV, or doing any of your daily activities because of your eyesight?: No  Have you had an eye exam within the past year?: Yes  No results found. Hearing/Vision Interventions:  Hearing concerns:  audiology referral provided    Safety:  Do you have working smoke detectors?: (!) No  Do you have any tripping hazards - loose or unsecured carpets or rugs?: No  Do you have any tripping hazards - clutter in doorways, halls, or stairs?: No  Do you have either shower bars, grab bars, non-slip mats or non-slip surfaces in your shower or bathtub?: Yes  Do all of your stairways have a railing or banister?: Not Applicable  Do you always fasten your seatbelt when you are in a car?: Yes  Safety Interventions:  Home safety tips provided  She states that they have carbon monoxide detectors but not smoke detectors. Advised to get smoke detectors.             Objective   Vitals:    11/07/22 1006 BP: 136/84   Site: Right Upper Arm   Position: Sitting   Cuff Size: Medium Adult   Pulse: 76   Resp: 12   Weight: 136 lb 12.8 oz (62.1 kg)   Height: 5' 1\" (1.549 m)      Body mass index is 25.85 kg/m². Allergies   Allergen Reactions    Augmentin [Amoxicillin-Pot Clavulanate] Diarrhea    Biaxin [Clarithromycin] Itching     Prior to Visit Medications    Medication Sig Taking? Authorizing Provider   pantoprazole (PROTONIX) 40 MG tablet TAKE 1 TABLET EVERY DAY Yes Srinivasan Abbott MD   atorvastatin (LIPITOR) 40 MG tablet TAKE 1 TABLET EVERY DAY Yes Srinivasan Abbott MD   losartan (COZAAR) 25 MG tablet TAKE 1 TABLET EVERY DAY Yes Srinivasan Abbott MD   alendronate (FOSAMAX) 70 MG tablet TAKE 1 TABLET EVERY 7 DAYS Yes Srinivasan Abbott MD   Cholecalciferol (VITAMIN D) 2000 units CAPS capsule Take by mouth Yes Historical Provider, MD   Calcium-Magnesium-Vitamin D 400-166.7-133.3 MG-MG-UNIT TABS Take 1 capsule by mouth daily Yes Historical Provider, MD   aspirin 81 MG EC tablet Take 81 mg by mouth daily. Last dose 7-1-2014 for surgery 7-9-2014 Yes Historical Provider, MD Rhodes (Including outside providers/suppliers regularly involved in providing care):   Patient Care Team:  Srinivasan Abbott MD as PCP - General (Family Medicine)  Srinivasan Abbott MD as PCP - [address] [place] [address] Provider  Reginia Schwab, MD as Consulting Physician (Cardiology)     Reviewed and updated this visit:  Tobacco  Allergies  Meds  Problems  Med Hx  Surg Hx  Soc Hx  Fam Hx           Cardiovascular Disease Risk Counseling: Assessed the patient's risk to develop cardiovascular disease and reviewed main risk factors.    Reviewed steps to reduce disease risk including:   Quitting tobacco use, reducing amount smoked, or not starting the habit  Making healthy food choices  Being physically active and gradualy increasing activity levels   Reduce weight and determine a healthy BMI goal  Monitor blood pressure and treat if higher than calcium-magnesium-vitamin d, and aspirin. Allergies   Allergen Reactions    Augmentin [Amoxicillin-Pot Clavulanate] Diarrhea    Biaxin [Clarithromycin] Itching       Social History     Tobacco Use    Smoking status: Never    Smokeless tobacco: Never   Substance Use Topics    Alcohol use: Yes     Alcohol/week: 0.0 standard drinks     Comment: 1 to 2 times a month    Drug use: No       Past Medical History:   Diagnosis Date    Cancer ([address]     skin    Colon polyps     Diverticulosis 11/01/2010    GERD (gastroesophageal reflux disease) 5/3/2022    Hyperlipidemia     Hypertension     Miscarriage     x4    MR (mitral regurgitation)     Osteoarthritis 06/01/2011    right knee    Respiratory tract infection due to COVID-19 virus 12/16/2020    TR (tricuspid regurgitation)     Tubular adenoma of colon     Valvular disease        Past Surgical History:   Procedure Laterality Date    CARPAL TUNNEL RELEASE      right    COLONOSCOPY  11/10    DILATION AND CURETTAGE OF UTERUS      x4     PRE-MALIGNANT / BENIGN SKIN LESION EXCISION  7/9/2014    Nose    SKIN BIOPSY      SKIN CANCER EXCISION  July 2014    nose - Dr. Alexandria Brooks  12/08       Family History   Problem Relation Age of Onset    High Cholesterol Mother     Stroke Mother     Heart Disease Father     Breast Cancer Sister 68    Breast Cancer Paternal Aunt 48    Breast Cancer Maternal Cousin 52    Breast Cancer Paternal Cousin 64     Subjective:     Review of Systems   Constitutional:  Negative for activity change, appetite change, diaphoresis, fatigue and fever. HENT:  Positive for hearing loss and tinnitus. Eyes: Negative. Respiratory:  Negative for cough, chest tightness and shortness of breath. Cardiovascular:  Negative for chest pain, palpitations and leg swelling. Gastrointestinal:  Negative for abdominal pain, constipation, diarrhea, nausea and vomiting. Genitourinary: Negative.     Musculoskeletal: Negative. Negative for myalgias. Skin: Negative. Negative for rash. Neurological:  Negative for dizziness, focal weakness, syncope, weakness, light-headedness, numbness and headaches. Psychiatric/Behavioral: Negative.       :   /84 (Site: Right Upper Arm, Position: Sitting, Cuff Size: Medium Adult)   Pulse 76   Resp 12   Ht 5' 1\" (1.549 m)   Wt 136 lb 12.8 oz (62.1 kg)   BMI 25.85 kg/m²   Wt Readings from Last 3 Encounters:   11/07/22 136 lb 12.8 oz (62.1 kg)   07/13/22 136 lb 9.6 oz (62 kg)   04/04/22 141 lb (64 kg)     Physical Exam  Vitals and nursing note reviewed. Constitutional:       General: She is not in acute distress. Appearance: She is well-developed. She is not diaphoretic. HENT:      Head: Normocephalic and atraumatic. Eyes:      General: No scleral icterus. Right eye: No discharge. Left eye: No discharge. Conjunctiva/sclera: Conjunctivae normal.      Pupils: Pupils are equal, round, and reactive to light. Neck:      Thyroid: No thyromegaly. Vascular: No JVD. Cardiovascular:      Rate and Rhythm: Normal rate and regular rhythm. Heart sounds: Normal heart sounds. No murmur heard. Pulmonary:      Effort: No respiratory distress. Breath sounds: Normal breath sounds. No wheezing, rhonchi or rales. Abdominal:      General: Bowel sounds are normal. There is no distension. Palpations: Abdomen is soft. There is no mass. Tenderness: There is no abdominal tenderness. There is no guarding or rebound. Musculoskeletal:         General: Normal range of motion. Cervical back: Normal range of motion and neck supple. Lymphadenopathy:      Cervical: No cervical adenopathy. Skin:     General: Skin is warm and dry. Findings: No rash. Neurological:      Mental Status: She is alert and oriented to person, place, and time. Psychiatric:         Behavior: Behavior normal.     :       Diagnosis Orders   1.  Essential hypertension Comprehensive Metabolic Panel    CBC with Auto Differential      2. Hyperlipidemia, unspecified hyperlipidemia type  Lipid Panel      3. Gastroesophageal reflux disease, unspecified whether esophagitis present        4. Age-related osteoporosis without current pathological fracture        5. Vitamin D deficiency  Vitamin D 25 Hydroxy      6. Stage 3a chronic kidney disease ([address]        7. Decreased hearing of both ears  [place] COMPREHENSIVE HEARING TEST          :      Requested Prescriptions      No prescriptions requested or ordered in this encounter     Current Outpatient Medications   Medication Sig Dispense Refill    pantoprazole (PROTONIX) 40 MG tablet TAKE 1 TABLET EVERY DAY 90 tablet 1    atorvastatin (LIPITOR) 40 MG tablet TAKE 1 TABLET EVERY DAY 90 tablet 1    losartan (COZAAR) 25 MG tablet TAKE 1 TABLET EVERY DAY 90 tablet 0    alendronate (FOSAMAX) 70 MG tablet TAKE 1 TABLET EVERY 7 DAYS 12 tablet 1    Cholecalciferol (VITAMIN D) 2000 units CAPS capsule Take by mouth      Calcium-Magnesium-Vitamin D 400-166.7-133.3 MG-MG-UNIT TABS Take 1 capsule by mouth daily      aspirin 81 MG EC tablet Take 81 mg by mouth daily. Last dose 7-1-2014 for surgery 7-9-2014       No current facility-administered medications for this visit. Orders Placed This Encounter   Procedures    Comprehensive Metabolic Panel     Standing Status:   Future     Standing Expiration Date:   11/7/2023    CBC with Auto Differential     Standing Status:   Future     Standing Expiration Date:   11/7/2023    Lipid Panel     Standing Status:   Future     Standing Expiration Date:   11/7/2023     Order Specific Question:   Is Patient Fasting?/# of Hours     Answer:   yes 12 hours    Vitamin D 25 Hydroxy     Standing Status:   Future     Standing Expiration Date:   11/7/2023    [place] COMPREHENSIVE HEARING TEST     Continue current medications. Return in about 4 months (around 3/7/2023), or if symptoms worsen or fail to improve.      Discussed use, benefit, and side effects of prescribed medications. All patient questions answered. Pt voiced understanding. Instructed to continue current medications,diet and exercise. Patient agreed with treatment plan. Allergies, Problem List, Medications, Medical History, Family History, Surgical History and Tobacco History reviewed by provider.

## 2022-12-07 ENCOUNTER — OFFICE VISIT (OUTPATIENT)
Dept: CARDIOLOGY CLINIC | Age: 77
End: 2022-12-07
Payer: MEDICARE

## 2022-12-07 VITALS
SYSTOLIC BLOOD PRESSURE: 100 MMHG | RESPIRATION RATE: 18 BRPM | WEIGHT: 135 LBS | HEIGHT: 61 IN | BODY MASS INDEX: 25.49 KG/M2 | HEART RATE: 66 BPM | DIASTOLIC BLOOD PRESSURE: 77 MMHG

## 2022-12-07 DIAGNOSIS — I10 PRIMARY HYPERTENSION: Primary | ICD-10-CM

## 2022-12-07 DIAGNOSIS — E78.5 DYSLIPIDEMIA (HIGH LDL; LOW HDL): ICD-10-CM

## 2022-12-07 PROCEDURE — 93000 ELECTROCARDIOGRAM COMPLETE: CPT | Performed by: INTERNAL MEDICINE

## 2022-12-07 PROCEDURE — 3074F SYST BP LT 130 MM HG: CPT | Performed by: INTERNAL MEDICINE

## 2022-12-07 PROCEDURE — 1036F TOBACCO NON-USER: CPT | Performed by: INTERNAL MEDICINE

## 2022-12-07 PROCEDURE — 99213 OFFICE O/P EST LOW 20 MIN: CPT | Performed by: INTERNAL MEDICINE

## 2022-12-07 PROCEDURE — G8427 DOCREV CUR MEDS BY ELIG CLIN: HCPCS | Performed by: INTERNAL MEDICINE

## 2022-12-07 PROCEDURE — 1090F PRES/ABSN URINE INCON ASSESS: CPT | Performed by: INTERNAL MEDICINE

## 2022-12-07 PROCEDURE — G8417 CALC BMI ABV UP PARAM F/U: HCPCS | Performed by: INTERNAL MEDICINE

## 2022-12-07 PROCEDURE — G8399 PT W/DXA RESULTS DOCUMENT: HCPCS | Performed by: INTERNAL MEDICINE

## 2022-12-07 PROCEDURE — G8484 FLU IMMUNIZE NO ADMIN: HCPCS | Performed by: INTERNAL MEDICINE

## 2022-12-07 PROCEDURE — 1123F ACP DISCUSS/DSCN MKR DOCD: CPT | Performed by: INTERNAL MEDICINE

## 2022-12-07 PROCEDURE — 3078F DIAST BP <80 MM HG: CPT | Performed by: INTERNAL MEDICINE

## 2022-12-07 ASSESSMENT — ENCOUNTER SYMPTOMS
WHEEZING: 0
ANAL BLEEDING: 0
NAUSEA: 0
COLOR CHANGE: 0
VOMITING: 0
CHEST TIGHTNESS: 0
STRIDOR: 0
VOICE CHANGE: 0
APNEA: 0
COUGH: 0
ABDOMINAL PAIN: 0
ABDOMINAL DISTENTION: 0
SHORTNESS OF BREATH: 0
BLOOD IN STOOL: 0
CHOKING: 0
TROUBLE SWALLOWING: 0

## 2022-12-07 NOTE — PROGRESS NOTES
[address] [facility] [place] [address]  Dept: [phone]  Loc: [phone]     12/7/2022       Rosalind Bazan is here today for   Chief Complaint   Patient presents with    Follow-up           Referring Physician:  No ref. provider found     Patient Active Problem List   Diagnosis    Hypertension    Hyperlipidemia    MR (mitral regurgitation)    TR (tricuspid regurgitation)    Diverticulosis    Osteoarthritis    Colon polyps    Age-related osteoporosis without current pathological fracture    Osteoarthritis of right hip    Respiratory tract infection due to COVID-19 virus    GERD (gastroesophageal reflux disease)       Review of Systems   Constitutional:  Negative for activity change, appetite change, fatigue, fever and unexpected weight change. HENT:  Negative for congestion, trouble swallowing and voice change. Eyes:  Negative for visual disturbance. Respiratory:  Negative for apnea, cough, choking, chest tightness, shortness of breath, wheezing and stridor. Cardiovascular:  Negative for chest pain, palpitations and leg swelling. Gastrointestinal:  Negative for abdominal distention, abdominal pain, anal bleeding, blood in stool, nausea and vomiting. Endocrine: Negative for cold intolerance and heat intolerance. Genitourinary:  Negative for hematuria. Musculoskeletal:  Negative for arthralgias, gait problem, joint swelling and myalgias. Skin:  Negative for color change and rash. Allergic/Immunologic: Negative for environmental allergies and food allergies. Neurological:  Negative for dizziness, tremors, syncope, facial asymmetry, weakness, light-headedness, numbness and headaches. Hematological:  Does not bruise/bleed easily. Psychiatric/Behavioral:  Negative for agitation, behavioral problems and sleep disturbance.        Past Medical History:   Diagnosis Date    Cancer ([facility] [address]     skin Colon polyps     Diverticulosis 11/01/2010    GERD (gastroesophageal reflux disease) 5/3/2022    Hyperlipidemia     Hypertension     Miscarriage     x4    MR (mitral regurgitation)     Osteoarthritis 06/01/2011    right knee    Respiratory tract infection due to COVID-19 virus 12/16/2020    TR (tricuspid regurgitation)     Tubular adenoma of colon     Valvular disease        Allergies   Allergen Reactions    Augmentin [Amoxicillin-Pot Clavulanate] Diarrhea    Biaxin [Clarithromycin] Itching       Current Outpatient Medications   Medication Sig Dispense Refill    pantoprazole (PROTONIX) 40 MG tablet TAKE 1 TABLET EVERY DAY 90 tablet 1    atorvastatin (LIPITOR) 40 MG tablet TAKE 1 TABLET EVERY DAY 90 tablet 1    losartan (COZAAR) 25 MG tablet TAKE 1 TABLET EVERY DAY 90 tablet 0    Cholecalciferol (VITAMIN D) 2000 units CAPS capsule Take by mouth      Calcium-Magnesium-Vitamin D 400-166.7-133.3 MG-MG-UNIT TABS Take 1 capsule by mouth daily      aspirin 81 MG EC tablet Take 81 mg by mouth daily. Last dose 7-1-2014 for surgery 7-9-2014       No current facility-administered medications for this visit. Social History     Socioeconomic History    Marital status:      Spouse name: None    Number of children: None    Years of education: None    Highest education level: None   Tobacco Use    Smoking status: Never    Smokeless tobacco: Never   Substance and Sexual Activity    Alcohol use:  Yes     Alcohol/week: 0.0 standard drinks     Comment: 1 to 2 times a month    Drug use: No    Sexual activity: Yes     Partners: Male     Social Determinants of Health     Financial Resource Strain: Low Risk     Difficulty of Paying Living Expenses: Not hard at all   Food Insecurity: No Food Insecurity    Worried About [id number] in the Last Year: Never true    Ran Out of Food in the Last Year: Never true   Physical Activity: Inactive    Days of Exercise per Week: 0 days    Minutes of Exercise per Session: 0 min Family History   Problem Relation Age of Onset    High Cholesterol Mother     Stroke Mother     Heart Disease Father     Breast Cancer Sister 68    Breast Cancer Paternal Aunt 48    Breast Cancer Maternal Cousin 52    Breast Cancer Paternal Cousin 64       Blood pressure 100/77, pulse 66, resp. rate 18, height 5' 1\" (1.549 m), weight 135 lb (61.2 kg), not currently breastfeeding. Physical Exam:    General Appearance: alert and oriented to person, place and time, in no acute distress  Cardiovascular: normal rate, regular rhythm, normal S1 and S2, no murmurs, rubs, clicks, or gallops, distal pulses intact, no carotid bruits, no JVD  Pulmonary/Chest: clear to auscultation bilaterally- no wheezes, rales or rhonchi, normal air movement, no respiratory distress  Abdomen: soft, non-tender, non-distended, normal bowel sounds, no masses   Extremities: no cyanosis, clubbing or edema, pulse   Skin: warm and dry, no rash or erythema  Head: normocephalic and atraumatic  Eyes: pupils equal, round, and reactive to light  Neck: supple and non-tender without mass, no thyromegaly   Musculoskeletal: normal range of motion, no joint swelling, deformity or tenderness  Neurological: alert, oriented, normal speech, no focal findings or movement disorder noted    Lab Data:    Cardiac Enzymes:  No results for input(s): CKTOTAL, CKMB, CKMBINDEX, TROPONINI in the last 72 hours.     CBC:   Lab Results   Component Value Date/Time    WBC 6.2 12/20/2021 09:20 AM    RBC 4.03 12/20/2021 09:20 AM    HGB 11.9 12/20/2021 09:20 AM    HCT 36.7 12/20/2021 09:20 AM     12/20/2021 09:20 AM       CMP:    Lab Results   Component Value Date/Time     07/12/2022 09:10 AM    K 4.1 07/12/2022 09:10 AM    CL 99 07/12/2022 09:10 AM    CO2 25 07/12/2022 09:10 AM    BUN 14 07/12/2022 09:10 AM    CREATININE 1.11 07/12/2022 09:10 AM    GLUCOSE 103 07/12/2022 09:10 AM    CALCIUM 9.1 07/12/2022 09:10 AM       Hepatic Function Panel:    Lab Results Component Value Date/Time    ALKPHOS 101 07/12/2022 09:10 AM    ALT 10 07/12/2022 09:10 AM    AST 15 07/12/2022 09:10 AM    PROT 6.4 07/12/2022 09:10 AM    BILITOT 0.6 07/12/2022 09:10 AM    LABALBU 4.3 07/12/2022 09:10 AM       Magnesium:  No results found for: MG    PT/INR:  No results found for: PROTIME, INR    HgBA1c:    Lab Results   Component Value Date/Time    LABA1C 6.1 12/17/2015 08:50 AM       FLP:    Lab Results   Component Value Date/Time    TRIG 109 07/12/2022 09:10 AM    HDL 60 07/12/2022 09:10 AM    LDLCALC 99 07/12/2022 09:10 AM    LDLDIRECT 97 12/16/2016 09:20 AM    LABVLDL 22 07/12/2022 09:10 AM       TSH:    Lab Results   Component Value Date/Time    TSH 2.10 05/07/2020 09:35 AM        Diagnosis Orders   1. Primary hypertension  74091 - [place] ELECTROCARDIOGRAM, COMPLETE    CBC    Basic Metabolic Panel    Lipid Panel    Hepatic Function Panel      2. Dyslipidemia (high LDL; low HDL)  CBC    Basic Metabolic Panel    Lipid Panel    Hepatic Function Panel           Assessment/Plan    Lakshmi Archibald is a 76years old lady history of an atypical chest pain in the past she had a stress test was okay she had a history of palpitation no recurrence of her symptoms history of hypercholesterolemia she has been on statin she has history of hypertension has been under better control. The patient indicates she is feeling well she denies chest pain she has been physically active.   The EKG shows sinus rhythm right bundle branch block with no change in medication were reconciled sent to her pharmacy lab work discussed with her the plan of treatment were discussed with her she was given the option to come in a year or as needed she would like to come in a yearly basis she will be seen in a year with the lab work seek medical attention should any change clinical condition thank    Orders Placed This Encounter   Procedures    CBC     Standing Status:   Future     Standing Expiration Date:   16/9/9986    Basic Metabolic Panel     Standing Status:   Future     Standing Expiration Date:   12/7/2023    Lipid Panel     Standing Status:   Future     Standing Expiration Date:   12/7/2023     Order Specific Question:   Is Patient Fasting?/# of Hours     Answer:   12 hours    Hepatic Function Panel     Standing Status:   Future     Standing Expiration Date:   12/7/2023    38066 - [place] ELECTROCARDIOGRAM, COMPLETE       Return in about 1 year (around 12/7/2023) for sob.      Ally Contreras MD

## 2023-01-18 ENCOUNTER — OFFICE VISIT (OUTPATIENT)
Dept: FAMILY MEDICINE CLINIC | Age: 78
End: 2023-01-18

## 2023-01-18 VITALS
RESPIRATION RATE: 16 BRPM | HEIGHT: 61 IN | WEIGHT: 135.8 LBS | DIASTOLIC BLOOD PRESSURE: 84 MMHG | BODY MASS INDEX: 25.64 KG/M2 | SYSTOLIC BLOOD PRESSURE: 136 MMHG | HEART RATE: 80 BPM

## 2023-01-18 DIAGNOSIS — I10 ESSENTIAL HYPERTENSION: ICD-10-CM

## 2023-01-18 DIAGNOSIS — N39.0 URINARY TRACT INFECTION WITHOUT HEMATURIA, SITE UNSPECIFIED: Primary | ICD-10-CM

## 2023-01-18 PROCEDURE — G8427 DOCREV CUR MEDS BY ELIG CLIN: HCPCS | Performed by: FAMILY MEDICINE

## 2023-01-18 PROCEDURE — 1036F TOBACCO NON-USER: CPT | Performed by: FAMILY MEDICINE

## 2023-01-18 PROCEDURE — 1123F ACP DISCUSS/DSCN MKR DOCD: CPT | Performed by: FAMILY MEDICINE

## 2023-01-18 PROCEDURE — 1090F PRES/ABSN URINE INCON ASSESS: CPT | Performed by: FAMILY MEDICINE

## 2023-01-18 PROCEDURE — G8417 CALC BMI ABV UP PARAM F/U: HCPCS | Performed by: FAMILY MEDICINE

## 2023-01-18 PROCEDURE — 99213 OFFICE O/P EST LOW 20 MIN: CPT | Performed by: FAMILY MEDICINE

## 2023-01-18 PROCEDURE — G8399 PT W/DXA RESULTS DOCUMENT: HCPCS | Performed by: FAMILY MEDICINE

## 2023-01-18 RX ORDER — NITROFURANTOIN 25; 75 MG/1; MG/1
100 CAPSULE ORAL 2 TIMES DAILY
Qty: 14 CAPSULE | Refills: 0 | Status: SHIPPED | OUTPATIENT
Start: 2023-01-18 | End: 2023-01-25

## 2023-01-18 ASSESSMENT — PATIENT HEALTH QUESTIONNAIRE - PHQ9
SUM OF ALL RESPONSES TO PHQ9 QUESTIONS 1 & 2: 0
10. IF YOU CHECKED OFF ANY PROBLEMS, HOW DIFFICULT HAVE THESE PROBLEMS MADE IT FOR YOU TO DO YOUR WORK, TAKE CARE OF THINGS AT HOME, OR GET ALONG WITH OTHER PEOPLE: 0
1. LITTLE INTEREST OR PLEASURE IN DOING THINGS: 0
6. FEELING BAD ABOUT YOURSELF - OR THAT YOU ARE A FAILURE OR HAVE LET YOURSELF OR YOUR FAMILY DOWN: 0
4. FEELING TIRED OR HAVING LITTLE ENERGY: 0
SUM OF ALL RESPONSES TO PHQ QUESTIONS 1-9: 0
8. MOVING OR SPEAKING SO SLOWLY THAT OTHER PEOPLE COULD HAVE NOTICED. OR THE OPPOSITE, BEING SO FIGETY OR RESTLESS THAT YOU HAVE BEEN MOVING AROUND A LOT MORE THAN USUAL: 0
SUM OF ALL RESPONSES TO PHQ QUESTIONS 1-9: 0
SUM OF ALL RESPONSES TO PHQ QUESTIONS 1-9: 0
9. THOUGHTS THAT YOU WOULD BE BETTER OFF DEAD, OR OF HURTING YOURSELF: 0
SUM OF ALL RESPONSES TO PHQ QUESTIONS 1-9: 0
3. TROUBLE FALLING OR STAYING ASLEEP: 0
2. FEELING DOWN, DEPRESSED OR HOPELESS: 0
7. TROUBLE CONCENTRATING ON THINGS, SUCH AS READING THE NEWSPAPER OR WATCHING TELEVISION: 0
5. POOR APPETITE OR OVEREATING: 0

## 2023-01-18 ASSESSMENT — ENCOUNTER SYMPTOMS
DIARRHEA: 0
ABDOMINAL PAIN: 0
EYES NEGATIVE: 1
VOMITING: 0
COUGH: 0
NAUSEA: 0
CONSTIPATION: 0
SHORTNESS OF BREATH: 0
CHEST TIGHTNESS: 0

## 2023-01-18 NOTE — PROGRESS NOTES
Date: 1/18/2023    Silas Armstrong is a 76 y.o. female who presents today for:  Chief Complaint   Patient presents with    Urinary Urgency, frequency, and dysuria on and off for a week now. HPI:     HPI    has a current medication list which includes the following prescription(s): nitrofurantoin (macrocrystal-monohydrate), pantoprazole, atorvastatin, losartan, vitamin d, calcium-magnesium-vitamin d, and aspirin. Allergies   Allergen Reactions    Augmentin [Amoxicillin-Pot Clavulanate] Diarrhea    Biaxin [Clarithromycin] Itching       Social History     Tobacco Use    Smoking status: Never    Smokeless tobacco: Never   Substance Use Topics    Alcohol use:  Yes     Alcohol/week: 0.0 standard drinks     Comment: 1 to 2 times a month    Drug use: No       Past Medical History:   Diagnosis Date    Cancer ([facility] [address]     skin    Colon polyps     Diverticulosis 11/01/2010    GERD (gastroesophageal reflux disease) 5/3/2022    Hyperlipidemia     Hypertension     Miscarriage     x4    MR (mitral regurgitation)     Osteoarthritis 06/01/2011    right knee    Respiratory tract infection due to COVID-19 virus 12/16/2020    TR (tricuspid regurgitation)     Tubular adenoma of colon     Valvular disease        Past Surgical History:   Procedure Laterality Date    CARPAL TUNNEL RELEASE      right    COLONOSCOPY  11/10    DILATION AND CURETTAGE OF UTERUS      x4     PRE-MALIGNANT / BENIGN SKIN LESION EXCISION  7/9/2014    Nose    SKIN BIOPSY      SKIN CANCER EXCISION  July 2014    nose - Dr. Jada Nicholas  12/08       Family History   Problem Relation Age of Onset    High Cholesterol Mother     Stroke Mother     Heart Disease Father     Breast Cancer Sister 68    Breast Cancer Paternal Aunt 48    Breast Cancer Maternal Cousin 52    Breast Cancer Paternal Cousin 64     Subjective:     Review of Systems   Constitutional:  Negative for activity change, appetite change, chills, diaphoresis, fatigue and fever. HENT: Negative. Eyes: Negative. Respiratory:  Negative for cough, chest tightness and shortness of breath. Cardiovascular:  Negative for chest pain, palpitations and leg swelling. Gastrointestinal:  Negative for abdominal pain, constipation, diarrhea, nausea and vomiting. Genitourinary:  Positive for dysuria, frequency and urgency. Negative for flank pain and pelvic pain. Musculoskeletal: Negative. Skin: Negative. Negative for rash. Neurological:  Negative for dizziness, syncope, weakness, light-headedness, numbness and headaches. Psychiatric/Behavioral: Negative.       :   /84 (Site: Right Upper Arm, Position: Sitting, Cuff Size: Medium Adult)   Pulse 80   Resp 16   Ht 5' 1\" (1.549 m)   Wt 135 lb 12.8 oz (61.6 kg)   BMI 25.66 kg/m²   Wt Readings from Last 3 Encounters:   01/18/23 135 lb 12.8 oz (61.6 kg)   12/07/22 135 lb (61.2 kg)   11/07/22 136 lb 12.8 oz (62.1 kg)     Physical Exam  Vitals and nursing note reviewed. Constitutional:       General: She is not in acute distress. Appearance: She is well-developed. She is not diaphoretic. HENT:      Head: Normocephalic and atraumatic. Eyes:      General: No scleral icterus. Right eye: No discharge. Left eye: No discharge. Conjunctiva/sclera: Conjunctivae normal.      Pupils: Pupils are equal, round, and reactive to light. Neck:      Thyroid: No thyromegaly. Vascular: No JVD. Cardiovascular:      Rate and Rhythm: Normal rate and regular rhythm. Heart sounds: Normal heart sounds. No murmur heard. Pulmonary:      Effort: No respiratory distress. Breath sounds: Normal breath sounds. No wheezing, rhonchi or rales. Abdominal:      General: Bowel sounds are normal. There is no distension. Palpations: Abdomen is soft. There is no mass. Tenderness: There is no abdominal tenderness. There is no guarding or rebound.    Musculoskeletal: General: Normal range of motion. Cervical back: Normal range of motion and neck supple. Lymphadenopathy:      Cervical: No cervical adenopathy. Skin:     General: Skin is warm and dry. Findings: No rash. Neurological:      Mental Status: She is alert and oriented to person, place, and time. Psychiatric:         Behavior: Behavior normal.     :       Diagnosis Orders   1. Urinary tract infection without hematuria, site unspecified  Urinalysis with Reflex to Culture    nitrofurantoin, macrocrystal-monohydrate, (MACROBID) 100 MG capsule      2. Essential hypertension            :      Requested Prescriptions     Signed Prescriptions Disp Refills    nitrofurantoin, macrocrystal-monohydrate, (MACROBID) 100 MG capsule 14 capsule 0     Sig: Take 1 capsule by mouth 2 times daily for 7 days     Current Outpatient Medications   Medication Sig Dispense Refill    nitrofurantoin, macrocrystal-monohydrate, (MACROBID) 100 MG capsule Take 1 capsule by mouth 2 times daily for 7 days 14 capsule 0    pantoprazole (PROTONIX) 40 MG tablet TAKE 1 TABLET EVERY DAY 90 tablet 1    atorvastatin (LIPITOR) 40 MG tablet TAKE 1 TABLET EVERY DAY 90 tablet 1    losartan (COZAAR) 25 MG tablet TAKE 1 TABLET EVERY DAY 90 tablet 0    Cholecalciferol (VITAMIN D) 2000 units CAPS capsule Take by mouth      Calcium-Magnesium-Vitamin D 400-166.7-133.3 MG-MG-UNIT TABS Take 1 capsule by mouth daily      aspirin 81 MG EC tablet Take 81 mg by mouth daily. Last dose 7-1-2014 for surgery 7-9-2014       No current facility-administered medications for this visit. Orders Placed This Encounter   Procedures    Urinalysis with Reflex to Culture     Standing Status:   Future     Standing Expiration Date:   1/18/2024     Order Specific Question:   SPECIFY(EX-CATH,MIDSTREAM,CYSTO,ETC)? Answer:   midstream     Continue current medications. Return in about 4 months (around 5/18/2023), or if symptoms worsen or fail to improve, for HTN. Discussed use, benefit, and side effects of prescribed medications. All patient questions answered. Pt voiced understanding. Instructed to continue current medications,diet and exercise. Patient agreed with treatment plan. Allergies, Problem List, Medications, Medical History, Family History, Surgical History and Tobacco History reviewed by provider.

## 2023-01-19 LAB
APPEARANCE: CLEAR
BACTERIA: ABNORMAL PER HPF
BACTERIA: ABNORMAL PER HPF
BILIRUBIN: NEGATIVE
COLOR: YELLOW
EPITHELIAL CELLS: ABNORMAL PER HPF (ref 0–10)
EPITHELIAL CELLS: ABNORMAL PER HPF (ref 0–10)
GLUCOSE BLD-MCNC: NEGATIVE MG/DL
HYALINE CASTS: ABNORMAL
HYALINE CASTS: ABNORMAL
KETONES, URINE: NEGATIVE
LEUKOCYTE ESTERASE, URINE: ABNORMAL
NITRITE, URINE: NEGATIVE
OCCULT BLOOD,URINE: ABNORMAL
PH: 7 (ref 5–9)
PROTEIN, URINE: NEGATIVE
RBC: ABNORMAL PER HPF (ref 0–5)
RBC: ABNORMAL PER HPF (ref 0–5)
SP GRAVITY MISCELLANEOUS: 1.01 (ref 1–1.03)
UROBILINOGEN, URINE: 0.2
WBC: >50 PER HPF (ref 0–5)
WBC: >50 PER HPF (ref 0–5)

## 2023-01-21 LAB — URINE CULTURE, ROUTINE: NORMAL

## 2023-03-01 ENCOUNTER — OFFICE VISIT (OUTPATIENT)
Dept: FAMILY MEDICINE CLINIC | Age: 78
End: 2023-03-01

## 2023-03-01 ENCOUNTER — TELEPHONE (OUTPATIENT)
Dept: ADMINISTRATIVE | Facility: CLINIC | Age: 78
End: 2023-03-01

## 2023-03-01 VITALS
RESPIRATION RATE: 16 BRPM | DIASTOLIC BLOOD PRESSURE: 84 MMHG | HEIGHT: 61 IN | HEART RATE: 80 BPM | WEIGHT: 137 LBS | BODY MASS INDEX: 25.86 KG/M2 | SYSTOLIC BLOOD PRESSURE: 132 MMHG

## 2023-03-01 DIAGNOSIS — H92.01 ACUTE EAR PAIN, RIGHT: ICD-10-CM

## 2023-03-01 DIAGNOSIS — H93.13 TINNITUS OF BOTH EARS: Primary | ICD-10-CM

## 2023-03-01 DIAGNOSIS — I10 ESSENTIAL HYPERTENSION: ICD-10-CM

## 2023-03-01 DIAGNOSIS — H91.91 DECREASED HEARING, RIGHT: ICD-10-CM

## 2023-03-01 PROCEDURE — 1123F ACP DISCUSS/DSCN MKR DOCD: CPT | Performed by: FAMILY MEDICINE

## 2023-03-01 PROCEDURE — G8399 PT W/DXA RESULTS DOCUMENT: HCPCS | Performed by: FAMILY MEDICINE

## 2023-03-01 PROCEDURE — 1036F TOBACCO NON-USER: CPT | Performed by: FAMILY MEDICINE

## 2023-03-01 PROCEDURE — G8427 DOCREV CUR MEDS BY ELIG CLIN: HCPCS | Performed by: FAMILY MEDICINE

## 2023-03-01 PROCEDURE — 1090F PRES/ABSN URINE INCON ASSESS: CPT | Performed by: FAMILY MEDICINE

## 2023-03-01 PROCEDURE — G8417 CALC BMI ABV UP PARAM F/U: HCPCS | Performed by: FAMILY MEDICINE

## 2023-03-01 PROCEDURE — 99214 OFFICE O/P EST MOD 30 MIN: CPT | Performed by: FAMILY MEDICINE

## 2023-03-01 RX ORDER — CIPROFLOXACIN AND DEXAMETHASONE 3; 1 MG/ML; MG/ML
4 SUSPENSION/ DROPS AURICULAR (OTIC) 2 TIMES DAILY
Qty: 7.5 ML | Refills: 0 | Status: SHIPPED | OUTPATIENT
Start: 2023-03-01 | End: 2023-03-08

## 2023-03-01 RX ORDER — PREDNISONE 10 MG/1
TABLET ORAL
Qty: 16 TABLET | Refills: 0 | Status: SHIPPED | OUTPATIENT
Start: 2023-03-01

## 2023-03-01 SDOH — ECONOMIC STABILITY: HOUSING INSECURITY
IN THE LAST 12 MONTHS, WAS THERE A TIME WHEN YOU DID NOT HAVE A STEADY PLACE TO SLEEP OR SLEPT IN A SHELTER (INCLUDING NOW)?: NO

## 2023-03-01 SDOH — ECONOMIC STABILITY: INCOME INSECURITY: HOW HARD IS IT FOR YOU TO PAY FOR THE VERY BASICS LIKE FOOD, HOUSING, MEDICAL CARE, AND HEATING?: NOT HARD AT ALL

## 2023-03-01 SDOH — ECONOMIC STABILITY: FOOD INSECURITY: WITHIN THE PAST 12 MONTHS, YOU WORRIED THAT YOUR FOOD WOULD RUN OUT BEFORE YOU GOT MONEY TO BUY MORE.: NEVER TRUE

## 2023-03-01 SDOH — ECONOMIC STABILITY: FOOD INSECURITY: WITHIN THE PAST 12 MONTHS, THE FOOD YOU BOUGHT JUST DIDN'T LAST AND YOU DIDN'T HAVE MONEY TO GET MORE.: NEVER TRUE

## 2023-03-01 ASSESSMENT — ENCOUNTER SYMPTOMS
EYE DISCHARGE: 1
CHEST TIGHTNESS: 0
SORE THROAT: 0
NAUSEA: 0
SINUS PRESSURE: 0
SINUS PAIN: 0
CONSTIPATION: 0
SHORTNESS OF BREATH: 0
VOMITING: 0
COUGH: 0
DIARRHEA: 0
RHINORRHEA: 1
ABDOMINAL PAIN: 0

## 2023-03-01 NOTE — PROGRESS NOTES
Date: 3/1/2023    Clayton Bullard is a [de-identified] y.o. female who presents today for:  Chief Complaint   Patient presents with    Gastroesophageal Reflux    Hyperlipidemia    Hypertension    Ear Fullness for a week now since last week on Wednesday. Tinnitus bilaterally. She stated that she has a history of tinnitus stopped from her left side for over 20 years and she is used to it. Now she has it on the right ear and she stated that she has to squishing sound that is really upsetting her. Nasal Congestion  She just came back from [place] last night. She went to a Clinic in [place] and was given a steroid \"shot\"       HPI:     HPI    has a current medication list which includes the following prescription(s): ciprofloxacin-dexamethasone, prednisone, pantoprazole, atorvastatin, losartan, vitamin d, calcium-magnesium-vitamin d, and aspirin. Allergies   Allergen Reactions    Augmentin [Amoxicillin-Pot Clavulanate] Diarrhea    Biaxin [Clarithromycin] Itching       Social History     Tobacco Use    Smoking status: Never    Smokeless tobacco: Never   Substance Use Topics    Alcohol use:  Yes     Alcohol/week: 0.0 standard drinks     Comment: 1 to 2 times a month    Drug use: No       Past Medical History:   Diagnosis Date    Cancer ([facility] [address]     skin    Colon polyps     Diverticulosis 11/01/2010    GERD (gastroesophageal reflux disease) 5/3/2022    Hyperlipidemia     Hypertension     Miscarriage     x4    MR (mitral regurgitation)     Osteoarthritis 06/01/2011    right knee    Respiratory tract infection due to COVID-19 virus 12/16/2020    TR (tricuspid regurgitation)     Tubular adenoma of colon     Valvular disease        Past Surgical History:   Procedure Laterality Date    CARPAL TUNNEL RELEASE      right    COLONOSCOPY  11/10    DILATION AND CURETTAGE OF UTERUS      x4     PRE-MALIGNANT / BENIGN SKIN LESION EXCISION  7/9/2014    Nose    SKIN BIOPSY      SKIN CANCER EXCISION  July 2014    nose - Dr. Marlen Salgado LIGATION      UPPER GASTROINTESTINAL ENDOSCOPY  12/08       Family History   Problem Relation Age of Onset    High Cholesterol Mother     Stroke Mother     Heart Disease Father     Breast Cancer Sister 68    Breast Cancer Paternal Aunt 48    Breast Cancer Maternal Cousin 52    Breast Cancer Paternal Cousin 64     Subjective:     Review of Systems   Constitutional:  Negative for activity change, appetite change, chills, diaphoresis, fatigue and fever. HENT:  Positive for congestion, ear pain (right one), hearing loss (on her right side), rhinorrhea and tinnitus (bilaterally). Negative for sinus pressure, sinus pain and sore throat. Eyes:  Positive for discharge (crusty and matted in the AM.). Respiratory:  Negative for cough, chest tightness and shortness of breath. Cardiovascular:  Negative for chest pain, palpitations and leg swelling. Gastrointestinal:  Negative for abdominal pain, constipation, diarrhea, nausea and vomiting. Genitourinary: Negative. Musculoskeletal: Negative. Skin: Negative. Negative for rash. Neurological:  Negative for dizziness, syncope, weakness, light-headedness, numbness and headaches. Psychiatric/Behavioral:  Negative for suicidal ideas. The patient is nervous/anxious. She stated that due to some problem with her ear that is really bothering her, and is very upsetting to her. She stated that if this didn't get better she could kill someone or do something. This was further addressed she stated that that was joking that she does not mean to hurt anybody or hurt herself, she has too much to look for with her family.   She stated that she has no intention of hurting herself or anybody else.     :   /84 (Site: Left Upper Arm, Position: Sitting, Cuff Size: Medium Adult)   Pulse 80   Resp 16   Ht 5' 1\" (1.549 m)   Wt 137 lb (62.1 kg)   BMI 25.89 kg/m²   Wt Readings from Last 3 Encounters:   03/01/23 137 lb (62.1 kg)   01/18/23 135 lb 12.8 oz (61.6 kg) 12/07/22 135 lb (61.2 kg)     Physical Exam  Vitals and nursing note reviewed. Constitutional:       General: She is not in acute distress. Appearance: She is well-developed. She is not diaphoretic. HENT:      Head: Normocephalic and atraumatic. Right Ear: External ear normal. Decreased hearing noted. Drainage (she has wax occluding the ear canal.) present. No mastoid tenderness. Left Ear: Tympanic membrane, ear canal and external ear normal.      Ears:      Comments: She was not able to hear finger rubs on her right side. Eyes:      General: No scleral icterus. Right eye: No discharge. Left eye: No discharge. Conjunctiva/sclera: Conjunctivae normal.      Pupils: Pupils are equal, round, and reactive to light. Neck:      Thyroid: No thyromegaly. Vascular: No JVD. Cardiovascular:      Rate and Rhythm: Normal rate and regular rhythm. Heart sounds: Normal heart sounds. No murmur heard. Pulmonary:      Effort: No respiratory distress. Breath sounds: Normal breath sounds. No wheezing, rhonchi or rales. Abdominal:      General: Bowel sounds are normal. There is no distension. Palpations: Abdomen is soft. There is no mass. Tenderness: There is no abdominal tenderness. There is no guarding or rebound. Musculoskeletal:         General: Normal range of motion. Cervical back: Normal range of motion and neck supple. Lymphadenopathy:      Cervical: No cervical adenopathy. Skin:     General: Skin is warm and dry. Findings: No rash. Neurological:      Mental Status: She is alert and oriented to person, place, and time. Psychiatric:         Behavior: Behavior normal.     :       Diagnosis Orders   1. Tinnitus of both Kasia Kenney MD, [facility]      2. Decreased hearing, right  ciprofloxacin-dexamethasone (CIPRODEX) 0.3-0.1 % otic suspension    predniSONE (DELTASONE) 10 MG tablet      3.  Acute ear pain, right ciprofloxacin-dexamethasone (CIPRODEX) 0.3-0.1 % otic suspension      4. Essential hypertension            :      Requested Prescriptions     Signed Prescriptions Disp Refills    ciprofloxacin-dexamethasone (CIPRODEX) 0.3-0.1 % otic suspension 7.5 mL 0     Sig: Place 4 drops into the right ear 2 times daily for 7 days    predniSONE (DELTASONE) 10 MG tablet 16 tablet 0     Si tablets 2 times a day for 2 days, then 1 Tablet 2 times a day for 2 days, then 1 tablet daily till gone     Current Outpatient Medications   Medication Sig Dispense Refill    ciprofloxacin-dexamethasone (CIPRODEX) 0.3-0.1 % otic suspension Place 4 drops into the right ear 2 times daily for 7 days 7.5 mL 0    predniSONE (DELTASONE) 10 MG tablet 2 tablets 2 times a day for 2 days, then 1 Tablet 2 times a day for 2 days, then 1 tablet daily till gone 16 tablet 0    pantoprazole (PROTONIX) 40 MG tablet TAKE 1 TABLET EVERY DAY 90 tablet 1    atorvastatin (LIPITOR) 40 MG tablet TAKE 1 TABLET EVERY DAY 90 tablet 1    losartan (COZAAR) 25 MG tablet TAKE 1 TABLET EVERY DAY 90 tablet 0    Cholecalciferol (VITAMIN D) 2000 units CAPS capsule Take by mouth      Calcium-Magnesium-Vitamin D 400-166.7-133.3 MG-MG-UNIT TABS Take 1 capsule by mouth daily      aspirin 81 MG EC tablet Take 81 mg by mouth daily. Last dose 2014 for surgery 2014       No current facility-administered medications for this visit. Orders Placed This Encounter   Procedures    Wally Piper MD, Otolaryngology, [facility]     Referral Priority:   Routine     Referral Type:   Eval and Treat     Referral Reason:   Specialty Services Required     Referred to Provider:   Chuck Mahajan MD     Requested Specialty:   Otolaryngology     Number of Visits Requested:   1     Continue current medications. Return in about 1 week (around 3/8/2023), or if symptoms worsen or fail to improve. Discussed use, benefit, and side effects of prescribed medications.   All patient questions answered. Pt voiced understanding. Instructed to continue current medications,diet and exercise. Patient agreed with treatment plan. Allergies, Problem List, Medications, Medical History, Family History, Surgical History and Tobacco History reviewed by provider.

## 2023-03-07 NOTE — PROGRESS NOTES
Date: 3/8/2023    Ariella Francisco is a [de-identified] y.o. female who presents today for:  Chief Complaint   Patient presents with    Tinnitus       1 week check she states that her hearing is better and the ringing is also better. She states that it feels like she has to pop her ears. HPI:     HPI    has a current medication list which includes the following prescription(s): ciprofloxacin-dexamethasone, prednisone, pantoprazole, atorvastatin, losartan, vitamin d, calcium-magnesium-vitamin d, and aspirin. Allergies   Allergen Reactions    Augmentin [Amoxicillin-Pot Clavulanate] Diarrhea    Biaxin [Clarithromycin] Itching       Social History     Tobacco Use    Smoking status: Never    Smokeless tobacco: Never   Substance Use Topics    Alcohol use:  Yes     Alcohol/week: 0.0 standard drinks     Comment: 1 to 2 times a month    Drug use: No       Past Medical History:   Diagnosis Date    Cancer ([address]     skin    Colon polyps     Diverticulosis 11/01/2010    GERD (gastroesophageal reflux disease) 5/3/2022    Hyperlipidemia     Hypertension     Miscarriage     x4    MR (mitral regurgitation)     Osteoarthritis 06/01/2011    right knee    Respiratory tract infection due to COVID-19 virus 12/16/2020    TR (tricuspid regurgitation)     Tubular adenoma of colon     Valvular disease        Past Surgical History:   Procedure Laterality Date    CARPAL TUNNEL RELEASE      right    COLONOSCOPY  11/10    DILATION AND CURETTAGE OF UTERUS      x4     PRE-MALIGNANT / BENIGN SKIN LESION EXCISION  7/9/2014    Nose    SKIN BIOPSY      SKIN CANCER EXCISION  July 2014    nose - Dr. Ovi Patel  12/08       Family History   Problem Relation Age of Onset    High Cholesterol Mother     Stroke Mother     Heart Disease Father     Breast Cancer Sister 68    Breast Cancer Paternal Aunt 48    Breast Cancer Maternal Cousin 52    Breast Cancer Paternal Cousin 64     Subjective:     Review of Systems   Constitutional:  Negative for activity change, appetite change, diaphoresis, fatigue and fever. HENT:  Positive for hearing loss and tinnitus. Eyes: Negative. Respiratory:  Negative for cough, chest tightness and shortness of breath. Cardiovascular:  Negative for chest pain, palpitations and leg swelling. Gastrointestinal:  Negative for abdominal pain, constipation, diarrhea, nausea and vomiting. Genitourinary: Negative. Musculoskeletal: Negative. Skin: Negative. Negative for rash. Neurological:  Negative for dizziness, syncope, weakness, light-headedness, numbness and headaches. Psychiatric/Behavioral: Negative.       :   /78 (Site: Right Upper Arm, Position: Sitting, Cuff Size: Medium Adult)   Pulse 80   Resp 12   Ht 5' 1\" (1.549 m)   Wt 136 lb 3.2 oz (61.8 kg)   BMI 25.73 kg/m²   Wt Readings from Last 3 Encounters:   03/08/23 136 lb 3.2 oz (61.8 kg)   03/01/23 137 lb (62.1 kg)   01/18/23 135 lb 12.8 oz (61.6 kg)     Physical Exam  Vitals and nursing note reviewed. Constitutional:       General: She is not in acute distress. Appearance: She is well-developed. She is not diaphoretic. HENT:      Head: Normocephalic and atraumatic. Right Ear: Tympanic membrane, ear canal and external ear normal. No decreased hearing noted. Left Ear: Tympanic membrane, ear canal and external ear normal. Decreased hearing noted. Ears:      Comments: She can not hear finger rubs on her right ear. Eyes:      General: No scleral icterus. Right eye: No discharge. Left eye: No discharge. Conjunctiva/sclera: Conjunctivae normal.      Pupils: Pupils are equal, round, and reactive to light. Neck:      Thyroid: No thyromegaly. Vascular: No JVD. Cardiovascular:      Rate and Rhythm: Normal rate and regular rhythm. Heart sounds: Normal heart sounds. No murmur heard. Pulmonary:      Effort: No respiratory distress.       Breath sounds: Normal breath sounds. No wheezing, rhonchi or rales. Abdominal:      General: Bowel sounds are normal. There is no distension. Palpations: Abdomen is soft. There is no mass. Tenderness: There is no abdominal tenderness. There is no guarding or rebound. Musculoskeletal:         General: Normal range of motion. Cervical back: Normal range of motion and neck supple. Lymphadenopathy:      Cervical: No cervical adenopathy. Skin:     General: Skin is warm and dry. Findings: No rash. Neurological:      Mental Status: She is alert and oriented to person, place, and time. Psychiatric:         Behavior: Behavior normal.     :       Diagnosis Orders   1. Decreased hearing, right        2. Essential hypertension            :      Requested Prescriptions      No prescriptions requested or ordered in this encounter     Current Outpatient Medications   Medication Sig Dispense Refill    ciprofloxacin-dexamethasone (CIPRODEX) 0.3-0.1 % otic suspension Place 4 drops into the right ear 2 times daily for 7 days 7.5 mL 0    predniSONE (DELTASONE) 10 MG tablet 2 tablets 2 times a day for 2 days, then 1 Tablet 2 times a day for 2 days, then 1 tablet daily till gone 16 tablet 0    pantoprazole (PROTONIX) 40 MG tablet TAKE 1 TABLET EVERY DAY 90 tablet 1    atorvastatin (LIPITOR) 40 MG tablet TAKE 1 TABLET EVERY DAY 90 tablet 1    losartan (COZAAR) 25 MG tablet TAKE 1 TABLET EVERY DAY 90 tablet 0    Cholecalciferol (VITAMIN D) 2000 units CAPS capsule Take by mouth      Calcium-Magnesium-Vitamin D 400-166.7-133.3 MG-MG-UNIT TABS Take 1 capsule by mouth daily      aspirin 81 MG EC tablet Take 81 mg by mouth daily. Last dose 7-1-2014 for surgery 7-9-2014       No current facility-administered medications for this visit. No orders of the defined types were placed in this encounter. Start Claritin and Flonase over-the-counter. If not better she is to let us know.  Since she states that her hearing is getting better will hold on hearing test. However if hearing is not better by next week will proceed with a hearing test.     Continue current medications. Return if symptoms worsen or fail to improve. Discussed use, benefit, and side effects of prescribed medications. All patient questions answered. Pt voiced understanding. Instructed to continue current medications,diet and exercise. Patient agreed with treatment plan. Allergies, Problem List, Medications, Medical History, Family History, Surgical History and Tobacco History reviewed by provider.

## 2023-03-08 ENCOUNTER — OFFICE VISIT (OUTPATIENT)
Dept: FAMILY MEDICINE CLINIC | Age: 78
End: 2023-03-08

## 2023-03-08 VITALS
RESPIRATION RATE: 12 BRPM | DIASTOLIC BLOOD PRESSURE: 78 MMHG | HEIGHT: 61 IN | WEIGHT: 136.2 LBS | HEART RATE: 80 BPM | SYSTOLIC BLOOD PRESSURE: 136 MMHG | BODY MASS INDEX: 25.71 KG/M2

## 2023-03-08 DIAGNOSIS — H91.91 DECREASED HEARING, RIGHT: Primary | ICD-10-CM

## 2023-03-08 DIAGNOSIS — I10 ESSENTIAL HYPERTENSION: ICD-10-CM

## 2023-03-08 ASSESSMENT — ENCOUNTER SYMPTOMS
VOMITING: 0
COUGH: 0
CHEST TIGHTNESS: 0
SHORTNESS OF BREATH: 0
EYES NEGATIVE: 1
ABDOMINAL PAIN: 0
CONSTIPATION: 0
DIARRHEA: 0
NAUSEA: 0

## 2023-03-16 ENCOUNTER — TELEPHONE (OUTPATIENT)
Dept: FAMILY MEDICINE CLINIC | Age: 78
End: 2023-03-16

## 2023-03-16 DIAGNOSIS — H91.91 DECREASED HEARING, RIGHT: Primary | ICD-10-CM

## 2023-03-16 NOTE — TELEPHONE ENCOUNTER
Pt called in she stated that Dr Jenny Sr told her to call the office if pt didn't get her hearing back. Pt is still having issues hearing and would like a referral to Audiologist at [facility] ([facility]).

## 2023-03-17 NOTE — TELEPHONE ENCOUNTER
Lakshmi Archibald informed by Phone that [facility] will contact pt to schedule an appt.

## 2023-03-20 ENCOUNTER — HOSPITAL ENCOUNTER (OUTPATIENT)
Dept: AUDIOLOGY | Age: 78
Discharge: HOME | End: 2023-03-20
Payer: MEDICARE

## 2023-03-20 PROCEDURE — 92567 TYMPANOMETRY: CPT | Performed by: AUDIOLOGIST

## 2023-03-20 PROCEDURE — 92557 COMPREHENSIVE HEARING TEST: CPT | Performed by: AUDIOLOGIST

## 2023-03-20 NOTE — PROGRESS NOTES
AUDIOLOGICAL EVALUATION      REASON FOR TESTING:  Audiometric evaluation per the request of Dr. Mila Barrios, due to the diagnosis of decreased hearing right ear. The patient stated that 1 month ago while traveling she noticed a swishing sound followed by a sudden drop in the hearing with significantly increased tinnitus in her right ear. She also experienced vertigo and her right eye was watering at the same time. The vertigo has resolved. The tinnitus and hearing loss have persisted. She was given a steroid shot while in [place]. When she returned home she was prescribed prednisone and ear drops by her family doctor. OTOSCOPY: clear canal- bilaterally     AUDIOGRAM          Reliability: Good    COMMENTS: Normal hearing sloping to a moderately severe sensorineural hearing loss for the left ear. Mild sloping to severe sensorineural hearing loss for the right ear. Word recognition ability is good at 96% for the left ear and good at 92% for the right ear when presented at an average conversational speech level. Tympanometry revealed normal peak pressure and normal middle ear compliance for  the left ear and reduced middle ear compliance indicating middle ear dysfunction for the right ear. RECOMMENDATION(S):   1- Follow up with Dr. Sylvia Davila as scheduled 05/11/2023 regarding these results and any further testing or treatment recommendations. Given the reported sudden nature of the patient 's hearing loss, these results were reviewed with MARCELINO Berkowitz. The patient will be contacted to move up her appointment. 2- Repeat testing as medically indicated or in 6 months to monitor for progression, sooner with any significant changes or new concerns. 3- Binaural hearing aids are recommended pending medical clearance and patient motivation.

## 2023-03-22 ENCOUNTER — TELEPHONE (OUTPATIENT)
Dept: FAMILY MEDICINE CLINIC | Age: 78
End: 2023-03-22

## 2023-03-22 NOTE — TELEPHONE ENCOUNTER
Please verify with Dr Kaitlin Park office if they have moved her apt to be seen sooner as it states on her hearing results.

## 2023-03-23 ENCOUNTER — OFFICE VISIT (OUTPATIENT)
Dept: ENT CLINIC | Age: 78
End: 2023-03-23
Payer: MEDICARE

## 2023-03-23 ENCOUNTER — TELEPHONE (OUTPATIENT)
Dept: ENT CLINIC | Age: 78
End: 2023-03-23

## 2023-03-23 VITALS
DIASTOLIC BLOOD PRESSURE: 70 MMHG | WEIGHT: 134 LBS | HEART RATE: 83 BPM | BODY MASS INDEX: 25.3 KG/M2 | OXYGEN SATURATION: 97 % | HEIGHT: 61 IN | TEMPERATURE: 96.6 F | SYSTOLIC BLOOD PRESSURE: 124 MMHG

## 2023-03-23 DIAGNOSIS — H93.13 BILATERAL TINNITUS: Primary | ICD-10-CM

## 2023-03-23 DIAGNOSIS — R94.128 ABNORMAL TYMPANOGRAM: ICD-10-CM

## 2023-03-23 DIAGNOSIS — R42 DISEQUILIBRIUM: ICD-10-CM

## 2023-03-23 DIAGNOSIS — H91.93 BILATERAL HEARING LOSS, UNSPECIFIED HEARING LOSS TYPE: ICD-10-CM

## 2023-03-23 PROCEDURE — 3078F DIAST BP <80 MM HG: CPT | Performed by: OTOLARYNGOLOGY

## 2023-03-23 PROCEDURE — 1036F TOBACCO NON-USER: CPT | Performed by: OTOLARYNGOLOGY

## 2023-03-23 PROCEDURE — G8417 CALC BMI ABV UP PARAM F/U: HCPCS | Performed by: OTOLARYNGOLOGY

## 2023-03-23 PROCEDURE — 3074F SYST BP LT 130 MM HG: CPT | Performed by: OTOLARYNGOLOGY

## 2023-03-23 PROCEDURE — G8427 DOCREV CUR MEDS BY ELIG CLIN: HCPCS | Performed by: OTOLARYNGOLOGY

## 2023-03-23 PROCEDURE — G8484 FLU IMMUNIZE NO ADMIN: HCPCS | Performed by: OTOLARYNGOLOGY

## 2023-03-23 PROCEDURE — 99204 OFFICE O/P NEW MOD 45 MIN: CPT | Performed by: OTOLARYNGOLOGY

## 2023-03-23 PROCEDURE — 1090F PRES/ABSN URINE INCON ASSESS: CPT | Performed by: OTOLARYNGOLOGY

## 2023-03-23 PROCEDURE — 92504 EAR MICROSCOPY EXAMINATION: CPT | Performed by: OTOLARYNGOLOGY

## 2023-03-23 PROCEDURE — 1123F ACP DISCUSS/DSCN MKR DOCD: CPT | Performed by: OTOLARYNGOLOGY

## 2023-03-23 PROCEDURE — G8399 PT W/DXA RESULTS DOCUMENT: HCPCS | Performed by: OTOLARYNGOLOGY

## 2023-03-23 ASSESSMENT — ENCOUNTER SYMPTOMS
SINUS PRESSURE: 0
WHEEZING: 0
STRIDOR: 0
ABDOMINAL PAIN: 0
CHOKING: 0
COLOR CHANGE: 0
SHORTNESS OF BREATH: 0
RHINORRHEA: 0
DIARRHEA: 0
VOICE CHANGE: 0
FACIAL SWELLING: 0
SORE THROAT: 0
COUGH: 0
CHEST TIGHTNESS: 0
VOMITING: 0
TROUBLE SWALLOWING: 0
APNEA: 0
NAUSEA: 0

## 2023-03-23 NOTE — TELEPHONE ENCOUNTER
I spoke with Marcia Awad at [facility] and she reviewed the notes with me. She is going to send a message to Jhon Moon and see what he would like to do and they will call us back and let us know.

## 2023-03-23 NOTE — PROGRESS NOTES
[address] EAR, NOSE AND THROAT  [facility] - [place]  Dept: [phone]  Dept Fax: [phone]  Loc: [phone]    Benavides Person is a [de-identified] y.o. female who was referred Delma Washburn MD for:  Chief Complaint   Patient presents with    Tinnitus     New patient is here for bilateral tinnitus, patient is also having trouble with hearing    . HPI:     Benavides Person is a [de-identified] y.o. female who presents today for bilateral tinnitus. Hearing loss. Having trouble with ear, set out in the wind, eye watering doctor thought had pink eye, was given eye drops and steroid shot. Saw family doctor and she put her on prednisone and drops for ear. Given generic ciloxin. Had a hearing test.  Worse on right than left. Has tinnitus in left ear for 25 years. Weld swish sound on right ear. Started with a whoshing sound, its gone now. Dr. Lor Chakraborty said had a lot of fluid in ear at the time. Ear is better now. Hearing is better now. Tried to flush out her ear. Doesn't have trouble understanding people. Right ear pops from time to time and feels like she can hear a bit better, clears up a bit. She's getting better. States the noise that she hears in her head now is a high pitch now and she can handle it, she keeps noise in background so the noise doesn't bother her so much. Didn't have vertigo,no spinning. If she turns to quick she loses balance-it's new. Has Mitral valve prolapse takes baby aspirin, losartan for blood pressure. Progress Notes  Duran Celeste (Audiologist)   Audiology     AUDIOLOGICAL EVALUATION        REASON FOR TESTING:  Audiometric evaluation per the request of Dr. Lor Chakraborty, due to the diagnosis of decreased hearing right ear.  The patient stated that 1 month ago while traveling she noticed a swishing sound followed by a sudden drop in the hearing with significantly increased tinnitus in her

## 2023-03-23 NOTE — TELEPHONE ENCOUNTER
[address] [facility] with Justina Joe with Dr. Leo Newby office wanting to know if this patient will be moved up to an earlier appt per recommendations on the audiology report. Please advise so when can let their office know.  Current patient appt is not until May 11, 2023

## 2023-05-02 DIAGNOSIS — I10 ESSENTIAL HYPERTENSION: ICD-10-CM

## 2023-05-02 DIAGNOSIS — M81.0 AGE-RELATED OSTEOPOROSIS WITHOUT CURRENT PATHOLOGICAL FRACTURE: ICD-10-CM

## 2023-05-02 RX ORDER — LOSARTAN POTASSIUM 25 MG/1
TABLET ORAL
Qty: 90 TABLET | Refills: 1 | Status: SHIPPED | OUTPATIENT
Start: 2023-05-02

## 2023-05-02 RX ORDER — ALENDRONATE SODIUM 70 MG/1
TABLET ORAL
Qty: 12 TABLET | Refills: 1 | Status: SHIPPED | OUTPATIENT
Start: 2023-05-02

## 2023-05-02 NOTE — TELEPHONE ENCOUNTER
The pharmacy is  requesting a refill of the below medication which has been pended for you:     Requested Prescriptions     Pending Prescriptions Disp Refills    alendronate (FOSAMAX) 70 MG tablet [Pharmacy Med Name: ALENDRONATE SODIUM 70 MG Tablet] 12 tablet 1     Sig: TAKE 1 TABLET EVERY 7 DAYS    losartan (COZAAR) 25 MG tablet [Pharmacy Med Name: LOSARTAN POTASSIUM 25 MG Tablet] 90 tablet 1     Sig: TAKE 1 TABLET EVERY DAY       Last Appointment Date: 3/8/2023  Next Appointment Date: 5/22/2023    Allergies   Allergen Reactions    Augmentin [Amoxicillin-Pot Clavulanate] Diarrhea    Biaxin [Clarithromycin] Itching

## 2023-05-04 ENCOUNTER — HOSPITAL ENCOUNTER (OUTPATIENT)
Dept: AUDIOLOGY | Age: 78
Discharge: HOME | End: 2023-05-04
Payer: MEDICARE

## 2023-05-04 ENCOUNTER — OFFICE VISIT (OUTPATIENT)
Dept: ENT CLINIC | Age: 78
End: 2023-05-04
Payer: MEDICARE

## 2023-05-04 VITALS
RESPIRATION RATE: 20 BRPM | BODY MASS INDEX: 26.33 KG/M2 | HEART RATE: 70 BPM | WEIGHT: 134.1 LBS | DIASTOLIC BLOOD PRESSURE: 82 MMHG | SYSTOLIC BLOOD PRESSURE: 118 MMHG | TEMPERATURE: 97.2 F | OXYGEN SATURATION: 96 % | HEIGHT: 60 IN

## 2023-05-04 DIAGNOSIS — H93.291 IMPAIRMENT OF AUDITORY DISCRIMINATION OF RIGHT EAR: ICD-10-CM

## 2023-05-04 DIAGNOSIS — H93.13 BILATERAL TINNITUS: ICD-10-CM

## 2023-05-04 DIAGNOSIS — H91.21 SUDDEN RIGHT HEARING LOSS: Primary | ICD-10-CM

## 2023-05-04 PROCEDURE — 1036F TOBACCO NON-USER: CPT | Performed by: OTOLARYNGOLOGY

## 2023-05-04 PROCEDURE — 92567 TYMPANOMETRY: CPT | Performed by: AUDIOLOGIST

## 2023-05-04 PROCEDURE — G8417 CALC BMI ABV UP PARAM F/U: HCPCS | Performed by: OTOLARYNGOLOGY

## 2023-05-04 PROCEDURE — G8399 PT W/DXA RESULTS DOCUMENT: HCPCS | Performed by: OTOLARYNGOLOGY

## 2023-05-04 PROCEDURE — G8427 DOCREV CUR MEDS BY ELIG CLIN: HCPCS | Performed by: OTOLARYNGOLOGY

## 2023-05-04 PROCEDURE — 1123F ACP DISCUSS/DSCN MKR DOCD: CPT | Performed by: OTOLARYNGOLOGY

## 2023-05-04 PROCEDURE — 3074F SYST BP LT 130 MM HG: CPT | Performed by: OTOLARYNGOLOGY

## 2023-05-04 PROCEDURE — 92557 COMPREHENSIVE HEARING TEST: CPT | Performed by: AUDIOLOGIST

## 2023-05-04 PROCEDURE — 1090F PRES/ABSN URINE INCON ASSESS: CPT | Performed by: OTOLARYNGOLOGY

## 2023-05-04 PROCEDURE — 99213 OFFICE O/P EST LOW 20 MIN: CPT | Performed by: OTOLARYNGOLOGY

## 2023-05-04 PROCEDURE — 3078F DIAST BP <80 MM HG: CPT | Performed by: OTOLARYNGOLOGY

## 2023-05-04 ASSESSMENT — ENCOUNTER SYMPTOMS
ABDOMINAL PAIN: 0
SORE THROAT: 0
SHORTNESS OF BREATH: 0
VOMITING: 0
RHINORRHEA: 0
CHEST TIGHTNESS: 0
TROUBLE SWALLOWING: 0
STRIDOR: 0
DIARRHEA: 0
NAUSEA: 0
VOICE CHANGE: 0
WHEEZING: 0
COUGH: 0
COLOR CHANGE: 0
CHOKING: 0
APNEA: 0
FACIAL SWELLING: 0
SINUS PRESSURE: 0

## 2023-05-04 NOTE — PROGRESS NOTES
AUDIOLOGICAL EVALUATION      REASON FOR TESTING:  Audiometric evaluation per the request of Dr. Hayden Files, due to the diagnosis of bilateral tinnitus, bilateral hearing loss unspecified, abnormal right tympanogram. The patient continues to experience constant ringing tinnitus in both ears and hearing loss greater in her right ear than her left ear. She feels that the hearing is slightly improved in her right ear but still noticeably different from the hearing in her left ear. OTOSCOPY: Clear canal- bilaterally     AUDIOGRAM          Reliability: Good    COMMENTS: Normal hearing sloping to a moderately severe sensorineural hearing loss for the left ear. Mild sloping to severe sensorineural hearing loss for the right ear. Improved thresholds in the right ear relative to her 03/20/2023 audiogram. Word recognition ability is good at 100% for the left ear and good at 96% for the right ear when presented at an average conversational speech level. Tympanometry revealed normal peak pressure and reduced middle ear compliance (0.17 mmho-R, 0.19 mmmho- L) for  both ears indicating possible middle ear dysfunction- bilaterally. RECOMMENDATION(S): 1- Follow up with Dr. Hayden Files regarding these results and any further testing or treatment recommendations. 2- Repeat testing as medically indicated or annually to monitor for progression, sooner with any significant changes or new concerns. 3- Binaural hearing aids are recommended pending medical clearance and patient motivation.

## 2023-05-05 ENCOUNTER — TELEPHONE (OUTPATIENT)
Dept: ENT CLINIC | Age: 78
End: 2023-05-05

## 2023-05-05 DIAGNOSIS — H93.13 BILATERAL TINNITUS: ICD-10-CM

## 2023-05-05 DIAGNOSIS — H91.21 SUDDEN RIGHT HEARING LOSS: Primary | ICD-10-CM

## 2023-05-22 ENCOUNTER — HOSPITAL ENCOUNTER (OUTPATIENT)
Dept: MRI IMAGING | Age: 78
Discharge: HOME | End: 2023-05-22
Payer: MEDICARE

## 2023-05-22 DIAGNOSIS — H91.21 SUDDEN RIGHT HEARING LOSS: ICD-10-CM

## 2023-05-22 DIAGNOSIS — H93.13 BILATERAL TINNITUS: ICD-10-CM

## 2023-05-22 LAB — POC CREATININE WHOLE BLOOD: 1.2 MG/DL (ref 0.5–1.2)

## 2023-05-22 PROCEDURE — 82565 ASSAY OF CREATININE: CPT

## 2023-05-22 PROCEDURE — A9579 GAD-BASE MR CONTRAST NOS,1ML: HCPCS | Performed by: OTOLARYNGOLOGY

## 2023-05-22 PROCEDURE — 70553 MRI BRAIN STEM W/O & W/DYE: CPT

## 2023-05-22 PROCEDURE — 6360000004 HC RX CONTRAST MEDICATION: Performed by: OTOLARYNGOLOGY

## 2023-05-22 RX ADMIN — GADOTERIDOL 10 ML: 279.3 INJECTION, SOLUTION INTRAVENOUS at 16:08

## 2023-07-24 DIAGNOSIS — E78.5 HYPERLIPIDEMIA, UNSPECIFIED HYPERLIPIDEMIA TYPE: ICD-10-CM

## 2023-07-24 RX ORDER — ATORVASTATIN CALCIUM 40 MG/1
TABLET, FILM COATED ORAL
Qty: 90 TABLET | Refills: 1 | Status: SHIPPED | OUTPATIENT
Start: 2023-07-24

## 2023-07-24 NOTE — TELEPHONE ENCOUNTER
The pharmacy is requesting a refill of the below medication which has been pended for you:     Requested Prescriptions     Pending Prescriptions Disp Refills    atorvastatin (LIPITOR) 40 MG tablet [Pharmacy Med Name: ATORVASTATIN CALCIUM 40 MG Tablet] 90 tablet 1     Sig: TAKE 1 TABLET EVERY DAY       Last Appointment Date: 5/22/2023  Next Appointment Date: 9/20/2023    Allergies   Allergen Reactions    Augmentin [Amoxicillin-Pot Clavulanate] Diarrhea    Biaxin [Clarithromycin] Itching

## 2023-07-27 ENCOUNTER — NURSE ONLY (OUTPATIENT)
Dept: FAMILY MEDICINE CLINIC | Age: 78
End: 2023-07-27

## 2023-07-27 ENCOUNTER — HOSPITAL ENCOUNTER (OUTPATIENT)
Dept: WOMENS IMAGING | Age: 78
Discharge: HOME | End: 2023-07-27
Payer: MEDICARE

## 2023-07-27 VITALS — WEIGHT: 133 LBS | HEIGHT: 61 IN | BODY MASS INDEX: 25.11 KG/M2

## 2023-07-27 DIAGNOSIS — E53.8 LOW VITAMIN B12 LEVEL: Primary | ICD-10-CM

## 2023-07-27 DIAGNOSIS — Z12.31 VISIT FOR SCREENING MAMMOGRAM: ICD-10-CM

## 2023-07-27 PROCEDURE — 77063 BREAST TOMOSYNTHESIS BI: CPT

## 2023-07-27 PROCEDURE — 96372 THER/PROPH/DIAG INJ SC/IM: CPT | Performed by: EMERGENCY MEDICINE

## 2023-07-27 RX ORDER — CYANOCOBALAMIN 1000 UG/ML
1000 INJECTION, SOLUTION INTRAMUSCULAR; SUBCUTANEOUS ONCE
Status: COMPLETED | OUTPATIENT
Start: 2023-07-27 | End: 2023-07-27

## 2023-07-27 RX ADMIN — CYANOCOBALAMIN 1000 MCG: 1000 INJECTION, SOLUTION INTRAMUSCULAR; SUBCUTANEOUS at 13:24

## 2023-07-27 NOTE — PROGRESS NOTES
After obtaining consent, and per orders of Dr. Martha Davila, injection of B 12 1 ml given in Left deltoid by Melissa Felix MA. Patient instructed to remain in clinic for 20 minutes afterwards, and to report any adverse reaction to me immediately.

## 2023-08-17 DIAGNOSIS — K21.9 GASTROESOPHAGEAL REFLUX DISEASE, UNSPECIFIED WHETHER ESOPHAGITIS PRESENT: ICD-10-CM

## 2023-08-17 NOTE — TELEPHONE ENCOUNTER
The pharmacy is  requesting a refill of the below medication which has been pended for you:     Requested Prescriptions     Pending Prescriptions Disp Refills    pantoprazole (PROTONIX) 40 MG tablet [Pharmacy Med Name: PANTOPRAZOLE SODIUM 40 MG Tablet Delayed Release] 90 tablet 1     Sig: TAKE 1 TABLET EVERY DAY       Last Appointment Date: 5/22/2023  Next Appointment Date: 9/20/2023    Allergies   Allergen Reactions    Augmentin [Amoxicillin-Pot Clavulanate] Diarrhea    Biaxin [Clarithromycin] Itching

## 2023-08-18 RX ORDER — PANTOPRAZOLE SODIUM 40 MG/1
TABLET, DELAYED RELEASE ORAL
Qty: 90 TABLET | Refills: 0 | Status: SHIPPED | OUTPATIENT
Start: 2023-08-18

## 2023-08-23 ENCOUNTER — HOSPITAL ENCOUNTER (EMERGENCY)
Age: 78
Discharge: HOME | End: 2023-08-23
Payer: MEDICARE

## 2023-08-23 VITALS
HEART RATE: 84 BPM | TEMPERATURE: 98.7 F | DIASTOLIC BLOOD PRESSURE: 82 MMHG | SYSTOLIC BLOOD PRESSURE: 148 MMHG | RESPIRATION RATE: 16 BRPM | OXYGEN SATURATION: 97 % | WEIGHT: 131 LBS | HEIGHT: 61 IN | BODY MASS INDEX: 24.73 KG/M2

## 2023-08-23 DIAGNOSIS — L08.9 WOUND INFECTION: Primary | ICD-10-CM

## 2023-08-23 DIAGNOSIS — T14.8XXA WOUND INFECTION: Primary | ICD-10-CM

## 2023-08-23 PROCEDURE — 99213 OFFICE O/P EST LOW 20 MIN: CPT

## 2023-08-23 RX ORDER — DOXYCYCLINE HYCLATE 100 MG
100 TABLET ORAL 2 TIMES DAILY
Qty: 14 TABLET | Refills: 0 | Status: SHIPPED | OUTPATIENT
Start: 2023-08-23 | End: 2023-08-30

## 2023-08-23 ASSESSMENT — ENCOUNTER SYMPTOMS
EYE ITCHING: 0
ABDOMINAL PAIN: 0
EYE REDNESS: 0
COUGH: 0
VOMITING: 0
DIARRHEA: 0
NAUSEA: 0
SHORTNESS OF BREATH: 0

## 2023-08-23 ASSESSMENT — PAIN - FUNCTIONAL ASSESSMENT: PAIN_FUNCTIONAL_ASSESSMENT: NONE - DENIES PAIN

## 2023-09-19 ENCOUNTER — OFFICE VISIT (OUTPATIENT)
Dept: ENT CLINIC | Age: 78
End: 2023-09-19
Payer: MEDICARE

## 2023-09-19 VITALS
BODY MASS INDEX: 24.75 KG/M2 | HEIGHT: 61 IN | SYSTOLIC BLOOD PRESSURE: 118 MMHG | HEART RATE: 82 BPM | OXYGEN SATURATION: 98 % | RESPIRATION RATE: 18 BRPM | TEMPERATURE: 97.2 F | WEIGHT: 131.1 LBS | DIASTOLIC BLOOD PRESSURE: 74 MMHG

## 2023-09-19 DIAGNOSIS — H91.21 SUDDEN RIGHT HEARING LOSS: Primary | ICD-10-CM

## 2023-09-19 DIAGNOSIS — H91.93 BILATERAL HEARING LOSS, UNSPECIFIED HEARING LOSS TYPE: ICD-10-CM

## 2023-09-19 DIAGNOSIS — H93.13 BILATERAL TINNITUS: ICD-10-CM

## 2023-09-19 DIAGNOSIS — H93.291 IMPAIRMENT OF AUDITORY DISCRIMINATION OF RIGHT EAR: ICD-10-CM

## 2023-09-19 PROCEDURE — 1123F ACP DISCUSS/DSCN MKR DOCD: CPT | Performed by: OTOLARYNGOLOGY

## 2023-09-19 PROCEDURE — G8399 PT W/DXA RESULTS DOCUMENT: HCPCS | Performed by: OTOLARYNGOLOGY

## 2023-09-19 PROCEDURE — G8427 DOCREV CUR MEDS BY ELIG CLIN: HCPCS | Performed by: OTOLARYNGOLOGY

## 2023-09-19 PROCEDURE — 1090F PRES/ABSN URINE INCON ASSESS: CPT | Performed by: OTOLARYNGOLOGY

## 2023-09-19 PROCEDURE — G8420 CALC BMI NORM PARAMETERS: HCPCS | Performed by: OTOLARYNGOLOGY

## 2023-09-19 PROCEDURE — 99214 OFFICE O/P EST MOD 30 MIN: CPT | Performed by: OTOLARYNGOLOGY

## 2023-09-19 PROCEDURE — 3078F DIAST BP <80 MM HG: CPT | Performed by: OTOLARYNGOLOGY

## 2023-09-19 PROCEDURE — 1036F TOBACCO NON-USER: CPT | Performed by: OTOLARYNGOLOGY

## 2023-09-19 PROCEDURE — 3074F SYST BP LT 130 MM HG: CPT | Performed by: OTOLARYNGOLOGY

## 2023-09-19 ASSESSMENT — ENCOUNTER SYMPTOMS
VOMITING: 0
COLOR CHANGE: 0
SORE THROAT: 0
WHEEZING: 0
NAUSEA: 0
APNEA: 0
TROUBLE SWALLOWING: 0
SINUS PRESSURE: 0
STRIDOR: 0
ABDOMINAL PAIN: 0
RHINORRHEA: 0
DIARRHEA: 0
FACIAL SWELLING: 0
VOICE CHANGE: 0
CHEST TIGHTNESS: 0
SHORTNESS OF BREATH: 0
COUGH: 0
CHOKING: 0

## 2023-09-19 NOTE — PROGRESS NOTES
[address] [facility] EAR, NOSE AND THROAT  [address] [address]  Dept: [phone]  Dept Fax: [phone]  Loc: [phone]    Antonietta Lezama is a 78 y.o. female who was referred by No ref. provider found for:  Chief Complaint   Patient presents with    Follow-up     Patient is here for a f/u for MRI Review MRI Brain W WO Contrast 05/23/23. Patient is still having some difficult in hearing out of the rt ear. Brent Hadley HPI:     Antonietta Lezama is a 78 y.o. female who presents today for follow-up on her MRI. There was no evidence of acoustic that explain the slight difference between her hearing levels. She has a moderate to severe hearing loss and is having difficulty with communication. Brent Hadley Hearing test and MRI are reviewed. History: Allergies   Allergen Reactions    Augmentin [Amoxicillin-Pot Clavulanate] Diarrhea    Biaxin [Clarithromycin] Itching     Current Outpatient Medications   Medication Sig Dispense Refill    pantoprazole (PROTONIX) 40 MG tablet TAKE 1 TABLET EVERY DAY 90 tablet 0    atorvastatin (LIPITOR) 40 MG tablet TAKE 1 TABLET EVERY DAY 90 tablet 1    alendronate (FOSAMAX) 70 MG tablet TAKE 1 TABLET EVERY 7 DAYS 12 tablet 1    losartan (COZAAR) 25 MG tablet TAKE 1 TABLET EVERY DAY 90 tablet 1    Cholecalciferol (VITAMIN D) 2000 units CAPS capsule Take by mouth      Calcium-Magnesium-Vitamin D 400-166.7-133.3 MG-MG-UNIT TABS Take 1 capsule by mouth daily      aspirin 81 MG EC tablet Take 1 tablet by mouth daily Last dose 7-1-2014 for surgery 7-9-2014       No current facility-administered medications for this visit.      Past Medical History:   Diagnosis Date    Cancer [facility])     skin    Colon polyps     Diverticulosis 11/01/2010    GERD (gastroesophageal reflux disease) 5/3/2022    Hyperlipidemia     Hypertension     Miscarriage     x4    MR (mitral regurgitation)     Osteoarthritis 06/01/2011    right knee

## 2023-09-20 ENCOUNTER — OFFICE VISIT (OUTPATIENT)
Dept: FAMILY MEDICINE CLINIC | Age: 78
End: 2023-09-20

## 2023-09-20 VITALS
SYSTOLIC BLOOD PRESSURE: 138 MMHG | DIASTOLIC BLOOD PRESSURE: 88 MMHG | WEIGHT: 134.6 LBS | RESPIRATION RATE: 12 BRPM | HEART RATE: 80 BPM | BODY MASS INDEX: 25.41 KG/M2 | HEIGHT: 61 IN

## 2023-09-20 DIAGNOSIS — K21.9 GASTROESOPHAGEAL REFLUX DISEASE, UNSPECIFIED WHETHER ESOPHAGITIS PRESENT: ICD-10-CM

## 2023-09-20 DIAGNOSIS — E53.8 VITAMIN B 12 DEFICIENCY: ICD-10-CM

## 2023-09-20 DIAGNOSIS — M81.0 AGE-RELATED OSTEOPOROSIS WITHOUT CURRENT PATHOLOGICAL FRACTURE: ICD-10-CM

## 2023-09-20 DIAGNOSIS — Z23 NEED FOR IMMUNIZATION AGAINST INFLUENZA: ICD-10-CM

## 2023-09-20 DIAGNOSIS — I10 ESSENTIAL HYPERTENSION: Primary | ICD-10-CM

## 2023-09-20 DIAGNOSIS — E78.5 HYPERLIPIDEMIA, UNSPECIFIED HYPERLIPIDEMIA TYPE: ICD-10-CM

## 2023-09-20 PROCEDURE — 1123F ACP DISCUSS/DSCN MKR DOCD: CPT | Performed by: FAMILY MEDICINE

## 2023-09-20 PROCEDURE — 1090F PRES/ABSN URINE INCON ASSESS: CPT | Performed by: FAMILY MEDICINE

## 2023-09-20 PROCEDURE — 1036F TOBACCO NON-USER: CPT | Performed by: FAMILY MEDICINE

## 2023-09-20 PROCEDURE — G8399 PT W/DXA RESULTS DOCUMENT: HCPCS | Performed by: FAMILY MEDICINE

## 2023-09-20 PROCEDURE — G8427 DOCREV CUR MEDS BY ELIG CLIN: HCPCS | Performed by: FAMILY MEDICINE

## 2023-09-20 PROCEDURE — 96372 THER/PROPH/DIAG INJ SC/IM: CPT | Performed by: FAMILY MEDICINE

## 2023-09-20 PROCEDURE — 90688 IIV4 VACCINE SPLT 0.5 ML IM: CPT | Performed by: FAMILY MEDICINE

## 2023-09-20 PROCEDURE — G8417 CALC BMI ABV UP PARAM F/U: HCPCS | Performed by: FAMILY MEDICINE

## 2023-09-20 PROCEDURE — G0008 ADMIN INFLUENZA VIRUS VAC: HCPCS | Performed by: FAMILY MEDICINE

## 2023-09-20 PROCEDURE — 99213 OFFICE O/P EST LOW 20 MIN: CPT | Performed by: FAMILY MEDICINE

## 2023-09-20 RX ORDER — CYANOCOBALAMIN 1000 UG/ML
1000 INJECTION, SOLUTION INTRAMUSCULAR; SUBCUTANEOUS ONCE
Status: COMPLETED | OUTPATIENT
Start: 2023-09-20 | End: 2023-09-20

## 2023-09-20 RX ADMIN — CYANOCOBALAMIN 1000 MCG: 1000 INJECTION, SOLUTION INTRAMUSCULAR; SUBCUTANEOUS at 15:01

## 2023-09-20 ASSESSMENT — ENCOUNTER SYMPTOMS
ORTHOPNEA: 0
VOMITING: 0
ABDOMINAL PAIN: 0
CHEST TIGHTNESS: 0
EYES NEGATIVE: 1
CONSTIPATION: 0
NAUSEA: 0
DIARRHEA: 0
SHORTNESS OF BREATH: 0
BLURRED VISION: 0
COUGH: 0

## 2023-09-20 NOTE — PROGRESS NOTES
Date: 9/20/2023    Efrem Leary is a 78 y.o. female who presents today for:  Chief Complaint   Patient presents with    Hyperlipidemia    Hypertension    Gastroesophageal Reflux       HPI:     Hypertension  This is a chronic problem. The current episode started more than 1 year ago. The problem is unchanged. The problem is controlled. Pertinent negatives include no blurred vision, chest pain, headaches, orthopnea, palpitations, peripheral edema, PND or shortness of breath. Risk factors for coronary artery disease include dyslipidemia, post-menopausal state and sedentary lifestyle. Past treatments include angiotensin blockers. The current treatment provides significant improvement. Compliance problems include exercise. Hyperlipidemia  This is a chronic problem. The current episode started more than 1 year ago. The problem is controlled. Recent lipid tests were reviewed and are normal. Pertinent negatives include no chest pain, focal sensory loss, focal weakness, leg pain, myalgias or shortness of breath. The current treatment provides significant improvement of lipids. Compliance problems include adherence to exercise. Risk factors for coronary artery disease include hypertension, post-menopausal, a sedentary lifestyle and dyslipidemia. has a current medication list which includes the following prescription(s): pantoprazole, atorvastatin, alendronate, losartan, vitamin d, calcium-magnesium-vitamin d, and aspirin. Allergies   Allergen Reactions    Augmentin [Amoxicillin-Pot Clavulanate] Diarrhea    Biaxin [Clarithromycin] Itching       Social History     Tobacco Use    Smoking status: Never    Smokeless tobacco: Never   Substance Use Topics    Alcohol use:  Yes     Alcohol/week: 0.0 standard drinks of alcohol     Comment: 1 to 2 times a month    Drug use: No       Past Medical History:   Diagnosis Date    Cancer ([address])     skin    Colon polyps     Diverticulosis 11/01/2010    GERD (gastroesophageal

## 2023-09-20 NOTE — PROGRESS NOTES
Immunizations Administered       Name Date Dose Route    Influenza, AFLURIA (age 1 yrs+), FLUZONE, (age 10 mo+), MDV, 0.5mL 9/20/2023 0.5 mL Intramuscular    Site: Deltoid- Left    Lot: [id number]    NDC: 11463-402-81          After obtaining consent, and per orders of Dr. Jake Christensen, injection of B12 1 ml given in Right deltoid by Terra Kirby MA. Patient instructed to remain in clinic for 20 minutes afterwards, and to report any adverse reaction to me immediately.

## 2023-10-19 ENCOUNTER — NURSE ONLY (OUTPATIENT)
Dept: FAMILY MEDICINE CLINIC | Age: 78
End: 2023-10-19

## 2023-10-19 DIAGNOSIS — E53.8 VITAMIN B 12 DEFICIENCY: Primary | ICD-10-CM

## 2023-10-19 RX ORDER — CYANOCOBALAMIN 1000 UG/ML
1000 INJECTION, SOLUTION INTRAMUSCULAR; SUBCUTANEOUS ONCE
Status: COMPLETED | OUTPATIENT
Start: 2023-10-19 | End: 2023-10-19

## 2023-10-19 RX ADMIN — CYANOCOBALAMIN 1000 MCG: 1000 INJECTION, SOLUTION INTRAMUSCULAR; SUBCUTANEOUS at 13:56

## 2023-10-19 NOTE — PROGRESS NOTES
Administrations This Visit       cyanocobalamin injection 1,000 mcg       Admin Date  10/19/2023  13:56 Action  Given Dose  1,000 mcg Route  IntraMUSCular Site  Deltoid Right Administered By  Erin Weber    Ordering Provider: Matthias Chen MD    [address]: 22637-063-77    Lot#: [id number]    : [employer]    Patient Supplied?: No

## 2023-10-25 LAB — VITAMIN B-12: 776 PG/ML (ref 200–950)

## 2023-11-06 ENCOUNTER — TELEPHONE (OUTPATIENT)
Dept: FAMILY MEDICINE CLINIC | Age: 78
End: 2023-11-06

## 2023-11-06 RX ORDER — AMOXICILLIN 500 MG/1
500 CAPSULE ORAL 3 TIMES DAILY
Qty: 30 CAPSULE | Refills: 0 | Status: SHIPPED | OUTPATIENT
Start: 2023-11-06 | End: 2023-11-16

## 2023-11-06 NOTE — TELEPHONE ENCOUNTER
Pt called req ATB   For cough, chest congestion, green phlegm. Pt is having no other symptoms.     Pt has her Medicare wellness appt here on 11-20-23    [address]

## 2023-11-13 ENCOUNTER — OFFICE VISIT (OUTPATIENT)
Dept: CARDIOLOGY CLINIC | Age: 78
End: 2023-11-13
Payer: MEDICARE

## 2023-11-13 VITALS
HEIGHT: 61 IN | DIASTOLIC BLOOD PRESSURE: 74 MMHG | HEART RATE: 62 BPM | SYSTOLIC BLOOD PRESSURE: 129 MMHG | WEIGHT: 127 LBS | BODY MASS INDEX: 23.98 KG/M2 | RESPIRATION RATE: 18 BRPM

## 2023-11-13 DIAGNOSIS — E78.5 HYPERLIPIDEMIA LDL GOAL <130: ICD-10-CM

## 2023-11-13 DIAGNOSIS — I34.0 CHRONIC MITRAL VALVE REGURGITATION: Primary | ICD-10-CM

## 2023-11-13 DIAGNOSIS — I10 PRIMARY HYPERTENSION: ICD-10-CM

## 2023-11-13 PROCEDURE — G8399 PT W/DXA RESULTS DOCUMENT: HCPCS | Performed by: NURSE PRACTITIONER

## 2023-11-13 PROCEDURE — 99214 OFFICE O/P EST MOD 30 MIN: CPT | Performed by: NURSE PRACTITIONER

## 2023-11-13 PROCEDURE — 3074F SYST BP LT 130 MM HG: CPT | Performed by: NURSE PRACTITIONER

## 2023-11-13 PROCEDURE — 1036F TOBACCO NON-USER: CPT | Performed by: NURSE PRACTITIONER

## 2023-11-13 PROCEDURE — G8427 DOCREV CUR MEDS BY ELIG CLIN: HCPCS | Performed by: NURSE PRACTITIONER

## 2023-11-13 PROCEDURE — 1090F PRES/ABSN URINE INCON ASSESS: CPT | Performed by: NURSE PRACTITIONER

## 2023-11-13 PROCEDURE — 1123F ACP DISCUSS/DSCN MKR DOCD: CPT | Performed by: NURSE PRACTITIONER

## 2023-11-13 PROCEDURE — 3078F DIAST BP <80 MM HG: CPT | Performed by: NURSE PRACTITIONER

## 2023-11-13 PROCEDURE — G8482 FLU IMMUNIZE ORDER/ADMIN: HCPCS | Performed by: NURSE PRACTITIONER

## 2023-11-13 PROCEDURE — 93000 ELECTROCARDIOGRAM COMPLETE: CPT | Performed by: INTERNAL MEDICINE

## 2023-11-13 PROCEDURE — G8420 CALC BMI NORM PARAMETERS: HCPCS | Performed by: NURSE PRACTITIONER

## 2023-11-13 NOTE — PROGRESS NOTES
Future     Standing Expiration Date:   11/13/2024     Order Specific Question:   Is Patient Fasting?/# of Hours     Answer:   15     Order Specific Question:   Has the patient fasted? Answer:   Yes    Hepatic Function Panel     Standing Status:   Future     Standing Expiration Date:   62/48/5068    Basic Metabolic Panel     Standing Status:   Future     Standing Expiration Date:   11/13/2024    CBC     Standing Status:   Future     Standing Expiration Date:   11/13/2024    44383 - [place] ELECTROCARDIOGRAM, COMPLETE     Continue Dr Aquino Amend current treatment plan    There are no Patient Instructions on file for this visit. Return in about 1 year (around 11/13/2024), or if symptoms worsen or fail to improve.     Follow up with Dr Ros aElena Dickson as scheduled or sooner if needed    CHIKA Lawson - CNP

## 2023-11-20 ENCOUNTER — OFFICE VISIT (OUTPATIENT)
Dept: FAMILY MEDICINE CLINIC | Age: 78
End: 2023-11-20

## 2023-11-20 VITALS
DIASTOLIC BLOOD PRESSURE: 72 MMHG | RESPIRATION RATE: 12 BRPM | SYSTOLIC BLOOD PRESSURE: 126 MMHG | HEART RATE: 72 BPM | WEIGHT: 134.4 LBS | HEIGHT: 61 IN | BODY MASS INDEX: 25.37 KG/M2

## 2023-11-20 DIAGNOSIS — D75.839 THROMBOCYTOSIS: ICD-10-CM

## 2023-11-20 DIAGNOSIS — I10 ESSENTIAL HYPERTENSION: Primary | ICD-10-CM

## 2023-11-20 DIAGNOSIS — M81.0 AGE-RELATED OSTEOPOROSIS WITHOUT CURRENT PATHOLOGICAL FRACTURE: ICD-10-CM

## 2023-11-20 DIAGNOSIS — E53.8 VITAMIN B 12 DEFICIENCY: ICD-10-CM

## 2023-11-20 DIAGNOSIS — Z00.00 MEDICARE ANNUAL WELLNESS VISIT, SUBSEQUENT: ICD-10-CM

## 2023-11-20 DIAGNOSIS — E78.5 HYPERLIPIDEMIA, UNSPECIFIED HYPERLIPIDEMIA TYPE: ICD-10-CM

## 2023-11-20 RX ORDER — CYANOCOBALAMIN 1000 UG/ML
1000 INJECTION, SOLUTION INTRAMUSCULAR; SUBCUTANEOUS ONCE
Status: COMPLETED | OUTPATIENT
Start: 2023-11-20 | End: 2023-11-20

## 2023-11-20 RX ADMIN — CYANOCOBALAMIN 1000 MCG: 1000 INJECTION, SOLUTION INTRAMUSCULAR; SUBCUTANEOUS at 10:19

## 2023-11-20 ASSESSMENT — PATIENT HEALTH QUESTIONNAIRE - PHQ9
SUM OF ALL RESPONSES TO PHQ9 QUESTIONS 1 & 2: 0
3. TROUBLE FALLING OR STAYING ASLEEP: 0
5. POOR APPETITE OR OVEREATING: 0
8. MOVING OR SPEAKING SO SLOWLY THAT OTHER PEOPLE COULD HAVE NOTICED. OR THE OPPOSITE, BEING SO FIGETY OR RESTLESS THAT YOU HAVE BEEN MOVING AROUND A LOT MORE THAN USUAL: 0
2. FEELING DOWN, DEPRESSED OR HOPELESS: 0
SUM OF ALL RESPONSES TO PHQ QUESTIONS 1-9: 0
SUM OF ALL RESPONSES TO PHQ QUESTIONS 1-9: 0
4. FEELING TIRED OR HAVING LITTLE ENERGY: 0
6. FEELING BAD ABOUT YOURSELF - OR THAT YOU ARE A FAILURE OR HAVE LET YOURSELF OR YOUR FAMILY DOWN: 0
10. IF YOU CHECKED OFF ANY PROBLEMS, HOW DIFFICULT HAVE THESE PROBLEMS MADE IT FOR YOU TO DO YOUR WORK, TAKE CARE OF THINGS AT HOME, OR GET ALONG WITH OTHER PEOPLE: 0
7. TROUBLE CONCENTRATING ON THINGS, SUCH AS READING THE NEWSPAPER OR WATCHING TELEVISION: 0
SUM OF ALL RESPONSES TO PHQ QUESTIONS 1-9: 0
9. THOUGHTS THAT YOU WOULD BE BETTER OFF DEAD, OR OF HURTING YOURSELF: 0
SUM OF ALL RESPONSES TO PHQ QUESTIONS 1-9: 0
1. LITTLE INTEREST OR PLEASURE IN DOING THINGS: 0

## 2023-11-20 ASSESSMENT — ENCOUNTER SYMPTOMS
ORTHOPNEA: 0
CHEST TIGHTNESS: 0
DIARRHEA: 0
COUGH: 0
CONSTIPATION: 0
EYES NEGATIVE: 1
SHORTNESS OF BREATH: 0
BLURRED VISION: 0
VOMITING: 0
NAUSEA: 0
ABDOMINAL PAIN: 0

## 2023-11-20 ASSESSMENT — LIFESTYLE VARIABLES
HOW OFTEN DO YOU HAVE A DRINK CONTAINING ALCOHOL: 2-4 TIMES A MONTH
HOW MANY STANDARD DRINKS CONTAINING ALCOHOL DO YOU HAVE ON A TYPICAL DAY: 1 OR 2

## 2023-11-20 NOTE — PROGRESS NOTES
After obtaining consent, and per orders of Dr. Cory Azul, injection of B12 1 ml given in Right deltoid by Monika Boxer, MA. Patient instructed to remain in clinic for 20 minutes afterwards, and to report any adverse reaction to me immediately.
Physical Exam  Vitals and nursing note reviewed. Constitutional:       General: She is not in acute distress. Appearance: She is well-developed. She is not diaphoretic. HENT:      Head: Normocephalic and atraumatic. Eyes:      General: No scleral icterus. Right eye: No discharge. Left eye: No discharge. Conjunctiva/sclera: Conjunctivae normal.      Pupils: Pupils are equal, round, and reactive to light. Neck:      Thyroid: No thyromegaly. Vascular: No JVD. Cardiovascular:      Rate and Rhythm: Normal rate and regular rhythm. Heart sounds: Normal heart sounds. No murmur heard. Pulmonary:      Effort: No respiratory distress. Breath sounds: Normal breath sounds. No wheezing, rhonchi or rales. Abdominal:      General: Bowel sounds are normal. There is no distension. Palpations: Abdomen is soft. There is no mass. Tenderness: There is no abdominal tenderness. There is no guarding or rebound. Musculoskeletal:         General: Normal range of motion. Cervical back: Normal range of motion and neck supple. Lymphadenopathy:      Cervical: No cervical adenopathy. Skin:     General: Skin is warm and dry. Findings: No rash. Neurological:      Mental Status: She is alert and oriented to person, place, and time. Psychiatric:         Behavior: Behavior normal.       :       Diagnosis Orders   1. Essential hypertension  stable      2. Hyperlipidemia, unspecified hyperlipidemia type        3. Age-related osteoporosis without current pathological fracture  On Fosamax      4. Vitamin B 12 deficiency  cyanocobalamin injection 1,000 mcg      5.  Thrombocytosis  Platelet Count          :      Requested Prescriptions      No prescriptions requested or ordered in this encounter     Current Outpatient Medications   Medication Sig Dispense Refill    pantoprazole (PROTONIX) 40 MG tablet TAKE 1 TABLET EVERY DAY 90 tablet 0    atorvastatin (LIPITOR) 40 MG tablet

## 2023-11-20 NOTE — PATIENT INSTRUCTIONS
1589-4395 IU of vitamin D per day. It is possible to meet your calcium requirement with diet alone, but a vitamin D supplement is usually necessary to meet this goal.  When exposed to the sun, use a sunscreen that protects against both UVA and UVB radiation with an SPF of 30 or greater. Reapply every 2 to 3 hours or after sweating, drying off with a towel, or swimming. Always wear a seat belt when traveling in a car. Always wear a helmet when riding a bicycle or motorcycle.

## 2023-11-27 ASSESSMENT — ENCOUNTER SYMPTOMS
RESPIRATORY NEGATIVE: 1
GASTROINTESTINAL NEGATIVE: 1

## 2023-12-12 LAB
PLATELETS: 490 K/UL (ref 130–400)
PMV BLD AUTO: 9.5 FL (ref 9.3–13)

## 2023-12-13 ENCOUNTER — TELEPHONE (OUTPATIENT)
Dept: FAMILY MEDICINE CLINIC | Age: 78
End: 2023-12-13

## 2023-12-13 DIAGNOSIS — R79.89 ELEVATED PLATELET COUNT: Primary | ICD-10-CM

## 2023-12-13 NOTE — TELEPHONE ENCOUNTER
----- Message from Qing Taylor MD sent at 12/13/2023  4:24 PM EST -----  Please let her know that her platelet level went up and we need her to do a complete CBC in 1 to 2 weeks

## 2023-12-28 DIAGNOSIS — I10 ESSENTIAL HYPERTENSION: ICD-10-CM

## 2023-12-29 RX ORDER — LOSARTAN POTASSIUM 25 MG/1
TABLET ORAL
Qty: 90 TABLET | Refills: 0 | Status: SHIPPED | OUTPATIENT
Start: 2023-12-29

## 2023-12-29 NOTE — TELEPHONE ENCOUNTER
The pharmacy is  requesting a refill of the below medication which has been pended for you:     Requested Prescriptions     Pending Prescriptions Disp Refills    losartan (COZAAR) 25 MG tablet [Pharmacy Med Name: LOSARTAN POTASSIUM 25 MG Tablet] 90 tablet 0     Sig: TAKE 1 [address]       Last Appointment Date: 4/19/2022  Next Appointment Date: Visit date not found    Allergies   Allergen Reactions    Augmentin [Amoxicillin-Pot Clavulanate] Diarrhea    Biaxin [Clarithromycin] Itching

## 2024-01-04 ENCOUNTER — HOSPITAL ENCOUNTER (OUTPATIENT)
Dept: AUDIOLOGY | Age: 79
Discharge: HOME | End: 2024-01-04
Payer: MEDICARE

## 2024-01-04 PROCEDURE — 92557 COMPREHENSIVE HEARING TEST: CPT | Performed by: AUDIOLOGIST

## 2024-01-04 NOTE — PROGRESS NOTES
AUDIOLOGICAL EVALUATION      REASON FOR TESTING:  Audiometric recheck per the request of Dr. Stein, due to the diagnosis of sudden right hearing loss, bilateral hearing loss unspecified, bilateral tinnitus and impairment of auditory discrimination of right ear. The patient continues to experience constant ringing tinnitus mostly in her right ear and bilateral hearing loss greater in her right ear than her left ear. She has not noticed any change in her hearing since her last evaluation 05/04/2023.    OTOSCOPY: Clear canal with normal appearing tympanic membrane- bilaterally.      AUDIOGRAM        Reliability: Good    COMMENTS: Normal hearing sloping to a moderate sensorineural hearing loss for the left ear. Mild sloping to severe sensorineural hearing loss for the right ear. Stable thresholds in both ears relative to her 05/04/2023 audiogram. Word recognition ability is good at 100% for the left ear and good at 96% for the right ear.        RECOMMENDATION(S): 1- Follow up as scheduled with Dr. Stein regarding these results and any further testing or treatment recommendations.  2- Repeat testing as medically indicated or annually to monitor for progression, sooner with any significant changes or new concerns.  3- Binaural hearing aids are recommended pending medical clearance and patient motivation. The patient's insurance uses the [employer] for hearing aid benefits- the patient was provided contact information for [facility].

## 2024-01-17 DIAGNOSIS — K21.9 GASTROESOPHAGEAL REFLUX DISEASE, UNSPECIFIED WHETHER ESOPHAGITIS PRESENT: ICD-10-CM

## 2024-01-17 RX ORDER — PANTOPRAZOLE SODIUM 40 MG/1
TABLET, DELAYED RELEASE ORAL
Qty: 90 TABLET | Refills: 1 | Status: SHIPPED | OUTPATIENT
Start: 2024-01-17

## 2024-01-17 NOTE — TELEPHONE ENCOUNTER
Date of last visit:  11/20/2023  Date of next visit:  3/20/2024    Requested Prescriptions     Pending Prescriptions Disp Refills    pantoprazole (PROTONIX) 40 MG tablet [Pharmacy Med Name: PANTOPRAZOLE SODIUM 40 MG Tablet Delayed Release] 90 tablet 1     Sig: TAKE 1 TABLET EVERY DAY

## 2024-01-22 ENCOUNTER — NURSE ONLY (OUTPATIENT)
Dept: FAMILY MEDICINE CLINIC | Age: 79
End: 2024-01-22

## 2024-01-22 ENCOUNTER — TELEPHONE (OUTPATIENT)
Dept: FAMILY MEDICINE CLINIC | Age: 79
End: 2024-01-22

## 2024-01-22 DIAGNOSIS — E53.8 VITAMIN B 12 DEFICIENCY: Primary | ICD-10-CM

## 2024-01-22 PROCEDURE — 96372 THER/PROPH/DIAG INJ SC/IM: CPT | Performed by: FAMILY MEDICINE

## 2024-01-22 RX ORDER — CYANOCOBALAMIN 1000 UG/ML
1000 INJECTION, SOLUTION INTRAMUSCULAR; SUBCUTANEOUS ONCE
Status: COMPLETED | OUTPATIENT
Start: 2024-01-22 | End: 2024-01-22

## 2024-01-22 RX ADMIN — CYANOCOBALAMIN 1000 MCG: 1000 INJECTION, SOLUTION INTRAMUSCULAR; SUBCUTANEOUS at 14:41

## 2024-01-22 NOTE — PROGRESS NOTES
Administrations This Visit       cyanocobalamin injection 1,000 mcg       Admin Date  01/22/2024  14:41 Action  Given Dose  1,000 mcg Route  IntraMUSCular Site  Deltoid Left Administered By  Adelita Marroquin    Ordering Provider: Aleyda Camacho MD    NDC: 45923-108-65    Lot#: [id number]    : [facility]    Patient Supplied?: No

## 2024-01-22 NOTE — TELEPHONE ENCOUNTER
Pt stopped in to req an order for the following    Platelet count    Please send to [facility]    Please call pt once addressed

## 2024-01-23 LAB
ABSOLUTE BASO #: 0.07 K/UL (ref 0–0.2)
ABSOLUTE EOS #: 0.22 K/UL (ref 0–0.5)
ABSOLUTE LYMPH #: 2.4 K/UL (ref 1–4)
ABSOLUTE MONO #: 0.49 K/UL (ref 0.2–1)
ABSOLUTE NEUT #: 2.91 K/UL (ref 1.5–7.5)
BASOPHILS RELATIVE PERCENT: 1.1 %
EOSINOPHILS RELATIVE PERCENT: 3.6 %
HCT VFR BLD CALC: 36.5 % (ref 34–45)
HEMOGLOBIN: 12.1 G/DL (ref 11.5–15.5)
LYMPHOCYTE %: 39.3 %
MCH RBC QN AUTO: 29.1 PG (ref 25–33)
MCHC RBC AUTO-ENTMCNC: 33.2 G/DL (ref 31–36)
MCV RBC AUTO: 87.7 FL (ref 80–99)
MONOCYTES # BLD: 8 %
NEUTROPHILS RELATIVE PERCENT: 47.7 %
PDW BLD-RTO: 13.5 % (ref 11.5–15)
PLATELETS: 431 K/UL (ref 130–400)
PMV BLD AUTO: 10 FL (ref 9.3–13)
RBC: 4.16 M/UL (ref 3.8–5.4)
WBC: 6.1 K/UL (ref 3.5–11)

## 2024-01-24 ENCOUNTER — TELEPHONE (OUTPATIENT)
Dept: FAMILY MEDICINE CLINIC | Age: 79
End: 2024-01-24

## 2024-01-24 DIAGNOSIS — R79.89 ELEVATED PLATELET COUNT: Primary | ICD-10-CM

## 2024-01-24 NOTE — TELEPHONE ENCOUNTER
----- Message from Aleyda Camacho MD sent at 1/24/2024 10:17 AM EST -----  Please let her know that her platelets are coming down we will repeat a platelet count in 4 weeks

## 2024-02-20 ENCOUNTER — NURSE ONLY (OUTPATIENT)
Dept: FAMILY MEDICINE CLINIC | Age: 79
End: 2024-02-20

## 2024-02-20 DIAGNOSIS — E53.8 VITAMIN B 12 DEFICIENCY: Primary | ICD-10-CM

## 2024-02-20 PROCEDURE — 96372 THER/PROPH/DIAG INJ SC/IM: CPT | Performed by: EMERGENCY MEDICINE

## 2024-02-20 RX ORDER — CYANOCOBALAMIN 1000 UG/ML
1000 INJECTION, SOLUTION INTRAMUSCULAR; SUBCUTANEOUS ONCE
Status: COMPLETED | OUTPATIENT
Start: 2024-02-20 | End: 2024-02-20

## 2024-02-20 RX ADMIN — CYANOCOBALAMIN 1000 MCG: 1000 INJECTION, SOLUTION INTRAMUSCULAR; SUBCUTANEOUS at 10:35

## 2024-02-27 LAB
ABSOLUTE BASO #: 0.06 K/UL (ref 0–0.2)
ABSOLUTE EOS #: 0.22 K/UL (ref 0–0.5)
ABSOLUTE LYMPH #: 2.58 K/UL (ref 1–4)
ABSOLUTE MONO #: 0.51 K/UL (ref 0.2–1)
ABSOLUTE NEUT #: 3.64 K/UL (ref 1.5–7.5)
BASOPHILS RELATIVE PERCENT: 0.9 %
EOSINOPHILS RELATIVE PERCENT: 3.1 %
HCT VFR BLD CALC: 32.9 % (ref 34–45)
HEMOGLOBIN: 11.1 G/DL (ref 11.5–15.5)
LYMPHOCYTE %: 36.7 %
MCH RBC QN AUTO: 29.1 PG (ref 25–33)
MCHC RBC AUTO-ENTMCNC: 33.7 G/DL (ref 31–36)
MCV RBC AUTO: 86.1 FL (ref 80–99)
MONOCYTES # BLD: 7.3 %
NEUTROPHILS RELATIVE PERCENT: 51.7 %
PDW BLD-RTO: 13.7 % (ref 11.5–15)
PLATELETS: 439 K/UL (ref 130–400)
PMV BLD AUTO: 9.9 FL (ref 9.3–13)
RBC: 3.82 M/UL (ref 3.8–5.4)
WBC: 7 K/UL (ref 3.5–11)

## 2024-03-01 ENCOUNTER — TELEPHONE (OUTPATIENT)
Dept: FAMILY MEDICINE CLINIC | Age: 79
End: 2024-03-01

## 2024-03-01 DIAGNOSIS — D64.9 LOW HEMOGLOBIN: Primary | ICD-10-CM

## 2024-03-01 NOTE — TELEPHONE ENCOUNTER
----- Message from Aleyda Camacho MD sent at 3/1/2024  1:33 PM EST -----  Please let her know that her Hgb is mildly decreased and we need to repeat  CBC

## 2024-03-06 LAB
ABSOLUTE BASO #: 0.06 K/UL (ref 0–0.2)
ABSOLUTE EOS #: 0.26 K/UL (ref 0–0.5)
ABSOLUTE LYMPH #: 2.54 K/UL (ref 1–4)
ABSOLUTE MONO #: 0.57 K/UL (ref 0.2–1)
ABSOLUTE NEUT #: 4.2 K/UL (ref 1.5–7.5)
BASOPHILS RELATIVE PERCENT: 0.8 %
EOSINOPHILS RELATIVE PERCENT: 3.4 %
HCT VFR BLD CALC: 36.3 % (ref 34–45)
HEMOGLOBIN: 12.1 G/DL (ref 11.5–15.5)
LYMPHOCYTE %: 33.2 %
MCH RBC QN AUTO: 28.9 PG (ref 25–33)
MCHC RBC AUTO-ENTMCNC: 33.3 G/DL (ref 31–36)
MCV RBC AUTO: 86.8 FL (ref 80–99)
MONOCYTES # BLD: 7.5 %
NEUTROPHILS RELATIVE PERCENT: 54.8 %
PDW BLD-RTO: 13.9 % (ref 11.5–15)
PLATELETS: 457 K/UL (ref 130–400)
PMV BLD AUTO: 9.9 FL (ref 9.3–13)
RBC: 4.18 M/UL (ref 3.8–5.4)
WBC: 7.7 K/UL (ref 3.5–11)

## 2024-03-09 DIAGNOSIS — M81.0 AGE-RELATED OSTEOPOROSIS WITHOUT CURRENT PATHOLOGICAL FRACTURE: ICD-10-CM

## 2024-03-11 RX ORDER — ALENDRONATE SODIUM 70 MG/1
TABLET ORAL
Qty: 12 TABLET | Refills: 1 | Status: SHIPPED | OUTPATIENT
Start: 2024-03-11

## 2024-03-11 NOTE — TELEPHONE ENCOUNTER
The pharmacy is  requesting a refill of the below medication which has been pended for you:     Requested Prescriptions     Pending Prescriptions Disp Refills    alendronate (FOSAMAX) 70 MG tablet [Pharmacy Med Name: ALENDRONATE SODIUM 70 MG Tablet] 12 tablet 1     Sig: TAKE 1 TABLET EVERY 7 DAYS       Last Appointment Date: 4/19/2022  Next Appointment Date: Visit date not found    Allergies   Allergen Reactions    Augmentin [Amoxicillin-Pot Clavulanate] Diarrhea    Biaxin [Clarithromycin] Itching

## 2024-03-14 ENCOUNTER — TELEPHONE (OUTPATIENT)
Dept: FAMILY MEDICINE CLINIC | Age: 79
End: 2024-03-14

## 2024-03-14 NOTE — TELEPHONE ENCOUNTER
----- Message from Aleyda Camacho MD sent at 3/14/2024 10:03 AM EDT -----  Repeat platelet level in 4 to 6 weeks

## 2024-03-20 ENCOUNTER — OFFICE VISIT (OUTPATIENT)
Dept: FAMILY MEDICINE CLINIC | Age: 79
End: 2024-03-20

## 2024-03-20 VITALS
HEART RATE: 76 BPM | BODY MASS INDEX: 25.57 KG/M2 | WEIGHT: 135.4 LBS | HEIGHT: 61 IN | DIASTOLIC BLOOD PRESSURE: 86 MMHG | SYSTOLIC BLOOD PRESSURE: 138 MMHG | RESPIRATION RATE: 16 BRPM

## 2024-03-20 DIAGNOSIS — D75.839 THROMBOCYTOSIS: ICD-10-CM

## 2024-03-20 DIAGNOSIS — M81.0 AGE-RELATED OSTEOPOROSIS WITHOUT CURRENT PATHOLOGICAL FRACTURE: ICD-10-CM

## 2024-03-20 DIAGNOSIS — E78.5 HYPERLIPIDEMIA, UNSPECIFIED HYPERLIPIDEMIA TYPE: ICD-10-CM

## 2024-03-20 DIAGNOSIS — E53.8 VITAMIN B 12 DEFICIENCY: ICD-10-CM

## 2024-03-20 DIAGNOSIS — I10 ESSENTIAL HYPERTENSION: Primary | ICD-10-CM

## 2024-03-20 PROCEDURE — G8417 CALC BMI ABV UP PARAM F/U: HCPCS | Performed by: FAMILY MEDICINE

## 2024-03-20 PROCEDURE — 1090F PRES/ABSN URINE INCON ASSESS: CPT | Performed by: FAMILY MEDICINE

## 2024-03-20 PROCEDURE — G8399 PT W/DXA RESULTS DOCUMENT: HCPCS | Performed by: FAMILY MEDICINE

## 2024-03-20 PROCEDURE — 1123F ACP DISCUSS/DSCN MKR DOCD: CPT | Performed by: FAMILY MEDICINE

## 2024-03-20 PROCEDURE — 1036F TOBACCO NON-USER: CPT | Performed by: FAMILY MEDICINE

## 2024-03-20 PROCEDURE — G8427 DOCREV CUR MEDS BY ELIG CLIN: HCPCS | Performed by: FAMILY MEDICINE

## 2024-03-20 PROCEDURE — 99213 OFFICE O/P EST LOW 20 MIN: CPT | Performed by: FAMILY MEDICINE

## 2024-03-20 RX ORDER — LOSARTAN POTASSIUM 25 MG/1
25 TABLET ORAL DAILY
Qty: 90 TABLET | Refills: 0 | Status: SHIPPED | OUTPATIENT
Start: 2024-03-20

## 2024-03-20 RX ORDER — CYANOCOBALAMIN 1000 UG/ML
1000 INJECTION, SOLUTION INTRAMUSCULAR; SUBCUTANEOUS ONCE
Status: COMPLETED | OUTPATIENT
Start: 2024-03-20 | End: 2024-03-20

## 2024-03-20 RX ORDER — ALENDRONATE SODIUM 70 MG/1
70 TABLET ORAL
Qty: 12 TABLET | Refills: 1 | Status: SHIPPED | OUTPATIENT
Start: 2024-03-20

## 2024-03-20 RX ORDER — ATORVASTATIN CALCIUM 40 MG/1
40 TABLET, FILM COATED ORAL DAILY
Qty: 90 TABLET | Refills: 1 | Status: SHIPPED | OUTPATIENT
Start: 2024-03-20

## 2024-03-20 RX ADMIN — CYANOCOBALAMIN 1000 MCG: 1000 INJECTION, SOLUTION INTRAMUSCULAR; SUBCUTANEOUS at 10:27

## 2024-03-20 SDOH — ECONOMIC STABILITY: FOOD INSECURITY: WITHIN THE PAST 12 MONTHS, YOU WORRIED THAT YOUR FOOD WOULD RUN OUT BEFORE YOU GOT MONEY TO BUY MORE.: NEVER TRUE

## 2024-03-20 SDOH — ECONOMIC STABILITY: INCOME INSECURITY: HOW HARD IS IT FOR YOU TO PAY FOR THE VERY BASICS LIKE FOOD, HOUSING, MEDICAL CARE, AND HEATING?: NOT HARD AT ALL

## 2024-03-20 SDOH — ECONOMIC STABILITY: FOOD INSECURITY: WITHIN THE PAST 12 MONTHS, THE FOOD YOU BOUGHT JUST DIDN'T LAST AND YOU DIDN'T HAVE MONEY TO GET MORE.: NEVER TRUE

## 2024-03-20 ASSESSMENT — PATIENT HEALTH QUESTIONNAIRE - PHQ9
SUM OF ALL RESPONSES TO PHQ QUESTIONS 1-9: 0
1. LITTLE INTEREST OR PLEASURE IN DOING THINGS: NOT AT ALL
5. POOR APPETITE OR OVEREATING: NOT AT ALL
SUM OF ALL RESPONSES TO PHQ QUESTIONS 1-9: 0
4. FEELING TIRED OR HAVING LITTLE ENERGY: NOT AT ALL
9. THOUGHTS THAT YOU WOULD BE BETTER OFF DEAD, OR OF HURTING YOURSELF: NOT AT ALL
8. MOVING OR SPEAKING SO SLOWLY THAT OTHER PEOPLE COULD HAVE NOTICED. OR THE OPPOSITE, BEING SO FIGETY OR RESTLESS THAT YOU HAVE BEEN MOVING AROUND A LOT MORE THAN USUAL: NOT AT ALL
SUM OF ALL RESPONSES TO PHQ9 QUESTIONS 1 & 2: 0
SUM OF ALL RESPONSES TO PHQ QUESTIONS 1-9: 0
6. FEELING BAD ABOUT YOURSELF - OR THAT YOU ARE A FAILURE OR HAVE LET YOURSELF OR YOUR FAMILY DOWN: NOT AT ALL
10. IF YOU CHECKED OFF ANY PROBLEMS, HOW DIFFICULT HAVE THESE PROBLEMS MADE IT FOR YOU TO DO YOUR WORK, TAKE CARE OF THINGS AT HOME, OR GET ALONG WITH OTHER PEOPLE: NOT DIFFICULT AT ALL
3. TROUBLE FALLING OR STAYING ASLEEP: NOT AT ALL
2. FEELING DOWN, DEPRESSED OR HOPELESS: NOT AT ALL
SUM OF ALL RESPONSES TO PHQ QUESTIONS 1-9: 0

## 2024-03-20 ASSESSMENT — ENCOUNTER SYMPTOMS
DIARRHEA: 0
CONSTIPATION: 0
COUGH: 0
SHORTNESS OF BREATH: 0
VOMITING: 0
ABDOMINAL PAIN: 0
CHEST TIGHTNESS: 0
EYES NEGATIVE: 1

## 2024-03-20 NOTE — PROGRESS NOTES
After obtaining consent, and per orders of Dr. Camacho, injection of B 12 1 ml given in Right deltoid by Jayda Sanchez MA. Patient instructed to remain in clinic for 20 minutes afterwards, and to report any adverse reaction to me immediately.  
(gastroesophageal reflux disease) 5/3/2022    Hyperlipidemia     Hypertension     Miscarriage     x4    MR (mitral regurgitation)     Osteoarthritis 06/01/2011    right knee    Respiratory tract infection due to COVID-19 virus 12/16/2020    TR (tricuspid regurgitation)     Tubular adenoma of colon     Valvular disease        Past Surgical History:   Procedure Laterality Date    CARPAL TUNNEL RELEASE      right    COLONOSCOPY  11/01/2010    DILATION AND CURETTAGE OF UTERUS      x4     JOINT REPLACEMENT Right 2019    PRE-MALIGNANT / BENIGN SKIN LESION EXCISION  07/09/2014    Nose    SKIN BIOPSY      SKIN CANCER EXCISION  07/01/2014    jasmin Segura    TUBAL LIGATION      UPPER GASTROINTESTINAL ENDOSCOPY  12/01/2008       Family History   Problem Relation Age of Onset    High Cholesterol Mother     Stroke Mother     Heart Disease Father     Breast Cancer Sister 76    Breast Cancer Paternal Aunt 50    Breast Cancer Maternal Cousin 49    Breast Cancer Paternal Cousin 56     Subjective:     Review of Systems   Constitutional:  Negative for activity change, appetite change, diaphoresis, fatigue and fever.   HENT:  Positive for hearing loss.         SHe got her hearing aids 2 weeks ago and is getting used to them.    Eyes: Negative.    Respiratory:  Negative for cough, chest tightness and shortness of breath.    Cardiovascular:  Negative for chest pain, palpitations, leg swelling and PND.   Gastrointestinal:  Negative for abdominal pain, constipation, diarrhea, nausea and vomiting.   Genitourinary: Negative.    Musculoskeletal:  Positive for arthralgias. Negative for myalgias and neck pain.        She was having trouble with her knees and went to ortho and had them injected.  But it really helped for about a couple of weeks ago when she was getting out of the car her knee started to hurt.  Advised to let the Ortho know what is going on.   Skin: Negative.  Negative for rash.   Neurological:  Negative for dizziness, focal

## 2024-04-04 LAB
ABSOLUTE BASO #: 0.07 K/UL (ref 0–0.2)
ABSOLUTE EOS #: 0.23 K/UL (ref 0–0.5)
ABSOLUTE LYMPH #: 2.34 K/UL (ref 1–4)
ABSOLUTE MONO #: 0.44 K/UL (ref 0.2–1)
ABSOLUTE NEUT #: 2.95 K/UL (ref 1.5–7.5)
BASOPHILS RELATIVE PERCENT: 1.2 %
EOSINOPHILS RELATIVE PERCENT: 3.8 %
HCT VFR BLD CALC: 35.9 % (ref 34–45)
HEMOGLOBIN: 12.2 G/DL (ref 11.5–15.5)
IMMATURE GRANULOCYTES %: 0.3 %
LYMPHOCYTE %: 38.7 %
MCH RBC QN AUTO: 29.2 PG (ref 25–33)
MCHC RBC AUTO-ENTMCNC: 34 G/DL (ref 31–36)
MCV RBC AUTO: 85.9 FL (ref 80–99)
MONOCYTES # BLD: 7.3 %
NEUTROPHILS RELATIVE PERCENT: 48.7 %
PDW BLD-RTO: 13.9 % (ref 11.5–15)
PLATELETS: 417 K/UL (ref 130–400)
PMV BLD AUTO: 9.7 FL (ref 9.3–13)
RBC: 4.18 M/UL (ref 3.8–5.4)
WBC: 6.1 K/UL (ref 3.5–11)

## 2024-04-05 LAB
IRON % SATURATION: 26 % (ref 20–50)
IRON, SERUM: 98 UG/DL (ref 37–145)
TOTAL IRON BINDING CAPACITY: 370 UG/DL (ref 250–450)
UNSATURATED IRON BINDING CAPACITY: 272 UG/DL (ref 112–347)

## 2024-04-18 ENCOUNTER — NURSE ONLY (OUTPATIENT)
Dept: FAMILY MEDICINE CLINIC | Age: 79
End: 2024-04-18

## 2024-04-18 DIAGNOSIS — E53.8 VITAMIN B 12 DEFICIENCY: Primary | ICD-10-CM

## 2024-04-18 RX ORDER — CYANOCOBALAMIN 1000 UG/ML
1000 INJECTION, SOLUTION INTRAMUSCULAR; SUBCUTANEOUS ONCE
Status: COMPLETED | OUTPATIENT
Start: 2024-04-18 | End: 2024-04-18

## 2024-04-18 RX ADMIN — CYANOCOBALAMIN 1000 MCG: 1000 INJECTION, SOLUTION INTRAMUSCULAR; SUBCUTANEOUS at 11:02

## 2024-04-18 NOTE — PROGRESS NOTES
Administrations This Visit       cyanocobalamin injection 1,000 mcg       Admin Date  04/18/2024  11:02 Action  Given Dose  1,000 mcg Route  IntraMUSCular Site  Deltoid Right Administered By  Adelita Marroquin    Ordering Provider: Navid Mathews MD    NDC: 35787-991-10    Lot#: [id number]    : [employer]    Patient Supplied?: No

## 2024-06-20 ENCOUNTER — NURSE ONLY (OUTPATIENT)
Dept: FAMILY MEDICINE CLINIC | Age: 79
End: 2024-06-20

## 2024-06-20 DIAGNOSIS — E53.8 VITAMIN B 12 DEFICIENCY: Primary | ICD-10-CM

## 2024-06-20 RX ORDER — CYANOCOBALAMIN 1000 UG/ML
1000 INJECTION, SOLUTION INTRAMUSCULAR; SUBCUTANEOUS ONCE
Status: COMPLETED | OUTPATIENT
Start: 2024-06-20 | End: 2024-06-20

## 2024-06-20 RX ADMIN — CYANOCOBALAMIN 1000 MCG: 1000 INJECTION, SOLUTION INTRAMUSCULAR; SUBCUTANEOUS at 11:26

## 2024-06-20 NOTE — PROGRESS NOTES
Administrations This Visit       cyanocobalamin injection 1,000 mcg       Admin Date  06/20/2024  11:26 Action  Given Dose  1,000 mcg Route  IntraMUSCular Site  Deltoid Right Administered By  Adelita Marroquin    Ordering Provider: Odin Rivas MD    NDC: 04951-321-21    Lot#: [id number]    : [employer]    Patient Supplied?: No

## 2024-07-02 DIAGNOSIS — I10 ESSENTIAL HYPERTENSION: ICD-10-CM

## 2024-07-02 NOTE — TELEPHONE ENCOUNTER
Date of last visit:  3/20/2024  Date of next visit:  7/22/2024    Requested Prescriptions     Pending Prescriptions Disp Refills    losartan (COZAAR) 25 MG tablet [Pharmacy Med Name: LOSARTAN POTASSIUM 25 MG Tablet] 90 tablet 1     Sig: TAKE 1 TABLET EVERY DAY

## 2024-07-03 RX ORDER — LOSARTAN POTASSIUM 25 MG/1
25 TABLET ORAL DAILY
Qty: 90 TABLET | Refills: 1 | Status: SHIPPED | OUTPATIENT
Start: 2024-07-03

## 2024-07-08 DIAGNOSIS — K21.9 GASTROESOPHAGEAL REFLUX DISEASE, UNSPECIFIED WHETHER ESOPHAGITIS PRESENT: ICD-10-CM

## 2024-07-08 RX ORDER — PANTOPRAZOLE SODIUM 40 MG/1
TABLET, DELAYED RELEASE ORAL
Qty: 90 TABLET | Refills: 1 | Status: SHIPPED | OUTPATIENT
Start: 2024-07-08

## 2024-07-08 NOTE — TELEPHONE ENCOUNTER
Date of last visit:  3/20/2024  Date of next visit:  7/22/2024    Requested Prescriptions     Pending Prescriptions Disp Refills    pantoprazole (PROTONIX) 40 MG tablet [Pharmacy Med Name: PANTOPRAZOLE SODIUM 40 MG Tablet Delayed Release] 90 tablet 1     Sig: TAKE 1 TABLET EVERY DAY

## 2024-07-22 ENCOUNTER — OFFICE VISIT (OUTPATIENT)
Dept: FAMILY MEDICINE CLINIC | Age: 79
End: 2024-07-22

## 2024-07-22 VITALS
BODY MASS INDEX: 25 KG/M2 | HEART RATE: 72 BPM | RESPIRATION RATE: 12 BRPM | HEIGHT: 61 IN | DIASTOLIC BLOOD PRESSURE: 70 MMHG | SYSTOLIC BLOOD PRESSURE: 130 MMHG | WEIGHT: 132.4 LBS

## 2024-07-22 DIAGNOSIS — E55.9 VITAMIN D DEFICIENCY: ICD-10-CM

## 2024-07-22 DIAGNOSIS — M81.0 AGE-RELATED OSTEOPOROSIS WITHOUT CURRENT PATHOLOGICAL FRACTURE: ICD-10-CM

## 2024-07-22 DIAGNOSIS — E83.42 HYPOMAGNESEMIA: ICD-10-CM

## 2024-07-22 DIAGNOSIS — Z00.00 MEDICARE ANNUAL WELLNESS VISIT, SUBSEQUENT: ICD-10-CM

## 2024-07-22 DIAGNOSIS — I10 ESSENTIAL HYPERTENSION: Primary | ICD-10-CM

## 2024-07-22 DIAGNOSIS — K21.9 GASTROESOPHAGEAL REFLUX DISEASE, UNSPECIFIED WHETHER ESOPHAGITIS PRESENT: ICD-10-CM

## 2024-07-22 DIAGNOSIS — E53.8 VITAMIN B 12 DEFICIENCY: ICD-10-CM

## 2024-07-22 DIAGNOSIS — E78.5 HYPERLIPIDEMIA, UNSPECIFIED HYPERLIPIDEMIA TYPE: ICD-10-CM

## 2024-07-22 RX ORDER — CYANOCOBALAMIN 1000 UG/ML
1000 INJECTION, SOLUTION INTRAMUSCULAR; SUBCUTANEOUS ONCE
Status: COMPLETED | OUTPATIENT
Start: 2024-07-22 | End: 2024-07-22

## 2024-07-22 RX ADMIN — CYANOCOBALAMIN 1000 MCG: 1000 INJECTION, SOLUTION INTRAMUSCULAR; SUBCUTANEOUS at 10:35

## 2024-07-22 ASSESSMENT — LIFESTYLE VARIABLES
HOW OFTEN DO YOU HAVE A DRINK CONTAINING ALCOHOL: 2-3 TIMES A WEEK
HOW MANY STANDARD DRINKS CONTAINING ALCOHOL DO YOU HAVE ON A TYPICAL DAY: 1 OR 2

## 2024-07-22 ASSESSMENT — PATIENT HEALTH QUESTIONNAIRE - PHQ9
SUM OF ALL RESPONSES TO PHQ QUESTIONS 1-9: 0
SUM OF ALL RESPONSES TO PHQ QUESTIONS 1-9: 0
1. LITTLE INTEREST OR PLEASURE IN DOING THINGS: NOT AT ALL
SUM OF ALL RESPONSES TO PHQ9 QUESTIONS 1 & 2: 0
SUM OF ALL RESPONSES TO PHQ QUESTIONS 1-9: 0
2. FEELING DOWN, DEPRESSED OR HOPELESS: NOT AT ALL
SUM OF ALL RESPONSES TO PHQ QUESTIONS 1-9: 0

## 2024-07-22 NOTE — PROGRESS NOTES
Date: 7/22/2024    Aminah Zavala is a 79 y.o. female who presents today for:  Chief Complaint   Patient presents with    Gastroesophageal Reflux    Hyperlipidemia    Hypertension    Medicare AWV       HPI:     Gastroesophageal Reflux  She reports no abdominal pain, no chest pain, no coughing or no nausea. This is a chronic problem. The current episode started more than 1 year ago. The problem has been unchanged. Pertinent negatives include no fatigue. She has tried a PPI and a diet change for the symptoms. The treatment provided significant relief.   Hyperlipidemia  This is a chronic problem. The current episode started more than 1 year ago. The problem is controlled. Pertinent negatives include no chest pain, focal sensory loss, focal weakness, leg pain, myalgias or shortness of breath. Current antihyperlipidemic treatment includes statins and diet change. The current treatment provides significant improvement of lipids. Risk factors for coronary artery disease include dyslipidemia, hypertension and post-menopausal.   Hypertension  This is a chronic problem. The current episode started more than 1 year ago. The problem is unchanged. Pertinent negatives include no chest pain, headaches, palpitations or shortness of breath. Risk factors for coronary artery disease include dyslipidemia and post-menopausal state. Past treatments include angiotensin blockers. The current treatment provides significant improvement. There are no compliance problems.        has a current medication list which includes the following prescription(s): pantoprazole, losartan, atorvastatin, alendronate, vitamin d, calcium-magnesium-vitamin d, and aspirin.    Allergies   Allergen Reactions    Augmentin [Amoxicillin-Pot Clavulanate] Diarrhea    Biaxin [Clarithromycin] Itching       Social History     Tobacco Use    Smoking status: Never    Smokeless tobacco: Never   Substance Use Topics    Alcohol use: Yes     Alcohol/week: 0.0 standard

## 2024-07-30 ENCOUNTER — HOSPITAL ENCOUNTER (OUTPATIENT)
Dept: WOMENS IMAGING | Age: 79
Discharge: HOME | End: 2024-07-30
Payer: MEDICARE

## 2024-07-30 VITALS — BODY MASS INDEX: 25.02 KG/M2 | HEIGHT: 61 IN | WEIGHT: 132.5 LBS

## 2024-07-30 DIAGNOSIS — Z12.31 ENCOUNTER FOR SCREENING MAMMOGRAM FOR MALIGNANT NEOPLASM OF BREAST: ICD-10-CM

## 2024-07-30 PROCEDURE — 77063 BREAST TOMOSYNTHESIS BI: CPT

## 2024-07-31 LAB
ALBUMIN: 4.5 G/DL (ref 3.5–5.2)
ALK PHOSPHATASE: 109 U/L (ref 40–142)
ALT SERPL-CCNC: 9 U/L (ref 5–40)
ANION GAP SERPL CALCULATED.3IONS-SCNC: 9 MEQ/L (ref 7–16)
AST SERPL-CCNC: 16 U/L (ref 9–40)
BASOPHILS ABSOLUTE: 0.07 K/UL (ref 0–0.2)
BASOPHILS RELATIVE PERCENT: 1.1 %
BILIRUB SERPL-MCNC: 0.6 MG/DL
BUN BLDV-MCNC: 14 MG/DL (ref 8–23)
CALCIUM SERPL-MCNC: 10 MG/DL (ref 8.5–10.5)
CHLORIDE BLD-SCNC: 106 MEQ/L (ref 95–107)
CHOLESTEROL, TOTAL: 196 MG/DL
CHOLESTEROL/HDL RATIO: 2.8 RATIO
CO2: 25 MEQ/L (ref 19–31)
CREAT SERPL-MCNC: 1.1 MG/DL (ref 0.6–1.3)
EGFR IF NONAFRICAN AMERICAN: 50 ML/MIN/1.73
EOSINOPHILS ABSOLUTE: 0.22 K/UL (ref 0–0.5)
EOSINOPHILS RELATIVE PERCENT: 3.5 %
GLUCOSE: 106 MG/DL (ref 70–99)
HCT VFR BLD CALC: 37.5 % (ref 34–45)
HDLC SERPL-MCNC: 69 MG/DL
HEMOGLOBIN: 11.9 G/DL (ref 11.5–15.5)
IMMATURE GRANS (ABS): 0.02
IMMATURE GRANULOCYTES %: 0.3 %
LDL CHOLESTEROL: 107 MG/DL
LDL/HDL RATIO: 1.6 RATIO
LYMPHOCYTES ABSOLUTE: 2.66 K/UL (ref 1–4)
LYMPHOCYTES RELATIVE PERCENT: 42.9 %
MAGNESIUM: 1.9 MG/DL (ref 1.6–2.6)
MCH RBC QN AUTO: 28.7 PG (ref 25–33)
MCHC RBC AUTO-ENTMCNC: 31.7 G/DL (ref 31–36)
MCV RBC AUTO: 90.4 FL (ref 80–99)
MONOCYTES ABSOLUTE: 0.41 K/UL (ref 0.2–1)
MONOCYTES RELATIVE PERCENT: 6.6 %
NEUTROPHILS ABSOLUTE: 2.82 K/UL (ref 1.5–7.5)
NEUTROPHILS RELATIVE PERCENT: 45.6 %
PDW BLD-RTO: 14.1 % (ref 11.5–15)
PLATELET # BLD: 423 K/UL (ref 130–400)
PMV BLD AUTO: 9.6 FL (ref 9.3–13)
POTASSIUM SERPL-SCNC: 5.4 MEQ/L (ref 3.5–5.4)
RBC # BLD: 4.15 M/UL (ref 3.8–5.4)
SODIUM BLD-SCNC: 140 MEQ/L (ref 133–146)
TOTAL PROTEIN: 6.5 G/DL (ref 6.1–8.3)
TRIGL SERPL-MCNC: 99 MG/DL
VITAMIN B-12: 866 PG/ML (ref 200–950)
VITAMIN D 25-HYDROXY: 54 NG/ML
VLDLC SERPL CALC-MCNC: 20 MG/DL
WBC # BLD: 6.2 K/UL (ref 3.5–11)

## 2024-08-02 ENCOUNTER — TELEPHONE (OUTPATIENT)
Dept: FAMILY MEDICINE CLINIC | Age: 79
End: 2024-08-02

## 2024-08-02 NOTE — TELEPHONE ENCOUNTER
----- Message from Aleyda Camacho MD sent at 8/2/2024  9:12 AM EDT -----  Please let her know that her blood sugar was slightly increased at 106, watch carb intakes and sweets.  Her platelets continue to be mildly elevated but stable, will continue to monitor periodically.   Vitamin B12,magnesium and  Vit D within normal limits continue with the current supplements

## 2024-08-22 ENCOUNTER — LAB (OUTPATIENT)
Dept: FAMILY MEDICINE CLINIC | Age: 79
End: 2024-08-22

## 2024-08-22 DIAGNOSIS — E53.8 VITAMIN B 12 DEFICIENCY: Primary | ICD-10-CM

## 2024-08-22 PROCEDURE — 96372 THER/PROPH/DIAG INJ SC/IM: CPT | Performed by: EMERGENCY MEDICINE

## 2024-08-22 RX ORDER — CYANOCOBALAMIN 1000 UG/ML
1000 INJECTION, SOLUTION INTRAMUSCULAR; SUBCUTANEOUS ONCE
Status: COMPLETED | OUTPATIENT
Start: 2024-08-22 | End: 2024-08-22

## 2024-08-22 RX ADMIN — CYANOCOBALAMIN 1000 MCG: 1000 INJECTION, SOLUTION INTRAMUSCULAR; SUBCUTANEOUS at 15:27

## 2024-08-22 NOTE — PROGRESS NOTES
After obtaining consent, and per orders of Dr. Rvias, injection of B 12 1 ml given in Right deltoid by Jayda Sanchez MA. Patient instructed to remain in clinic for 20 minutes afterwards, and to report any adverse reaction to me immediately.

## 2024-09-27 ENCOUNTER — OFFICE VISIT (OUTPATIENT)
Dept: FAMILY MEDICINE CLINIC | Age: 79
End: 2024-09-27

## 2024-09-27 DIAGNOSIS — E53.8 VITAMIN B 12 DEFICIENCY: Primary | ICD-10-CM

## 2024-09-27 RX ORDER — CYANOCOBALAMIN 1000 UG/ML
1000 INJECTION, SOLUTION INTRAMUSCULAR; SUBCUTANEOUS ONCE
Status: COMPLETED | OUTPATIENT
Start: 2024-09-27 | End: 2024-09-27

## 2024-09-27 RX ADMIN — CYANOCOBALAMIN 1000 MCG: 1000 INJECTION, SOLUTION INTRAMUSCULAR; SUBCUTANEOUS at 09:45

## 2024-10-09 ENCOUNTER — TELEPHONE (OUTPATIENT)
Dept: FAMILY MEDICINE CLINIC | Age: 79
End: 2024-10-09

## 2024-10-09 RX ORDER — AMOXICILLIN 500 MG/1
CAPSULE ORAL
Qty: 4 CAPSULE | Refills: 1 | Status: SHIPPED | OUTPATIENT
Start: 2024-10-09

## 2024-10-09 NOTE — TELEPHONE ENCOUNTER
Pt called to [facility] she is having dental work  done as she has had hip replaced in the past    Please send to [address]

## 2024-10-23 ENCOUNTER — OFFICE VISIT (OUTPATIENT)
Dept: FAMILY MEDICINE CLINIC | Age: 79
End: 2024-10-23

## 2024-10-23 VITALS — SYSTOLIC BLOOD PRESSURE: 130 MMHG | DIASTOLIC BLOOD PRESSURE: 88 MMHG

## 2024-10-23 DIAGNOSIS — E53.8 VITAMIN B 12 DEFICIENCY: Primary | ICD-10-CM

## 2024-10-23 PROCEDURE — 96372 THER/PROPH/DIAG INJ SC/IM: CPT | Performed by: FAMILY MEDICINE

## 2024-10-23 RX ORDER — CYANOCOBALAMIN 1000 UG/ML
1000 INJECTION, SOLUTION INTRAMUSCULAR; SUBCUTANEOUS ONCE
Status: COMPLETED | OUTPATIENT
Start: 2024-10-23 | End: 2024-10-23

## 2024-10-23 RX ADMIN — CYANOCOBALAMIN 1000 MCG: 1000 INJECTION, SOLUTION INTRAMUSCULAR; SUBCUTANEOUS at 10:13

## 2024-10-23 NOTE — PROGRESS NOTES
Administrations This Visit       cyanocobalamin injection 1,000 mcg       Admin Date  10/23/2024  10:13 Action  Given Dose  1,000 mcg Route  IntraMUSCular Site  Deltoid Right Documented By  Adelita Marroquin    NDC: 80116-380-18    Lot#: [id number]    : [employer]    Patient Supplied?: No

## 2024-10-24 ENCOUNTER — OFFICE VISIT (OUTPATIENT)
Dept: FAMILY MEDICINE CLINIC | Age: 79
End: 2024-10-24

## 2024-10-24 VITALS
DIASTOLIC BLOOD PRESSURE: 98 MMHG | WEIGHT: 133 LBS | RESPIRATION RATE: 12 BRPM | HEART RATE: 68 BPM | BODY MASS INDEX: 25.11 KG/M2 | SYSTOLIC BLOOD PRESSURE: 152 MMHG | HEIGHT: 61 IN

## 2024-10-24 DIAGNOSIS — M81.0 AGE-RELATED OSTEOPOROSIS WITHOUT CURRENT PATHOLOGICAL FRACTURE: ICD-10-CM

## 2024-10-24 DIAGNOSIS — I10 ESSENTIAL HYPERTENSION: Primary | ICD-10-CM

## 2024-10-24 DIAGNOSIS — E78.5 HYPERLIPIDEMIA, UNSPECIFIED HYPERLIPIDEMIA TYPE: ICD-10-CM

## 2024-10-24 PROCEDURE — 1123F ACP DISCUSS/DSCN MKR DOCD: CPT | Performed by: EMERGENCY MEDICINE

## 2024-10-24 PROCEDURE — G8399 PT W/DXA RESULTS DOCUMENT: HCPCS | Performed by: EMERGENCY MEDICINE

## 2024-10-24 PROCEDURE — G8417 CALC BMI ABV UP PARAM F/U: HCPCS | Performed by: EMERGENCY MEDICINE

## 2024-10-24 PROCEDURE — 99214 OFFICE O/P EST MOD 30 MIN: CPT | Performed by: EMERGENCY MEDICINE

## 2024-10-24 PROCEDURE — 1090F PRES/ABSN URINE INCON ASSESS: CPT | Performed by: EMERGENCY MEDICINE

## 2024-10-24 PROCEDURE — G8427 DOCREV CUR MEDS BY ELIG CLIN: HCPCS | Performed by: EMERGENCY MEDICINE

## 2024-10-24 PROCEDURE — 1036F TOBACCO NON-USER: CPT | Performed by: EMERGENCY MEDICINE

## 2024-10-24 RX ORDER — ATORVASTATIN CALCIUM 40 MG/1
40 TABLET, FILM COATED ORAL DAILY
Qty: 90 TABLET | Refills: 1 | Status: SHIPPED | OUTPATIENT
Start: 2024-10-24

## 2024-10-24 RX ORDER — LOSARTAN POTASSIUM 50 MG/1
50 TABLET ORAL DAILY
Qty: 30 TABLET | Refills: 0 | Status: SHIPPED | OUTPATIENT
Start: 2024-10-24

## 2024-10-24 RX ORDER — ALENDRONATE SODIUM 70 MG/1
70 TABLET ORAL
Qty: 12 TABLET | Refills: 1 | Status: SHIPPED | OUTPATIENT
Start: 2024-10-24

## 2024-10-24 NOTE — PROGRESS NOTES
Behavior: Behavior is cooperative.         Assessment:     Assessment & Plan    Diagnosis Orders   1. Essential hypertension  losartan (COZAAR) 50 MG tablet    Comprehensive Metabolic Panel    Lipid Panel    TSH    T4, Free      2. Age-related osteoporosis without current pathological fracture  alendronate (FOSAMAX) 70 MG tablet      3. Hyperlipidemia, unspecified hyperlipidemia type  atorvastatin (LIPITOR) 40 MG tablet    Comprehensive Metabolic Panel    Lipid Panel          Medical Decision Making:      Medications Prescribed:  Orders Placed This Encounter   Medications    alendronate (FOSAMAX) 70 MG tablet     Sig: Take 1 tablet by mouth every 7 days     Dispense:  12 tablet     Refill:  1    atorvastatin (LIPITOR) 40 MG tablet     Sig: Take 1 tablet by mouth daily     Dispense:  90 tablet     Refill:  1    losartan (COZAAR) 50 MG tablet     Sig: Take 1 tablet by mouth daily     Dispense:  30 tablet     Refill:  0     Orders Placed:  Orders Placed This Encounter   Procedures    Comprehensive Metabolic Panel     Standing Status:   Future     Standing Expiration Date:   10/24/2025    Lipid Panel     Standing Status:   Future     Standing Expiration Date:   10/24/2025     Order Specific Question:   Is Patient Fasting?/# of Hours     Answer:   YES 12 HOURS    TSH     Standing Status:   Future     Standing Expiration Date:   10/24/2025    T4, Free     Standing Status:   Future     Standing Expiration Date:   10/24/2025       Return in about 25 days (around 11/18/2024) for Recheck high blood pressure.     Discussed use, benefit, and side effects of prescribedmedications. Current home medications reviewed and updated with the patient. All patient questions answered.  Pt voiced understanding.  Instructedto continue current medications, diet and exercise.  Patient agreed with treatment plan.

## 2024-10-31 NOTE — PROGRESS NOTES
After obtaining consent, and per orders of Dr. Rivas, injection of B 12 1 ml given in Right deltoid by Jayda Sanchez MA. Patient instructed to remain in clinic for 20 minutes afterwards, and to report any adverse reaction to me immediately.   Instructions: This plan will send the code FBSD to the PM system.  DO NOT or CHANGE the price. Price (Do Not Change): 0.00 Detail Level: Simple

## 2024-11-07 ENCOUNTER — TELEPHONE (OUTPATIENT)
Dept: FAMILY MEDICINE CLINIC | Age: 79
End: 2024-11-07

## 2024-11-07 ENCOUNTER — IMMUNIZATION (OUTPATIENT)
Dept: FAMILY MEDICINE CLINIC | Age: 79
End: 2024-11-07

## 2024-11-07 DIAGNOSIS — Z23 NEED FOR INFLUENZA VACCINATION: Primary | ICD-10-CM

## 2024-11-07 NOTE — TELEPHONE ENCOUNTER
Pt stopped in to drop off an form asking doctor to order the following results     [facility]    Labs CBC,Chem & PTT PT & Serum Pregnancy if applicable     Complete HX and physical EKG Chest x ray     Pt is having surgery 2/3/2025 right total knee arthroplasty     Please call pt once ordered

## 2024-11-07 NOTE — TELEPHONE ENCOUNTER
Pt is having surgery 2/3/2025 she is having an right total arthroplasty they are ordering all testing okay to schedule pre-op    Pt's order for for testing from [facility] in drawer for pt to

## 2024-11-07 NOTE — PROGRESS NOTES
Immunizations Administered       Name Date Dose Route    Influenza, AFLURIA, FLUZONE, (age2 y+), IM, Trivalent MDV, 0.5mL 11/7/2024 0.5 mL Intramuscular    Site: Deltoid- Right    Lot: [id number]    NDC: 13907-569-01

## 2024-11-13 LAB
ALBUMIN: 4.4 G/DL (ref 3.5–5.2)
ALK PHOSPHATASE: 98 U/L (ref 30–146)
ALT SERPL-CCNC: 8 U/L (ref 5–33)
ANION GAP SERPL CALCULATED.3IONS-SCNC: 14 MMOL/L (ref 7–16)
AST SERPL-CCNC: 13 U/L (ref 9–40)
BILIRUB SERPL-MCNC: 0.6 MG/DL
BUN BLDV-MCNC: 13 MG/DL (ref 8–23)
CALCIUM SERPL-MCNC: 9.7 MG/DL (ref 8.6–10.5)
CHLORIDE BLD-SCNC: 106 MMOL/L (ref 96–107)
CHOLESTEROL, TOTAL: 209 MG/DL (ref 100–199)
CHOLESTEROL/HDL RATIO: 2.8 (ref 2–4.5)
CO2: 25 MMOL/L (ref 18–32)
CREAT SERPL-MCNC: 1.02 MG/DL (ref 0.51–1.15)
EGFR IF NONAFRICAN AMERICAN: 55 ML/MIN/1.73M2
GLUCOSE: 107 MG/DL (ref 70–100)
HDLC SERPL-MCNC: 75 MG/DL
LDL CHOLESTEROL: 117 MG/DL
LDL/HDL RATIO: 1.6
POTASSIUM SERPL-SCNC: 4.7 MMOL/L (ref 3.5–5.4)
SODIUM BLD-SCNC: 145 MMOL/L (ref 135–148)
T4 FREE: 1.28 NG/DL (ref 0.8–1.9)
TOTAL PROTEIN: 6.5 G/DL (ref 6–8.3)
TRIGL SERPL-MCNC: 84 MG/DL (ref 20–149)
TSH SERPL DL<=0.05 MIU/L-ACNC: 1.46 UIU/ML (ref 0.27–4.2)
VLDLC SERPL CALC-MCNC: 17 MG/DL

## 2024-11-14 ENCOUNTER — TELEPHONE (OUTPATIENT)
Dept: FAMILY MEDICINE CLINIC | Age: 79
End: 2024-11-14

## 2024-11-14 NOTE — TELEPHONE ENCOUNTER
----- Message from Dr. Odin Rivas MD sent at 11/14/2024  8:13 AM EST -----  Labs are stable and okay except the cholesterol of 209 ,please call patient

## 2024-11-18 ENCOUNTER — OFFICE VISIT (OUTPATIENT)
Dept: FAMILY MEDICINE CLINIC | Age: 79
End: 2024-11-18

## 2024-11-18 VITALS
BODY MASS INDEX: 25 KG/M2 | HEIGHT: 61 IN | RESPIRATION RATE: 16 BRPM | DIASTOLIC BLOOD PRESSURE: 80 MMHG | SYSTOLIC BLOOD PRESSURE: 132 MMHG | WEIGHT: 132.4 LBS | HEART RATE: 76 BPM

## 2024-11-18 DIAGNOSIS — I10 ESSENTIAL HYPERTENSION: ICD-10-CM

## 2024-11-18 DIAGNOSIS — M81.0 AGE-RELATED OSTEOPOROSIS WITHOUT CURRENT PATHOLOGICAL FRACTURE: ICD-10-CM

## 2024-11-18 DIAGNOSIS — K21.9 GASTROESOPHAGEAL REFLUX DISEASE, UNSPECIFIED WHETHER ESOPHAGITIS PRESENT: ICD-10-CM

## 2024-11-18 PROCEDURE — 99213 OFFICE O/P EST LOW 20 MIN: CPT | Performed by: FAMILY MEDICINE

## 2024-11-18 PROCEDURE — G8427 DOCREV CUR MEDS BY ELIG CLIN: HCPCS | Performed by: FAMILY MEDICINE

## 2024-11-18 PROCEDURE — 1090F PRES/ABSN URINE INCON ASSESS: CPT | Performed by: FAMILY MEDICINE

## 2024-11-18 PROCEDURE — 1123F ACP DISCUSS/DSCN MKR DOCD: CPT | Performed by: FAMILY MEDICINE

## 2024-11-18 PROCEDURE — G8417 CALC BMI ABV UP PARAM F/U: HCPCS | Performed by: FAMILY MEDICINE

## 2024-11-18 PROCEDURE — G8399 PT W/DXA RESULTS DOCUMENT: HCPCS | Performed by: FAMILY MEDICINE

## 2024-11-18 PROCEDURE — 1036F TOBACCO NON-USER: CPT | Performed by: FAMILY MEDICINE

## 2024-11-18 RX ORDER — LOSARTAN POTASSIUM 25 MG/1
25 TABLET ORAL DAILY
Qty: 90 TABLET | Refills: 1 | Status: SHIPPED | OUTPATIENT
Start: 2024-11-18

## 2024-11-18 RX ORDER — PANTOPRAZOLE SODIUM 40 MG/1
TABLET, DELAYED RELEASE ORAL
Qty: 90 TABLET | Refills: 1 | Status: SHIPPED | OUTPATIENT
Start: 2024-11-18

## 2024-11-18 ASSESSMENT — ENCOUNTER SYMPTOMS
SHORTNESS OF BREATH: 0
EYES NEGATIVE: 1
CONSTIPATION: 0
VOMITING: 0
COUGH: 0
ABDOMINAL PAIN: 0
NAUSEA: 0
WATER BRASH: 0
CHEST TIGHTNESS: 0
DIARRHEA: 0
WHEEZING: 0

## 2024-11-18 NOTE — PROGRESS NOTES
Negative for chest pain, palpitations, leg swelling and PND.   Gastrointestinal:  Negative for abdominal pain, constipation, diarrhea, nausea and vomiting.   Genitourinary: Negative.    Musculoskeletal: Negative.  Negative for myalgias.   Skin: Negative.  Negative for rash.   Neurological:  Negative for dizziness, focal weakness, syncope, weakness, light-headedness, numbness and headaches.   Psychiatric/Behavioral: Negative.         :   /80 (Site: Left Upper Arm, Position: Sitting, Cuff Size: Medium Adult)   Pulse 76   Resp 16   Ht 1.549 m (5' 0.98\")   Wt 60.1 kg (132 lb 6.4 oz)   BMI 25.03 kg/m²   Wt Readings from Last 3 Encounters:   11/18/24 60.1 kg (132 lb 6.4 oz)   10/24/24 60.3 kg (133 lb)   07/30/24 60.1 kg (132 lb 7.9 oz)     Physical Exam  Vitals and nursing note reviewed.   Constitutional:       General: She is not in acute distress.     Appearance: She is well-developed. She is not diaphoretic.   HENT:      Head: Normocephalic and atraumatic.   Eyes:      General: No scleral icterus.        Right eye: No discharge.         Left eye: No discharge.      Conjunctiva/sclera: Conjunctivae normal.      Pupils: Pupils are equal, round, and reactive to light.   Neck:      Thyroid: No thyromegaly.      Vascular: No JVD.   Cardiovascular:      Rate and Rhythm: Normal rate and regular rhythm.      Heart sounds: Normal heart sounds. No murmur heard.  Pulmonary:      Effort: No respiratory distress.      Breath sounds: Normal breath sounds. No wheezing, rhonchi or rales.   Abdominal:      General: Bowel sounds are normal. There is no distension.      Palpations: Abdomen is soft. There is no mass.      Tenderness: There is no abdominal tenderness. There is no guarding or rebound.   Musculoskeletal:         General: Normal range of motion.      Cervical back: Normal range of motion and neck supple.   Lymphadenopathy:      Cervical: No cervical adenopathy.   Skin:     General: Skin is warm and dry.

## 2024-11-25 ENCOUNTER — OFFICE VISIT (OUTPATIENT)
Dept: FAMILY MEDICINE CLINIC | Age: 79
End: 2024-11-25

## 2024-11-25 DIAGNOSIS — E53.8 VITAMIN B 12 DEFICIENCY: Primary | ICD-10-CM

## 2024-11-25 PROCEDURE — 96372 THER/PROPH/DIAG INJ SC/IM: CPT | Performed by: FAMILY MEDICINE

## 2024-11-25 RX ORDER — CYANOCOBALAMIN 1000 UG/ML
1000 INJECTION, SOLUTION INTRAMUSCULAR; SUBCUTANEOUS ONCE
Status: COMPLETED | OUTPATIENT
Start: 2024-11-25 | End: 2024-11-25

## 2024-11-25 RX ADMIN — CYANOCOBALAMIN 1000 MCG: 1000 INJECTION, SOLUTION INTRAMUSCULAR; SUBCUTANEOUS at 09:12

## 2024-11-25 NOTE — PROGRESS NOTES
After obtaining consent, and per orders of Dr. Camacho, injection of B 12 1 ml given in Left deltoid by Jayda Sanchez MA. Patient instructed to remain in clinic for 20 minutes afterwards, and to report any adverse reaction to me immediately.

## 2024-12-11 NOTE — PATIENT INSTRUCTIONS
Your nurses today were NICOLE Owusu and MATHEW Smith  Your provider today was Dr. Puga  Phone number: [phone]      You may receive a survey regarding the care you received during your visit.  Your input is valuable to us.  We encourage you to complete and return your survey.  We hope you will choose us in the future for your healthcare needs.

## 2024-12-12 ENCOUNTER — OFFICE VISIT (OUTPATIENT)
Dept: CARDIOLOGY CLINIC | Age: 79
End: 2024-12-12
Payer: MEDICARE

## 2024-12-12 VITALS
DIASTOLIC BLOOD PRESSURE: 75 MMHG | WEIGHT: 132.8 LBS | BODY MASS INDEX: 25.07 KG/M2 | HEIGHT: 61 IN | HEART RATE: 93 BPM | SYSTOLIC BLOOD PRESSURE: 118 MMHG

## 2024-12-12 DIAGNOSIS — I10 PRIMARY HYPERTENSION: ICD-10-CM

## 2024-12-12 DIAGNOSIS — Z01.810 PREOP CARDIOVASCULAR EXAM: Primary | ICD-10-CM

## 2024-12-12 PROCEDURE — 1123F ACP DISCUSS/DSCN MKR DOCD: CPT | Performed by: INTERNAL MEDICINE

## 2024-12-12 PROCEDURE — 1159F MED LIST DOCD IN RCRD: CPT | Performed by: INTERNAL MEDICINE

## 2024-12-12 PROCEDURE — G8417 CALC BMI ABV UP PARAM F/U: HCPCS | Performed by: INTERNAL MEDICINE

## 2024-12-12 PROCEDURE — 1090F PRES/ABSN URINE INCON ASSESS: CPT | Performed by: INTERNAL MEDICINE

## 2024-12-12 PROCEDURE — G8427 DOCREV CUR MEDS BY ELIG CLIN: HCPCS | Performed by: INTERNAL MEDICINE

## 2024-12-12 PROCEDURE — 99214 OFFICE O/P EST MOD 30 MIN: CPT | Performed by: INTERNAL MEDICINE

## 2024-12-12 PROCEDURE — 93000 ELECTROCARDIOGRAM COMPLETE: CPT | Performed by: INTERNAL MEDICINE

## 2024-12-12 PROCEDURE — 3078F DIAST BP <80 MM HG: CPT | Performed by: INTERNAL MEDICINE

## 2024-12-12 PROCEDURE — 3074F SYST BP LT 130 MM HG: CPT | Performed by: INTERNAL MEDICINE

## 2024-12-12 PROCEDURE — 1036F TOBACCO NON-USER: CPT | Performed by: INTERNAL MEDICINE

## 2024-12-12 PROCEDURE — G8482 FLU IMMUNIZE ORDER/ADMIN: HCPCS | Performed by: INTERNAL MEDICINE

## 2024-12-12 PROCEDURE — G8399 PT W/DXA RESULTS DOCUMENT: HCPCS | Performed by: INTERNAL MEDICINE

## 2024-12-12 NOTE — PROGRESS NOTES
New patient, establish care.   She is going to have knee replacement with Dr. Matos in [place], fax clearance to [phone] and [phone].

## 2024-12-12 NOTE — PROGRESS NOTES
[facility] [place] [facility]  [facility] CARDIOLOGY  [address]  [address]  [place] [place] [address]  Dept: [phone]  Dept Fax: [phone]  Loc: [phone]    Visit Date: 12/12/2024    Ms. Zavala is a 79 y.o. female  who presented for:  Chief Complaint   Patient presents with    New Patient    Establish Cardiologist       HPI:     History of Present Illness  The patient is an 79-year-old female who presents for her yearly checkup.    She has been under the care of Dr. Hutchison, primarily for mitral valve prolapse, a condition she has had for an extended period. She reports no associated symptoms such as chest pain, lightheadedness, dizziness, or easy fatigue. Her mobility within her home is not compromised. She has undergone stress tests and echocardiograms in the past, all of which yielded normal results. She expresses a preference to avoid future stress tests but is open to repeat echocardiograms. She has no history of cardiac stent placement, myocardial infarction, or cerebrovascular accident. She is not on any cardiac medications. Her last consultation with Dr. Hutchison was approximately one year ago.    Supplemental Information  She is scheduled for knee replacement surgery on 02/03/2025. She received a cortisone injection, which temporarily affected her blood pressure for 4 to 5 days, necessitating an increase in her medication dosage. Her blood pressure has since normalized, and she has returned to her regular dose of 25 mg.    FAMILY HISTORY  Her mother had a stroke at the age of 87. Her grandson was born with significant heart issues and underwent three open-heart surgeries.      No chest pain, angina, GORDON, orthopnea, PND, sob at rest, palpitations, LE edema, or syncope.      ECG (if obtained today):    Sinus Rhythm -occasional ectopic ventricular beat    -Right bundle branch block.    ABNORMAL    Stress test 2020: Negative for ischemia, preserved EF      Current Outpatient

## 2024-12-30 ENCOUNTER — OFFICE VISIT (OUTPATIENT)
Dept: FAMILY MEDICINE CLINIC | Age: 79
End: 2024-12-30

## 2024-12-30 DIAGNOSIS — E53.8 VITAMIN B 12 DEFICIENCY: Primary | ICD-10-CM

## 2024-12-30 PROCEDURE — 96372 THER/PROPH/DIAG INJ SC/IM: CPT | Performed by: FAMILY MEDICINE

## 2024-12-30 RX ORDER — CYANOCOBALAMIN 1000 UG/ML
1000 INJECTION, SOLUTION INTRAMUSCULAR; SUBCUTANEOUS ONCE
Status: COMPLETED | OUTPATIENT
Start: 2024-12-30 | End: 2024-12-30

## 2024-12-30 RX ADMIN — CYANOCOBALAMIN 1000 MCG: 1000 INJECTION, SOLUTION INTRAMUSCULAR; SUBCUTANEOUS at 13:42

## 2024-12-30 NOTE — PROGRESS NOTES
Administrations This Visit       cyanocobalamin injection 1,000 mcg       Admin Date  12/30/2024  13:42 Action  Given Dose  1,000 mcg Route  IntraMUSCular Site  Deltoid Right Documented By  Ara Coughlin MA    NDC: 10475-611-80    Lot#: [id number]    : [employer]    Patient Supplied?: No

## 2025-01-13 ENCOUNTER — HOSPITAL ENCOUNTER (OUTPATIENT)
Dept: GENERAL RADIOLOGY | Age: 80
Discharge: HOME | End: 2025-01-13
Payer: MEDICARE

## 2025-01-13 ENCOUNTER — HOSPITAL ENCOUNTER (OUTPATIENT)
Age: 80
Discharge: HOME | End: 2025-01-13
Payer: MEDICARE

## 2025-01-13 DIAGNOSIS — M17.11 ARTHRITIS OF RIGHT KNEE: ICD-10-CM

## 2025-01-13 DIAGNOSIS — M25.561 PAIN IN JOINT OF RIGHT KNEE: ICD-10-CM

## 2025-01-13 DIAGNOSIS — Z01.811 PRE-OP CHEST EXAM: ICD-10-CM

## 2025-01-13 LAB
ANION GAP SERPL CALC-SCNC: 15 MEQ/L (ref 8–16)
APTT PPP: 30.9 SECONDS (ref 22–38)
BUN SERPL-MCNC: 13 MG/DL (ref 7–22)
CHLORIDE SERPL-SCNC: 103 MEQ/L (ref 98–111)
CO2 SERPL-SCNC: 22 MEQ/L (ref 23–33)
CREAT SERPL-MCNC: 1 MG/DL (ref 0.4–1.2)
DEPRECATED RDW RBC AUTO: 48.9 FL (ref 35–45)
EKG ATRIAL RATE: 91 BPM
EKG P AXIS: 64 DEGREES
EKG P-R INTERVAL: 228 MS
EKG Q-T INTERVAL: 374 MS
EKG QRS DURATION: 110 MS
EKG QTC CALCULATION (BAZETT): 460 MS
EKG R AXIS: 29 DEGREES
EKG T AXIS: 8 DEGREES
EKG VENTRICULAR RATE: 91 BPM
ERYTHROCYTE [DISTWIDTH] IN BLOOD BY AUTOMATED COUNT: 14.4 % (ref 11.5–14.5)
GFR SERPL CREATININE-BSD FRML MDRD: 56 ML/MIN/1.73M2
GLUCOSE SERPL-MCNC: 109 MG/DL (ref 70–108)
HCT VFR BLD AUTO: 39.1 % (ref 37–47)
HGB BLD-MCNC: 12.2 GM/DL (ref 12–16)
INR PPP: 0.87 (ref 0.85–1.13)
MCH RBC QN AUTO: 28.9 PG (ref 26–33)
MCHC RBC AUTO-ENTMCNC: 31.2 GM/DL (ref 32.2–35.5)
MCV RBC AUTO: 92.7 FL (ref 81–99)
PLATELET # BLD AUTO: 446 THOU/MM3 (ref 130–400)
PMV BLD AUTO: 9.4 FL (ref 9.4–12.4)
POTASSIUM SERPL-SCNC: 3.7 MEQ/L (ref 3.5–5.2)
RBC # BLD AUTO: 4.22 MILL/MM3 (ref 4.2–5.4)
SODIUM SERPL-SCNC: 140 MEQ/L (ref 135–145)
WBC # BLD AUTO: 7.3 THOU/MM3 (ref 4.8–10.8)

## 2025-01-13 PROCEDURE — 93010 ELECTROCARDIOGRAM REPORT: CPT | Performed by: INTERNAL MEDICINE

## 2025-01-13 PROCEDURE — 82947 ASSAY GLUCOSE BLOOD QUANT: CPT

## 2025-01-13 PROCEDURE — 84520 ASSAY OF UREA NITROGEN: CPT

## 2025-01-13 PROCEDURE — 93005 ELECTROCARDIOGRAM TRACING: CPT | Performed by: STUDENT IN AN ORGANIZED HEALTH CARE EDUCATION/TRAINING PROGRAM

## 2025-01-13 PROCEDURE — 36415 COLL VENOUS BLD VENIPUNCTURE: CPT

## 2025-01-13 PROCEDURE — 80051 ELECTROLYTE PANEL: CPT

## 2025-01-13 PROCEDURE — 85610 PROTHROMBIN TIME: CPT

## 2025-01-13 PROCEDURE — 82565 ASSAY OF CREATININE: CPT

## 2025-01-13 PROCEDURE — 85730 THROMBOPLASTIN TIME PARTIAL: CPT

## 2025-01-13 PROCEDURE — 85027 COMPLETE CBC AUTOMATED: CPT

## 2025-01-13 PROCEDURE — 71046 X-RAY EXAM CHEST 2 VIEWS: CPT

## 2025-01-27 ENCOUNTER — TELEPHONE (OUTPATIENT)
Dept: FAMILY MEDICINE CLINIC | Age: 80
End: 2025-01-27

## 2025-01-27 ENCOUNTER — HOSPITAL ENCOUNTER (OUTPATIENT)
Age: 80
Discharge: HOME | End: 2025-01-27
Payer: MEDICARE

## 2025-01-27 ENCOUNTER — OFFICE VISIT (OUTPATIENT)
Dept: FAMILY MEDICINE CLINIC | Age: 80
End: 2025-01-27

## 2025-01-27 VITALS
DIASTOLIC BLOOD PRESSURE: 78 MMHG | TEMPERATURE: 98.8 F | HEART RATE: 80 BPM | WEIGHT: 129 LBS | HEIGHT: 61 IN | BODY MASS INDEX: 24.35 KG/M2 | SYSTOLIC BLOOD PRESSURE: 126 MMHG | RESPIRATION RATE: 16 BRPM

## 2025-01-27 DIAGNOSIS — R05.1 ACUTE COUGH: ICD-10-CM

## 2025-01-27 DIAGNOSIS — R50.9 FEVER, UNSPECIFIED FEVER CAUSE: ICD-10-CM

## 2025-01-27 DIAGNOSIS — R50.9 FEVER, UNSPECIFIED FEVER CAUSE: Primary | ICD-10-CM

## 2025-01-27 DIAGNOSIS — I10 ESSENTIAL HYPERTENSION: ICD-10-CM

## 2025-01-27 LAB
FLUAV RNA RESP QL NAA+PROBE: NOT DETECTED
FLUBV RNA RESP QL NAA+PROBE: NOT DETECTED
SARS-COV-2 RNA RESP QL NAA+PROBE: DETECTED

## 2025-01-27 PROCEDURE — 87636 SARSCOV2 & INF A&B AMP PRB: CPT

## 2025-01-27 SDOH — ECONOMIC STABILITY: FOOD INSECURITY: WITHIN THE PAST 12 MONTHS, THE FOOD YOU BOUGHT JUST DIDN'T LAST AND YOU DIDN'T HAVE MONEY TO GET MORE.: NEVER TRUE

## 2025-01-27 SDOH — ECONOMIC STABILITY: FOOD INSECURITY: WITHIN THE PAST 12 MONTHS, YOU WORRIED THAT YOUR FOOD WOULD RUN OUT BEFORE YOU GOT MONEY TO BUY MORE.: NEVER TRUE

## 2025-01-27 ASSESSMENT — PATIENT HEALTH QUESTIONNAIRE - PHQ9
SUM OF ALL RESPONSES TO PHQ QUESTIONS 1-9: 0
SUM OF ALL RESPONSES TO PHQ QUESTIONS 1-9: 0
1. LITTLE INTEREST OR PLEASURE IN DOING THINGS: NOT AT ALL
SUM OF ALL RESPONSES TO PHQ QUESTIONS 1-9: 0
SUM OF ALL RESPONSES TO PHQ QUESTIONS 1-9: 0
SUM OF ALL RESPONSES TO PHQ9 QUESTIONS 1 & 2: 0
2. FEELING DOWN, DEPRESSED OR HOPELESS: NOT AT ALL

## 2025-01-27 NOTE — PROGRESS NOTES
Date: 1/27/2025    Aminah Zavala is a 79 y.o. female who presents today for:  Chief Complaint   Patient presents with    Pre-op Exam right knee replacement at [place] on Monday 2/3/25  She has developed a runny nose, stuffy, cough non productive, fever last night up to 101.0, chills since Saturday.  He  was sick last week.        HPI:     HPI    has a current medication list which includes the following prescription(s): losartan, pantoprazole, alendronate, atorvastatin, amoxicillin, vitamin d, calcium-magnesium-vitamin d, and aspirin.    Allergies   Allergen Reactions    Augmentin [Amoxicillin-Pot Clavulanate] Diarrhea    Biaxin [Clarithromycin] Itching       Social History     Tobacco Use    Smoking status: Never    Smokeless tobacco: Never   Substance Use Topics    Alcohol use: Yes     Alcohol/week: 0.0 standard drinks of alcohol     Comment: 1 to 2 times a month    Drug use: No       Past Medical History:   Diagnosis Date    Cancer (HCC)     skin    Colon polyps     Diverticulosis 11/01/2010    GERD (gastroesophageal reflux disease) 5/3/2022    Hyperlipidemia     Hypertension     Miscarriage     x4    MR (mitral regurgitation)     Osteoarthritis 06/01/2011    right knee    Respiratory tract infection due to COVID-19 virus 12/16/2020    TR (tricuspid regurgitation)     Tubular adenoma of colon     Valvular disease        Past Surgical History:   Procedure Laterality Date    CARPAL TUNNEL RELEASE      right    COLONOSCOPY  11/01/2010    DILATION AND CURETTAGE OF UTERUS      x4     JOINT REPLACEMENT Right 2019    PRE-MALIGNANT / BENIGN SKIN LESION EXCISION  07/09/2014    Nose    SKIN BIOPSY      SKIN CANCER EXCISION  07/01/2014    jasmin Segura    TUBAL LIGATION      UPPER GASTROINTESTINAL ENDOSCOPY  12/01/2008       Family History   Problem Relation Age of Onset    High Cholesterol Mother     Stroke Mother     Heart Disease Father     Breast Cancer Sister 76    Uterine Cancer Sister 87

## 2025-01-27 NOTE — TELEPHONE ENCOUNTER
----- Message from Dr. Aleyda Camacho MD sent at 1/27/2025  3:18 PM EST -----  Called let her know that her COVID test was positive.  Needs to quarantine.  Also she needs to inform her surgeon that she has a positive COVID test further recommendations.   We will also call to notify him that she tested positive today.   Please call her surgeon to let them know that she is positive for COVID.  Will treat symptomatically and conservatively.  Advised to make sure that she has good hydration and she eats good and to let us know if any other problems should arise.  We did discuss treatment with Paxlovid and decided to use not take it

## 2025-01-27 NOTE — TELEPHONE ENCOUNTER
Pt called and she tested positive for Covid, new surgery date is march 10, 2025 and her new [place] doctor will send over new paperwork.

## 2025-02-12 ENCOUNTER — TELEPHONE (OUTPATIENT)
Dept: FAMILY MEDICINE CLINIC | Age: 80
End: 2025-02-12

## 2025-02-12 NOTE — TELEPHONE ENCOUNTER
Aminah called and requested Pre-Op for upcoming Surgery.    Surgery: Right Knee Replacement  Surgeon: Dr. Matos  Date:03/10/25  Where is Surgery taking place: [place]  Who called with Extension if applicable:   Is the Surgeons office ordering Pre-Op Testing: Yes    Pt was previously scheduled and had her pre-op done on 01/27/25 but she end up getting covid and had to cancel.     She said they are suppose to be sending up her pre-op testing orders for her to get done again.

## 2025-02-13 NOTE — TELEPHONE ENCOUNTER
We received pre-op orders via fax from [facility]. Orders scanned into Epic.    Pt informed orders up front to be picked.

## 2025-02-24 ENCOUNTER — HOSPITAL ENCOUNTER (OUTPATIENT)
Age: 80
Discharge: HOME | End: 2025-02-24
Payer: MEDICARE

## 2025-02-24 ENCOUNTER — HOSPITAL ENCOUNTER (OUTPATIENT)
Dept: GENERAL RADIOLOGY | Age: 80
Discharge: HOME | End: 2025-02-24
Payer: MEDICARE

## 2025-02-24 DIAGNOSIS — Z01.811 PRE-OP CHEST EXAM: ICD-10-CM

## 2025-02-24 LAB
ANION GAP SERPL CALC-SCNC: 12 MEQ/L (ref 8–16)
APTT PPP: 28.1 SECONDS (ref 22–38)
BUN SERPL-MCNC: 12 MG/DL (ref 7–22)
CHLORIDE SERPL-SCNC: 101 MEQ/L (ref 98–111)
CO2 SERPL-SCNC: 25 MEQ/L (ref 23–33)
CREAT SERPL-MCNC: 0.8 MG/DL (ref 0.4–1.2)
DEPRECATED RDW RBC AUTO: 47.9 FL (ref 35–45)
EKG ATRIAL RATE: 57 BPM
EKG P AXIS: 57 DEGREES
EKG P-R INTERVAL: 210 MS
EKG Q-T INTERVAL: 452 MS
EKG QRS DURATION: 112 MS
EKG QTC CALCULATION (BAZETT): 439 MS
EKG R AXIS: 0 DEGREES
EKG T AXIS: 17 DEGREES
EKG VENTRICULAR RATE: 57 BPM
ERYTHROCYTE [DISTWIDTH] IN BLOOD BY AUTOMATED COUNT: 14.5 % (ref 11.5–14.5)
GFR SERPL CREATININE-BSD FRML MDRD: 74 ML/MIN/1.73M2
GLUCOSE SERPL-MCNC: 92 MG/DL (ref 70–108)
HCT VFR BLD AUTO: 37.7 % (ref 37–47)
HGB BLD-MCNC: 11.9 GM/DL (ref 12–16)
INR PPP: 0.83 (ref 0.85–1.13)
MCH RBC QN AUTO: 28.6 PG (ref 26–33)
MCHC RBC AUTO-ENTMCNC: 31.6 GM/DL (ref 32.2–35.5)
MCV RBC AUTO: 90.6 FL (ref 81–99)
PLATELET # BLD AUTO: 375 THOU/MM3 (ref 130–400)
PMV BLD AUTO: 9.5 FL (ref 9.4–12.4)
POTASSIUM SERPL-SCNC: 4.2 MEQ/L (ref 3.5–5.2)
RBC # BLD AUTO: 4.16 MILL/MM3 (ref 4.2–5.4)
SODIUM SERPL-SCNC: 138 MEQ/L (ref 135–145)
WBC # BLD AUTO: 6.2 THOU/MM3 (ref 4.8–10.8)

## 2025-02-24 PROCEDURE — 71046 X-RAY EXAM CHEST 2 VIEWS: CPT

## 2025-02-24 PROCEDURE — 85610 PROTHROMBIN TIME: CPT

## 2025-02-24 PROCEDURE — 80051 ELECTROLYTE PANEL: CPT

## 2025-02-24 PROCEDURE — 85730 THROMBOPLASTIN TIME PARTIAL: CPT

## 2025-02-24 PROCEDURE — 93005 ELECTROCARDIOGRAM TRACING: CPT | Performed by: STUDENT IN AN ORGANIZED HEALTH CARE EDUCATION/TRAINING PROGRAM

## 2025-02-24 PROCEDURE — 85027 COMPLETE CBC AUTOMATED: CPT

## 2025-02-24 PROCEDURE — 82947 ASSAY GLUCOSE BLOOD QUANT: CPT

## 2025-02-24 PROCEDURE — 36415 COLL VENOUS BLD VENIPUNCTURE: CPT

## 2025-02-24 PROCEDURE — 82565 ASSAY OF CREATININE: CPT

## 2025-02-24 PROCEDURE — 84520 ASSAY OF UREA NITROGEN: CPT

## 2025-03-03 ENCOUNTER — TELEPHONE (OUTPATIENT)
Dept: FAMILY MEDICINE CLINIC | Age: 80
End: 2025-03-03

## 2025-03-03 ENCOUNTER — HOSPITAL ENCOUNTER (OUTPATIENT)
Age: 80
Discharge: HOME | End: 2025-03-03
Payer: MEDICARE

## 2025-03-03 ENCOUNTER — OFFICE VISIT (OUTPATIENT)
Dept: FAMILY MEDICINE CLINIC | Age: 80
End: 2025-03-03

## 2025-03-03 VITALS
BODY MASS INDEX: 24.17 KG/M2 | WEIGHT: 128 LBS | HEART RATE: 76 BPM | DIASTOLIC BLOOD PRESSURE: 88 MMHG | HEIGHT: 61 IN | RESPIRATION RATE: 16 BRPM | SYSTOLIC BLOOD PRESSURE: 136 MMHG

## 2025-03-03 DIAGNOSIS — M54.50 ACUTE BILATERAL LOW BACK PAIN WITHOUT SCIATICA: ICD-10-CM

## 2025-03-03 DIAGNOSIS — E53.8 VITAMIN B 12 DEFICIENCY: Primary | ICD-10-CM

## 2025-03-03 DIAGNOSIS — K21.9 GASTROESOPHAGEAL REFLUX DISEASE, UNSPECIFIED WHETHER ESOPHAGITIS PRESENT: ICD-10-CM

## 2025-03-03 DIAGNOSIS — E55.9 VITAMIN D DEFICIENCY: ICD-10-CM

## 2025-03-03 DIAGNOSIS — E83.42 HYPOMAGNESEMIA: ICD-10-CM

## 2025-03-03 DIAGNOSIS — E53.8 VITAMIN B 12 DEFICIENCY: ICD-10-CM

## 2025-03-03 DIAGNOSIS — M17.0 PRIMARY OSTEOARTHRITIS OF BOTH KNEES: ICD-10-CM

## 2025-03-03 DIAGNOSIS — I10 ESSENTIAL HYPERTENSION: Primary | ICD-10-CM

## 2025-03-03 DIAGNOSIS — E78.5 HYPERLIPIDEMIA, UNSPECIFIED HYPERLIPIDEMIA TYPE: ICD-10-CM

## 2025-03-03 DIAGNOSIS — M81.0 AGE-RELATED OSTEOPOROSIS WITHOUT CURRENT PATHOLOGICAL FRACTURE: ICD-10-CM

## 2025-03-03 LAB
25(OH)D3 SERPL-MCNC: 54 NG/ML (ref 30–100)
BILIRUB UR QL STRIP.AUTO: NEGATIVE
CHARACTER UR: CLEAR
COLOR, UA: YELLOW
GLUCOSE UR QL STRIP.AUTO: NEGATIVE MG/DL
HGB UR QL STRIP.AUTO: NEGATIVE
KETONES UR QL STRIP.AUTO: NEGATIVE
MAGNESIUM SERPL-MCNC: 1.9 MG/DL (ref 1.6–2.6)
NITRITE UR QL STRIP: NEGATIVE
PH UR STRIP.AUTO: 6.5 [PH] (ref 5–9)
PROT UR STRIP.AUTO-MCNC: NEGATIVE MG/DL
SP GR UR REFRACT.AUTO: 1.01 (ref 1–1.03)
UROBILINOGEN, URINE: 0.2 EU/DL (ref 0–1)
VIT B12 SERPL-MCNC: 910 PG/ML (ref 232–1245)
WBC #/AREA URNS HPF: NEGATIVE /[HPF]

## 2025-03-03 PROCEDURE — G8420 CALC BMI NORM PARAMETERS: HCPCS | Performed by: FAMILY MEDICINE

## 2025-03-03 PROCEDURE — 82607 VITAMIN B-12: CPT

## 2025-03-03 PROCEDURE — 1036F TOBACCO NON-USER: CPT | Performed by: FAMILY MEDICINE

## 2025-03-03 PROCEDURE — 83735 ASSAY OF MAGNESIUM: CPT

## 2025-03-03 PROCEDURE — 1090F PRES/ABSN URINE INCON ASSESS: CPT | Performed by: FAMILY MEDICINE

## 2025-03-03 PROCEDURE — 1123F ACP DISCUSS/DSCN MKR DOCD: CPT | Performed by: FAMILY MEDICINE

## 2025-03-03 PROCEDURE — 99214 OFFICE O/P EST MOD 30 MIN: CPT | Performed by: FAMILY MEDICINE

## 2025-03-03 PROCEDURE — 96372 THER/PROPH/DIAG INJ SC/IM: CPT | Performed by: FAMILY MEDICINE

## 2025-03-03 PROCEDURE — 81003 URINALYSIS AUTO W/O SCOPE: CPT

## 2025-03-03 PROCEDURE — 36415 COLL VENOUS BLD VENIPUNCTURE: CPT

## 2025-03-03 PROCEDURE — G8399 PT W/DXA RESULTS DOCUMENT: HCPCS | Performed by: FAMILY MEDICINE

## 2025-03-03 PROCEDURE — G8427 DOCREV CUR MEDS BY ELIG CLIN: HCPCS | Performed by: FAMILY MEDICINE

## 2025-03-03 PROCEDURE — 82306 VITAMIN D 25 HYDROXY: CPT

## 2025-03-03 RX ORDER — CYANOCOBALAMIN 1000 UG/ML
1000 INJECTION, SOLUTION INTRAMUSCULAR; SUBCUTANEOUS ONCE
Status: COMPLETED | OUTPATIENT
Start: 2025-03-03 | End: 2025-03-03

## 2025-03-03 RX ADMIN — CYANOCOBALAMIN 1000 MCG: 1000 INJECTION, SOLUTION INTRAMUSCULAR; SUBCUTANEOUS at 15:41

## 2025-03-03 ASSESSMENT — ENCOUNTER SYMPTOMS
NAUSEA: 0
SHORTNESS OF BREATH: 0
BACK PAIN: 1
CHEST TIGHTNESS: 0
CONSTIPATION: 0
VOMITING: 0
EYES NEGATIVE: 1
DIARRHEA: 0
COUGH: 0
ABDOMINAL PAIN: 0

## 2025-03-03 NOTE — PROGRESS NOTES
After obtaining consent, and per orders of Dr. Camacho, injection of B 12 1 ml given in Right deltoid by Jayda Sanchez MA. Patient instructed to remain in clinic for 20 minutes afterwards, and to report any adverse reaction to me immediately.

## 2025-03-03 NOTE — TELEPHONE ENCOUNTER
Dr Chaudhry office called, they need us to fax over EKG, HNP, Progress notes and clearance for surgery on 3-10-25.    Fax [phone]

## 2025-03-03 NOTE — PROGRESS NOTES
infiltrates or effusions are seen.           **This report has been created using voice recognition software.  It may contain  minor errors which are inherent in voice recognition technology.**           Electronically signed by Dr. Zac Garg      Latest Reference Range & Units 03/03/25 15:20   Magnesium 1.6 - 2.6 mg/dL 1.9   Vitamin B-12 232 - 1245 pg/mL 910   Vit D, 25-Hydroxy 30 - 100 ng/ml 54   VITAMIN B12  Rpt       There are no contraindications to proceed with surgery.  Patient was cleared from a cardiac point of view by her cardiologist . Will notify referring surgeon. Recommend routine DVT/PE prophylaxis as per surgery.              Exam Ended: 02/24/25 12:05 EST Last Resulted: 02/24/25 12:10 EST

## 2025-04-21 DIAGNOSIS — K21.9 GASTROESOPHAGEAL REFLUX DISEASE, UNSPECIFIED WHETHER ESOPHAGITIS PRESENT: ICD-10-CM

## 2025-04-21 DIAGNOSIS — I10 ESSENTIAL HYPERTENSION: ICD-10-CM

## 2025-04-21 RX ORDER — PANTOPRAZOLE SODIUM 40 MG/1
40 TABLET, DELAYED RELEASE ORAL DAILY
Qty: 90 TABLET | Refills: 0 | Status: SHIPPED | OUTPATIENT
Start: 2025-04-21

## 2025-04-21 RX ORDER — LOSARTAN POTASSIUM 25 MG/1
25 TABLET ORAL DAILY
Qty: 90 TABLET | Refills: 0 | Status: SHIPPED | OUTPATIENT
Start: 2025-04-21

## 2025-04-21 NOTE — TELEPHONE ENCOUNTER
The pharmacy is  requesting a refill of the below medication which has been pended for you:     Requested Prescriptions     Pending Prescriptions Disp Refills    losartan (COZAAR) 25 MG tablet [Pharmacy Med Name: Losartan Potassium Oral Tablet 25 MG] 90 tablet 1     Sig: TAKE 1 TABLET EVERY DAY    pantoprazole (PROTONIX) 40 MG tablet [Pharmacy Med Name: Pantoprazole Sodium Oral Tablet Delayed Release 40 MG] 90 tablet 1     Sig: TAKE 1 TABLET EVERY DAY       Last Appointment Date: 3/3/2025  Next Appointment Date: Visit date not found    Allergies   Allergen Reactions    Augmentin [Amoxicillin-Pot Clavulanate] Diarrhea    Biaxin [Clarithromycin] Itching

## 2025-04-29 DIAGNOSIS — E78.5 HYPERLIPIDEMIA, UNSPECIFIED HYPERLIPIDEMIA TYPE: ICD-10-CM

## 2025-04-29 RX ORDER — ATORVASTATIN CALCIUM 40 MG/1
40 TABLET, FILM COATED ORAL DAILY
Qty: 90 TABLET | Refills: 1 | Status: SHIPPED | OUTPATIENT
Start: 2025-04-29

## 2025-04-29 NOTE — TELEPHONE ENCOUNTER
Date of last visit:  10/24/2024  Date of next visit:  Visit date not found    Requested Prescriptions     Pending Prescriptions Disp Refills    atorvastatin (LIPITOR) 40 MG tablet [Pharmacy Med Name: Atorvastatin Calcium Oral Tablet 40 MG] 90 tablet 1     Sig: TAKE 1 TABLET EVERY DAY

## 2025-05-14 ENCOUNTER — OFFICE VISIT (OUTPATIENT)
Dept: FAMILY MEDICINE CLINIC | Age: 80
End: 2025-05-14

## 2025-05-14 DIAGNOSIS — E53.8 VITAMIN B 12 DEFICIENCY: Primary | ICD-10-CM

## 2025-05-14 RX ORDER — CYANOCOBALAMIN 1000 UG/ML
1000 INJECTION, SOLUTION INTRAMUSCULAR; SUBCUTANEOUS ONCE
Status: COMPLETED | OUTPATIENT
Start: 2025-05-14 | End: 2025-05-14

## 2025-05-14 RX ADMIN — CYANOCOBALAMIN 1000 MCG: 1000 INJECTION, SOLUTION INTRAMUSCULAR; SUBCUTANEOUS at 12:42

## 2025-05-14 NOTE — PROGRESS NOTES
After obtaining consent, and per orders of  injection of b 12 1 ML given in Left deltoid by Jayda Sanchez MA. Patient instructed to remain in clinic for 20 minutes afterwards, and to report any adverse reaction to me immediately.

## 2025-06-17 ENCOUNTER — OFFICE VISIT (OUTPATIENT)
Dept: FAMILY MEDICINE CLINIC | Age: 80
End: 2025-06-17

## 2025-06-17 DIAGNOSIS — E53.8 VITAMIN B 12 DEFICIENCY: Primary | ICD-10-CM

## 2025-06-17 PROCEDURE — 96372 THER/PROPH/DIAG INJ SC/IM: CPT | Performed by: EMERGENCY MEDICINE

## 2025-06-17 RX ORDER — CYANOCOBALAMIN 1000 UG/ML
1000 INJECTION, SOLUTION INTRAMUSCULAR; SUBCUTANEOUS ONCE
Status: COMPLETED | OUTPATIENT
Start: 2025-06-17 | End: 2025-06-17

## 2025-06-17 RX ADMIN — CYANOCOBALAMIN 1000 MCG: 1000 INJECTION, SOLUTION INTRAMUSCULAR; SUBCUTANEOUS at 11:25

## 2025-06-17 NOTE — PROGRESS NOTES
After obtaining consent, and per orders of Dr. Rivas, injection of B 12 1 ml given in Right deltoid by Jayda Sanchez MA. Patient instructed to remain in clinic for 20 minutes afterwards, and to report any adverse reaction to me immediately.

## 2025-07-15 ENCOUNTER — OFFICE VISIT (OUTPATIENT)
Dept: FAMILY MEDICINE CLINIC | Age: 80
End: 2025-07-15

## 2025-07-15 DIAGNOSIS — E53.8 VITAMIN B 12 DEFICIENCY: Primary | ICD-10-CM

## 2025-07-15 PROCEDURE — 96372 THER/PROPH/DIAG INJ SC/IM: CPT | Performed by: EMERGENCY MEDICINE

## 2025-07-15 RX ORDER — CYANOCOBALAMIN 1000 UG/ML
1000 INJECTION, SOLUTION INTRAMUSCULAR; SUBCUTANEOUS ONCE
Status: COMPLETED | OUTPATIENT
Start: 2025-07-15 | End: 2025-07-15

## 2025-07-15 RX ADMIN — CYANOCOBALAMIN 1000 MCG: 1000 INJECTION, SOLUTION INTRAMUSCULAR; SUBCUTANEOUS at 11:38

## 2025-07-15 NOTE — PROGRESS NOTES
After obtaining consent, and per orders of Dr. Rivas, injection of B 12 1 ml given in Left deltoid by Jayda Sanchez MA. Patient instructed to remain in clinic for 20 minutes afterwards, and to report any adverse reaction to me immediately.

## 2025-07-17 DIAGNOSIS — I10 ESSENTIAL HYPERTENSION: ICD-10-CM

## 2025-07-17 DIAGNOSIS — K21.9 GASTROESOPHAGEAL REFLUX DISEASE, UNSPECIFIED WHETHER ESOPHAGITIS PRESENT: ICD-10-CM

## 2025-07-17 NOTE — TELEPHONE ENCOUNTER
The pharmacy is  requesting a refill of the below medication which has been pended for you:     Requested Prescriptions     Pending Prescriptions Disp Refills    losartan (COZAAR) 25 MG tablet [Pharmacy Med Name: LOSARTAN POTASSIUM 25 MG Oral Tablet] 90 tablet 1     Sig: TAKE 1 TABLET EVERY DAY    pantoprazole (PROTONIX) 40 MG tablet [Pharmacy Med Name: PANTOPRAZOLE SODIUM 40 MG Oral Tablet Delayed Release] 90 tablet 1     Sig: TAKE 1 TABLET EVERY DAY       Last Appointment Date: 5/14/2025  Next Appointment Date: Visit date not found    Allergies   Allergen Reactions    Augmentin [Amoxicillin-Pot Clavulanate] Diarrhea    Biaxin [Clarithromycin] Itching

## 2025-07-18 RX ORDER — PANTOPRAZOLE SODIUM 40 MG/1
40 TABLET, DELAYED RELEASE ORAL DAILY
Qty: 90 TABLET | Refills: 0 | Status: SHIPPED | OUTPATIENT
Start: 2025-07-18

## 2025-07-18 RX ORDER — LOSARTAN POTASSIUM 25 MG/1
25 TABLET ORAL DAILY
Qty: 90 TABLET | Refills: 0 | Status: SHIPPED | OUTPATIENT
Start: 2025-07-18

## 2025-08-20 ENCOUNTER — OFFICE VISIT (OUTPATIENT)
Dept: FAMILY MEDICINE CLINIC | Age: 80
End: 2025-08-20

## 2025-08-20 DIAGNOSIS — E53.8 VITAMIN B 12 DEFICIENCY: Primary | ICD-10-CM

## 2025-08-20 RX ORDER — CYANOCOBALAMIN 1000 UG/ML
1000 INJECTION, SOLUTION INTRAMUSCULAR; SUBCUTANEOUS ONCE
Status: COMPLETED | OUTPATIENT
Start: 2025-08-20 | End: 2025-08-20

## 2025-08-20 RX ADMIN — CYANOCOBALAMIN 1000 MCG: 1000 INJECTION, SOLUTION INTRAMUSCULAR; SUBCUTANEOUS at 14:51
